# Patient Record
Sex: MALE | Race: WHITE | NOT HISPANIC OR LATINO | Employment: OTHER | URBAN - METROPOLITAN AREA
[De-identification: names, ages, dates, MRNs, and addresses within clinical notes are randomized per-mention and may not be internally consistent; named-entity substitution may affect disease eponyms.]

---

## 2017-01-11 DIAGNOSIS — R82.994 HYPERCALCIURIA: Primary | ICD-10-CM

## 2017-01-11 RX ORDER — HYDROCHLOROTHIAZIDE 50 MG/1
50 TABLET ORAL DAILY
Qty: 90 TABLET | Refills: 3 | Status: SHIPPED | OUTPATIENT
Start: 2017-01-11 | End: 2018-02-05

## 2017-01-11 NOTE — TELEPHONE ENCOUNTER
Last Office Visit with Nephrologist:  5/31/16.  Medication refilled per Nephrology Clinic protocol.     Linda Mai RN

## 2017-02-06 ENCOUNTER — TRANSFERRED RECORDS (OUTPATIENT)
Dept: HEALTH INFORMATION MANAGEMENT | Facility: CLINIC | Age: 57
End: 2017-02-06

## 2017-02-22 ENCOUNTER — DOCUMENTATION ONLY (OUTPATIENT)
Dept: UROLOGY | Facility: CLINIC | Age: 57
End: 2017-02-22

## 2017-03-28 ENCOUNTER — PRE VISIT (OUTPATIENT)
Dept: NEPHROLOGY | Facility: CLINIC | Age: 57
End: 2017-03-28

## 2017-03-28 NOTE — TELEPHONE ENCOUNTER
Patient is coming in to see  to go over [facility], no need for a call very thing is in epic ready for appointment.

## 2017-04-11 ENCOUNTER — MYC MEDICAL ADVICE (OUTPATIENT)
Dept: NEPHROLOGY | Facility: CLINIC | Age: 57
End: 2017-04-11

## 2017-04-25 ENCOUNTER — OFFICE VISIT (OUTPATIENT)
Dept: NEPHROLOGY | Facility: CLINIC | Age: 57
End: 2017-04-25

## 2017-04-25 VITALS
HEIGHT: 70 IN | WEIGHT: 175 LBS | BODY MASS INDEX: 25.05 KG/M2 | SYSTOLIC BLOOD PRESSURE: 118 MMHG | DIASTOLIC BLOOD PRESSURE: 77 MMHG | HEART RATE: 58 BPM

## 2017-04-25 DIAGNOSIS — R82.994 HYPERCALCIURIA: ICD-10-CM

## 2017-04-25 DIAGNOSIS — N20.0 RECURRENT KIDNEY STONES: Primary | ICD-10-CM

## 2017-04-25 DIAGNOSIS — Z51.81 ENCOUNTER FOR THERAPEUTIC DRUG MONITORING: ICD-10-CM

## 2017-04-25 ASSESSMENT — PAIN SCALES - GENERAL: PAINLEVEL: NO PAIN (0)

## 2017-04-25 NOTE — LETTER
4/25/2017       RE: Serjio Shields  [address] [facility] [address]     Dear Colleague,    Thank you for referring your patient, Serjio Shields, to the [facility] UROLOGY AND INST FOR PROSTATE AND UROLOGIC CANCERS at [facility]. Please see a copy of my visit note below.    [facility] Nephrology Comprehensive Stone Clinic  04/25/2017     Serjio Shields MRN:[id number] YOB: 1960  Primary care provider: Sony Kidd  Requesting physician:   Larry Elias MD   PHYSICIANS  [address] [facility] [address]  [place] [address]    ASSESSMENT AND RECOMMENDATIONS:   Serjio Shields is a 56 year old male presenting for nephrolithiasis.  1. Recurrent nephrolithiasis: calcium oxalate, risk is hypercalciuria which is in part dietary  - ultrasound shows no stone recurrence  2. Idiopathic hypercalciuria: worse with worsening urine sodium.  Discussed further decrease in dietary sodium.      RTC 52 weeks with litholink and low dose CT scan for kidney stones only    Anita Hoff MD   [facility]  Department of Medicine  Division of Renal Disease and Hypertension  [phone]       REASON FOR VISIT: nephrolithiasis with hypercalciuria    HISTORY OF PRESENT ILLNESS:  Serjio Shields is a 56 year old man with recurrent kidney stones who has followed in stone clinic for several years with litholinks dating back to 2008.  Two years ago he had stone recurrence diagnosed incidentally on CT that diagnosed PE (7/3/14).  He had pre-emptive stone removal (he is recreational ) on 6/24/15 with URS and stone basketing on 6/24/15 for stones seen on 5/26/15 CT.      Since last visit he has continued on high calcium diet and low sodium diet but with occasional pizza.  He remains on HCTZ without any symptoms.  He drinks calcium fortified OJ in am, almond milk at night with dinner.  He has occasional cheese and crackers.    He is  here with 24 hour urine done on 2/6/17 that shows volume of 1.56 L, calcium 539 with sodium of 249 and protein catabolic rate of 1.   The 24 hour urine oxalate is 36 and super-saturation of calcium oxalate 7.15.   24 hour urine citrate is 1090 with pH of 6.7.  24 hour urine uric acid is 586 mg/day with super-saturation uric acid of 0.09.    In terms of calcium phosphate, risk is elevated with ss CaP of 4 in the setting of high urine calcium.  24 hour urine potassium is 76 mmol/day  24 hour urine magnesium is 152 mg/day     Getting laid off from his job as a , he is considering finding a job as a .    PAST MEDICAL HISTORY:  Past Medical History:   Diagnosis Date     Pulmonary emboli (H)      Pure hypercholesterolemia      Renal disease     kidney stones       PSH:  Past Surgical History:   Procedure Laterality Date     CYSTOSCOPY, URETEROSCOPY, COMBINED Left 6/24/2015    Procedure: COMBINED CYSTOSCOPY, URETEROSCOPY;  Surgeon: Larry Elias MD;  Location: [facility] OR     SURGICAL HISTORY OF -       meatus dilation in childhood        MEDICATIONS:  Current Outpatient Prescriptions   Medication Sig Dispense Refill     hydrochlorothiazide (HYDRODIURIL) 50 MG tablet Take 1 tablet (50 mg) by mouth daily 90 tablet 3     simvastatin (ZOCOR) 40 MG tablet Take 1 tablet (40 mg) by mouth At Bedtime 90 tablet 3     Cyanocobalamin (VITAMIN B 12 PO) Take 1 tablet by mouth daily        Cholecalciferol (VITAMIN D3 PO) Take 1 tablet by mouth daily.          ALLERGIES:    Allergies   Allergen Reactions     Penicillins Unknown     Unsure of exact reaction, had it as an infant       REVIEW OF SYSTEMS:  A 10 point review of systems was negative except as noted above.    SOCIAL HISTORY:   Social History     Social History     Marital status: Single     Spouse name: N/A     Number of children: N/A     Years of education: N/A     Occupational History     Not on file.     Social History Main Topics     Smoking status: Never  "Smoker     Smokeless tobacco: Never Used     Alcohol use Yes      Comment: rare use     Drug use: No     Sexual activity: Yes     Partners: Female     Other Topics Concern     Parent/Sibling W/ Cabg, Mi Or Angioplasty Before 65f 55m? No     Social History Narrative   , recreational     FAMILY MEDICAL HISTORY:   Family History   Problem Relation Age of Onset     C.A.D. Mother      stents     Hypertension Mother      Cancer - colorectal Mother      CANCER Mother      skin cancer     DIABETES No family hx of      CEREBROVASCULAR DISEASE No family hx of      Breast Cancer No family hx of      Cancer - colorectal No family hx of      Hypertension Brother      CANCER Father      bladder, metastatic to bone, passed 2002     Skin Cancer Father      Prostate Cancer Paternal Grandfather        PHYSICAL EXAM:   /77  Pulse 58  Ht 1.778 m (5' 10\")  Wt 79.4 kg (175 lb)  BMI 25.11 kg/m2     GENERAL APPEARANCE: alert and no distress  Head: NC/AT  EYES: no scleral icterus, gaze conjugate  HENT: mouth without ulcers or lesions  NECK: supple, no goiter  RESP: normal work of breathing, no cyanosis or clubbing   CV: no edema, warm and well perfused  : no Lu  SKIN: no rash, warm, dry  NEURO: face symmetric, mentation intact and speech normal  Psych: well groomed, affect full, pleasant, normal mental status  Musculoskeletal: grossly normal ROM of arms and legs without obvious joint abnormalities    LABS:   Electrolytes/Renal -   Recent Labs   Lab Test  12/30/15   0935  10/30/15   1624  06/09/15   0806   NA  139  140  140   POTASSIUM  4.4  4.0  4.3   CHLORIDE  103  106  106   CO2  31  31  27   BUN  13  14  12   CR  0.92  0.96  0.90   GLC  86  92  77   NYASIA  9.0  8.6  8.9   PHOS   --   2.1*   --        CBC -   Recent Labs   Lab Test  06/09/15   0806  11/07/14   0934  07/03/14   1734  07/03/14   1205   WBC   --   5.6  8.2  7.1   HGB  16.0  15.8  14.3  15.1   PLT   --   178  155  167       LFTs -   Recent Labs   Lab " Test  10/30/15   1624  07/03/14   1205  01/03/12   0803  05/16/11   1646   ALKPHOS   --   88   --    --    BILITOTAL   --   0.6   --    --    ALT   --   22  37  22   AST   --   17   --    --    PROTTOTAL   --   7.3   --    --    ALBUMIN  3.4  3.9   --    --        Coags -   Recent Labs   Lab Test  10/07/14   0720 09/09/14 08/19/14   INR  2.10*  2.5*  2.8*       Iron Panel - No lab results found.    Endocrine - No lab results found.    IMAGING:  Ultrasound report from today reviewed by me - no kidney stone    Anita Hoff MD

## 2017-04-25 NOTE — LETTER
April 25, 2017    RE:  Serjio Shields                              [address] [place] [address]            To whom it may concern:    Serjio Shields is under my professional care for    Recurrent kidney stones  Hypercalciuria.     He was recently seen in clinic for routine follow up. He had ultrasound done today. He currently is clear of nephrolithiasis.   There is no kidney stone.     Sincerely,    Anita Hoff MD

## 2017-04-25 NOTE — PROGRESS NOTES
[facility] Nephrology Comprehensive Stone Clinic  04/25/2017     Serjio Shields MRN:[id number] YOB: 1960  Primary care provider: Sony Kidd  Requesting physician:   Larry Elias MD   PHYSICIANS  [address] [facility] [address]  [place] [address]    ASSESSMENT AND RECOMMENDATIONS:   Serjio Shields is a 56 year old male presenting for nephrolithiasis.  1. Recurrent nephrolithiasis: calcium oxalate, risk is hypercalciuria which is in part dietary  - ultrasound shows no stone recurrence  2. Idiopathic hypercalciuria: worse with worsening urine sodium.  Discussed further decrease in dietary sodium.      RTC 52 weeks with litholink and low dose CT scan for kidney stones only    Anita Hoff MD   [facility]  Department of Medicine  Division of Renal Disease and Hypertension  [phone]       REASON FOR VISIT: nephrolithiasis with hypercalciuria    HISTORY OF PRESENT ILLNESS:  Serjio Shields is a 56 year old man with recurrent kidney stones who has followed in stone clinic for several years with litholinks dating back to 2008.  Two years ago he had stone recurrence diagnosed incidentally on CT that diagnosed PE (7/3/14).  He had pre-emptive stone removal (he is recreational ) on 6/24/15 with URS and stone basketing on 6/24/15 for stones seen on 5/26/15 CT.      Since last visit he has continued on high calcium diet and low sodium diet but with occasional pizza.  He remains on HCTZ without any symptoms.  He drinks calcium fortified OJ in am, almond milk at night with dinner.  He has occasional cheese and crackers.    He is here with 24 hour urine done on 2/6/17 that shows volume of 1.56 L, calcium 539 with sodium of 249 and protein catabolic rate of 1.   The 24 hour urine oxalate is 36 and super-saturation of calcium oxalate 7.15.   24 hour urine citrate is 1090 with pH of 6.7.  24 hour urine uric acid is 586 mg/day with super-saturation uric  acid of 0.09.    In terms of calcium phosphate, risk is elevated with ss CaP of 4 in the setting of high urine calcium.  24 hour urine potassium is 76 mmol/day  24 hour urine magnesium is 152 mg/day     Getting laid off from his job as a , he is considering finding a job as a .    PAST MEDICAL HISTORY:  Past Medical History:   Diagnosis Date     Pulmonary emboli (H)      Pure hypercholesterolemia      Renal disease     kidney stones       PSH:  Past Surgical History:   Procedure Laterality Date     CYSTOSCOPY, URETEROSCOPY, COMBINED Left 6/24/2015    Procedure: COMBINED CYSTOSCOPY, URETEROSCOPY;  Surgeon: Larry Elias MD;  [facility]     SURGICAL HISTORY OF -       meatus dilation in childhood        MEDICATIONS:  Current Outpatient Prescriptions   Medication Sig Dispense Refill     hydrochlorothiazide (HYDRODIURIL) 50 MG tablet Take 1 tablet (50 mg) by mouth daily 90 tablet 3     simvastatin (ZOCOR) 40 MG tablet Take 1 tablet (40 mg) by mouth At Bedtime 90 tablet 3     Cyanocobalamin (VITAMIN B 12 PO) Take 1 tablet by mouth daily        Cholecalciferol (VITAMIN D3 PO) Take 1 tablet by mouth daily.          ALLERGIES:    Allergies   Allergen Reactions     Penicillins Unknown     Unsure of exact reaction, had it as an infant       REVIEW OF SYSTEMS:  A 10 point review of systems was negative except as noted above.    SOCIAL HISTORY:   Social History     Social History     Marital status: Single     Spouse name: N/A     Number of children: N/A     Years of education: N/A     Occupational History     Not on file.     Social History Main Topics     Smoking status: Never Smoker     Smokeless tobacco: Never Used     Alcohol use Yes      Comment: rare use     Drug use: No     Sexual activity: Yes     Partners: Female     Other Topics Concern     Parent/Sibling W/ Cabg, Mi Or Angioplasty Before 65f 55m? No     Social History Narrative   , recreational     FAMILY MEDICAL HISTORY:  "  Family History   Problem Relation Age of Onset     C.A.D. Mother      stents     Hypertension Mother      Cancer - colorectal Mother      CANCER Mother      skin cancer     DIABETES No family hx of      CEREBROVASCULAR DISEASE No family hx of      Breast Cancer No family hx of      Cancer - colorectal No family hx of      Hypertension Brother      CANCER Father      bladder, metastatic to bone, passed 2002     Skin Cancer Father      Prostate Cancer Paternal Grandfather        PHYSICAL EXAM:   /77  Pulse 58  Ht 1.778 m (5' 10\")  Wt 79.4 kg (175 lb)  BMI 25.11 kg/m2     GENERAL APPEARANCE: alert and no distress  Head: NC/AT  EYES: no scleral icterus, gaze conjugate  HENT: mouth without ulcers or lesions  NECK: supple, no goiter  RESP: normal work of breathing, no cyanosis or clubbing   CV: no edema, warm and well perfused  : no Lu  SKIN: no rash, warm, dry  NEURO: face symmetric, mentation intact and speech normal  Psych: well groomed, affect full, pleasant, normal mental status  Musculoskeletal: grossly normal ROM of arms and legs without obvious joint abnormalities    LABS:   Electrolytes/Renal -   Recent Labs   Lab Test  12/30/15   0935  10/30/15   1624  06/09/15   0806   NA  139  140  140   POTASSIUM  4.4  4.0  4.3   CHLORIDE  103  106  106   CO2  31  31  27   BUN  13  14  12   CR  0.92  0.96  0.90   GLC  86  92  77   NYASIA  9.0  8.6  8.9   PHOS   --   2.1*   --        CBC -   Recent Labs   Lab Test  06/09/15   0806  11/07/14   0934  07/03/14   1734  07/03/14   1205   WBC   --   5.6  8.2  7.1   HGB  16.0  15.8  14.3  15.1   PLT   --   178  155  167       LFTs -   Recent Labs   Lab Test  10/30/15   1624  07/03/14   1205  01/03/12   0803  05/16/11   1646   ALKPHOS   --   88   --    --    BILITOTAL   --   0.6   --    --    ALT   --   22  37  22   AST   --   17   --    --    PROTTOTAL   --   7.3   --    --    ALBUMIN  3.4  3.9   --    --        Coags -   Recent Labs   Lab Test  10/07/14   0720 09/09/14 " 08/19/14   INR  2.10*  2.5*  2.8*       Iron Panel - No lab results found.    Endocrine - No lab results found.    IMAGING:  Ultrasound report from today reviewed by me - no kidney stone    Anita Hoff MD

## 2017-04-25 NOTE — MR AVS SNAPSHOT
After Visit Summary   4/25/2017    Serjio Shields    MRN: [id number]           Patient Information     Date Of Birth          1960        Visit Information        Provider Department      4/25/2017 2:20 PM Anita Hoff MD [facility] Urology and [facility] for Prostate and Urologic Cancers        Today's Diagnoses     Recurrent kidney stones    -  1    Hypercalciuria          Care Instructions    Dear Serjio Shields      Your were seen in the [facility] Stone [facility].  Basic advice to avoid kidney stones  - Drink 100 ounces of fluid daily  - keep urine volume greater than 72 ounces per day  - low sodium diet (less than 2400 mg sodium per day)  - plenty of dietary calcium.  Please have one high calcium food three times a day with meals - can be either 8 oz milk, 6 oz yogurt or 2 oz low sodium cheese or 8 oz calcium fortified OJ/dairy alternative)   ---- Total should be at least 1000 mg calcium a day from food  - Protein (meat) in moderation  - add lemon or lime to foods and beverages.  Consider 2-4 oz lemon or lime juice diluted in water.  I advise against lemonade since it is high in sugar and low in actual lemon juice.     [email or web address]    Today we discussed continue low salt and high calcium diet.      Please get the following tests done:  24 hour urine in one year  CT scan in one year    Please set up appointment with:  Anita Hoff MD in one year    It was a pleasure meeting with you today. Thank you for allowing me and my team the privilege of caring for you today. YOU are the reason we are here, and I truly hope we provided you with the excellent service you deserve. Please let us know if there is anything else we can do for you so that we can be sure you are leaving completely satisfied with your care experience.    Take care!  Anita Hoff MD  Department of Medicine  Division of Renal Diseases and Hypertension  [facility]  [place]    Email: [email or web address]  You may reach a nurse by calling the urology clinic at [phone]              Follow-ups after your visit        Your next 10 appointments already scheduled     Apr 25, 2017  2:20 PM CDT   (Arrive by 2:05 PM)   Return Visit with Anita Hoff MD   [facility] Urology and [facility] for Prostate and Urologic Cancers ([facility])    [address] [facility] [address] [phone]            May 09, 2017  7:20 AM CDT   Office Visit [facility]idd MD   [facility]    [address] [address] [phone]           Bring a current list of meds and any records pertaining to this visit.  For Physicals, please bring immunization records and any forms needing to be filled out.  Please arrive 10 minutes early to complete paperwork.              Who to contact     Please call your clinic at [phone] to:    Ask questions about your health    Make or cancel appointments    Discuss your medicines    Learn about your test results    Speak to your doctor   If you have compliments or concerns about an experience at your clinic, or if you wish to file a complaint, please contact [facility] Physicians Patient Relations at [phone] or email us at [email or web address]         Additional Information About Your Visit        MyChart Information     [employer] gives you secure access to your electronic health record. If you see a primary care provider, you can also send messages to your care team and make appointments. If you have questions, please call your primary care clinic.  If you do not have a primary care provider, please call [phone] and they will assist you.      W[employer] is an electronic gateway that provides easy, online access to your medical records. With [employer], you can request a clinic appointment, read your test  "results, renew a prescription or communicate with your care team.     To access your existing account, please contact your [facility] or call [phone] for assistance.        Care EveryWhere ID     This is your Care EveryWhere ID. This could be used by other organizations to access your [place] medical records  [id number]        Your Vitals Were     Pulse Height BMI (Body Mass Index)             58 1.778 m (5' 10\") 25.11 kg/m2          Blood Pressure from Last 3 Encounters:   04/25/17 118/77   06/13/16 116/70   05/31/16 136/86    Weight from Last 3 Encounters:   04/25/17 79.4 kg (175 lb)   06/13/16 81.2 kg (179 lb)   05/31/16 82.1 kg (181 lb 1.6 oz)              Today, you had the following     No orders found for display       Primary Care Provider Office Phone # Fax #    Sony Kidd [phone]       [address] [address]        Thank you!     Thank you for choosing [facility] UROLOGY AND [facility] FOR PROSTATE AND UROLOGIC CANCERS  for your care. Our goal is always to provide you with excellent care. Hearing back from our patients is one way we can continue to improve our services. Please take a few minutes to complete the written survey that you may receive in the mail after your visit with us. Thank you!             Your Updated Medication List - Protect others around you: Learn how to safely use, store and throw away your medicines at www.disposemymeds.org.          This list is accurate as of: 4/25/17  1:57 PM.  Always use your most recent med list.                   Brand Name Dispense Instructions for use    hydrochlorothiazide 50 MG tablet    HYDRODIURIL    90 tablet    Take 1 tablet (50 mg) by mouth daily       simvastatin 40 MG tablet    ZOCOR    90 tablet    Take 1 tablet (40 mg) by mouth At Bedtime       VITAMIN B 12 PO      Take 1 tablet by mouth daily       VITAMIN D3 PO      Take 1 tablet by " mouth daily.

## 2017-05-09 ENCOUNTER — OFFICE VISIT (OUTPATIENT)
Dept: FAMILY MEDICINE | Facility: CLINIC | Age: 57
End: 2017-05-09
Payer: COMMERCIAL

## 2017-05-09 VITALS
DIASTOLIC BLOOD PRESSURE: 78 MMHG | SYSTOLIC BLOOD PRESSURE: 124 MMHG | HEART RATE: 56 BPM | WEIGHT: 175 LBS | HEIGHT: 70 IN | TEMPERATURE: 98.4 F | BODY MASS INDEX: 25.05 KG/M2

## 2017-05-09 DIAGNOSIS — E78.5 HYPERLIPIDEMIA LDL GOAL <130: Primary | ICD-10-CM

## 2017-05-09 DIAGNOSIS — Z80.42 FAMILY HISTORY OF PROSTATE CANCER: ICD-10-CM

## 2017-05-09 DIAGNOSIS — R82.994 HYPERCALCIURIA: ICD-10-CM

## 2017-05-09 DIAGNOSIS — Z12.5 SPECIAL SCREENING FOR MALIGNANT NEOPLASM OF PROSTATE: ICD-10-CM

## 2017-05-09 DIAGNOSIS — Z86.711 HISTORY OF PULMONARY EMBOLISM: ICD-10-CM

## 2017-05-09 LAB
ANION GAP SERPL CALCULATED.3IONS-SCNC: 9 MMOL/L (ref 3–14)
BUN SERPL-MCNC: 9 MG/DL (ref 7–30)
CALCIUM SERPL-MCNC: 8.8 MG/DL (ref 8.5–10.1)
CHLORIDE SERPL-SCNC: 107 MMOL/L (ref 94–109)
CHOLEST SERPL-MCNC: 170 MG/DL
CO2 SERPL-SCNC: 26 MMOL/L (ref 20–32)
CREAT SERPL-MCNC: 0.93 MG/DL (ref 0.66–1.25)
GFR SERPL CREATININE-BSD FRML MDRD: 83 ML/MIN/1.7M2
GLUCOSE SERPL-MCNC: 90 MG/DL (ref 70–99)
HDLC SERPL-MCNC: 47 MG/DL
LDLC SERPL CALC-MCNC: 92 MG/DL
NONHDLC SERPL-MCNC: 123 MG/DL
POTASSIUM SERPL-SCNC: 3.9 MMOL/L (ref 3.4–5.3)
PSA SERPL-ACNC: 1.05 UG/L (ref 0–4)
SODIUM SERPL-SCNC: 142 MMOL/L (ref 133–144)
TRIGL SERPL-MCNC: 154 MG/DL

## 2017-05-09 PROCEDURE — 80048 BASIC METABOLIC PNL TOTAL CA: CPT | Performed by: FAMILY MEDICINE

## 2017-05-09 PROCEDURE — 36415 COLL VENOUS BLD VENIPUNCTURE: CPT | Performed by: FAMILY MEDICINE

## 2017-05-09 PROCEDURE — G0103 PSA SCREENING: HCPCS | Performed by: FAMILY MEDICINE

## 2017-05-09 PROCEDURE — 99213 OFFICE O/P EST LOW 20 MIN: CPT | Performed by: FAMILY MEDICINE

## 2017-05-09 PROCEDURE — 80061 LIPID PANEL: CPT | Performed by: FAMILY MEDICINE

## 2017-05-09 RX ORDER — HYDROCHLOROTHIAZIDE 50 MG/1
50 TABLET ORAL DAILY
Qty: 90 TABLET | Refills: 3 | Status: CANCELLED | OUTPATIENT
Start: 2017-05-09

## 2017-05-09 RX ORDER — SIMVASTATIN 40 MG
40 TABLET ORAL AT BEDTIME
Qty: 90 TABLET | Refills: 3 | Status: CANCELLED | OUTPATIENT
Start: 2017-05-09

## 2017-05-09 NOTE — MR AVS SNAPSHOT
After Visit Summary   5/9/2017    Serjio Shields    MRN: [id number]           Patient Information     Date Of Birth          1960        Visit Information        Provider Department      5/9/2017 7:20 AM Sony Kidd MD [facility]        Today's Diagnoses     Hyperlipidemia LDL goal <130    -  1    Family history of prostate cancer        Special screening for malignant neoplasm of prostate        Hypercalciuria        History of pulmonary embolism           Follow-ups after your visit        Who to contact     If you have questions or need follow up information about today's clinic visit or your schedule please contact [facility] directly at [phone].  Normal or non-critical lab and imaging results will be communicated to you by MyChart, letter or phone within 4 business days after the clinic has received the results. If you do not hear from us within 7 days, please contact the clinic through PDV[employer] or phone. If you have a critical or abnormal lab result, we will notify you by phone as soon as possible.  Submit refill requests through [employer] or call your pharmacy and they will forward the refill request to us. Please allow 3 business days for your refill to be completed.          Additional Information About Your Visit        MyChart Information     [employer] gives you secure access to your electronic health record. If you see a primary care provider, you can also send messages to your care team and make appointments. If you have questions, please call your primary care clinic.  If you do not have a primary care provider, please call [phone] and they will assist you.        Care EveryWhere ID     This is your Care EveryWhere ID. This could be used by other organizations to access your [place] medical records  [id number]        Your Vitals Were     Pulse Temperature Height BMI (Body Mass Index)          56 98.4  F (36.9  C) (Oral) 5'  "10\" (1.778 m) 25.11 kg/m2         Blood Pressure from Last 3 Encounters:   05/09/17 124/78   04/25/17 118/77   06/13/16 116/70    Weight from Last 3 Encounters:   05/09/17 175 lb (79.4 kg)   04/25/17 175 lb (79.4 kg)   06/13/16 179 lb (81.2 kg)              We Performed the Following     Basic metabolic panel     Lipid Profile with reflex to direct LDL     PSA, screen        Primary Care Provider Office Phone # Fax #    Sony Kidd [phone]       [facility] [address] [address]        Thank you!     Thank you for choosing [facility]  for your care. Our goal is always to provide you with excellent care. Hearing back from our patients is one way we can continue to improve our services. Please take a few minutes to complete the written survey that you may receive in the mail after your visit with us. Thank you!             Your Updated Medication List - Protect others around you: Learn how to safely use, store and throw away your medicines at www.disposemymeds.org.          This list is accurate as of: 5/9/17  7:51 AM.  Always use your most recent med list.                   Brand Name Dispense Instructions for use    hydrochlorothiazide 50 MG tablet    HYDRODIURIL    90 tablet    Take 1 tablet (50 mg) by mouth daily       simvastatin 40 MG tablet    ZOCOR    90 tablet    Take 1 tablet (40 mg) by mouth At Bedtime       VITAMIN B 12 PO      Take 1 tablet by mouth daily       VITAMIN D3 PO      Take 1 tablet by mouth daily.         "

## 2017-05-09 NOTE — PROGRESS NOTES
SUBJECTIVE:                                                    Serjio Shields is a 56 year old male who presents to clinic today for the following health issues:    Prostate cancer. Screening recommendations discussed. Previous PSA's WNL, however he does have a family history of prostate cancer (distaff grandfather in his 60's). Denies nocturia, urinary hesitancy, or urinary retention.     Hx of PE in 3/14. He completed treatment at that time and has been doing well. Denies SOB or leg swelling. He's a  and need letter to confirm ability to fly.     Past/recent records reviewed and discussed for -- medications.      Hyperlipidemia Follow-Up      Rate your low fat/cholesterol diet?: good    Taking statin?  Yes, no muscle aches from statin    Other lipid medications/supplements?:  none     Hypertension Follow-up      Outpatient blood pressures are not being checked.    Low Salt Diet: no added salt       Amount of exercise or physical activity: 4-5 days/week for an average of 30-45 minutes    Problems taking medications regularly: No    Medication side effects: none    Diet: low salt and low fat/cholesterol          Problem list and histories reviewed & adjusted, as indicated.  Additional history: as documented    Labs reviewed in EPIC    Reviewed and updated as needed this visit by clinical staff  Tobacco  Allergies  Med Hx  Surg Hx  Fam Hx  Soc Hx      Reviewed and updated as needed this visit by Provider         ROS:  Constitutional, HEENT, cardiovascular, pulmonary, GI, , musculoskeletal, neuro, skin, endocrine and psych systems are negative, except as otherwise noted.    This document serves as a record of the services and decisions personally performed and made by Enoc Kidd MD. It was created on their behalf by Haris Encinas, a trained medical scribe. The creation of this document is based the provider's statements to the medical scribe.  Haris Encinas May 9, 2017 7:34 AM         OBJECTIVE:           "                                          /78 (Cuff Size: Adult Regular)  Pulse 56  Temp 98.4  F (36.9  C) (Oral)  Ht 1.778 m (5' 10\")  Wt 79.4 kg (175 lb)  BMI 25.11 kg/m2  Body mass index is 25.11 kg/(m^2).  GENERAL: healthy, alert and no distress  HENT: ear canals and TM's normal, nose and mouth without ulcers or lesions  NECK: no adenopathy, no asymmetry, masses, or scars and thyroid normal to palpation  RESP: lungs clear to auscultation - no rales, rhonchi or wheezes  CV: regular rate and rhythm, normal S1 S2, no S3 or S4, no murmur, click or rub, no peripheral edema and peripheral pulses strong  ABDOMEN: soft, nontender, no hepatosplenomegaly, no masses and bowel sounds normal  MS: no gross musculoskeletal defects noted, no edema, no calf tenderness   SKIN: no suspicious lesions or rashes  PSYCH: mentation appears normal, affect normal/bright    Diagnostic Test Results:  none      ASSESSMENT/PLAN:                                                    (E78.5) Hyperlipidemia LDL goal <130  (primary encounter diagnosis)  Comment: stable from previous workups  Plan: Lipid Profile with reflex to direct LDL        -follow up, pending results    (Z80.42) Family history of prostate cancer  Comment: patient is asymptomatic and previous PSA's WNL  Plan: PSA, screen        -Follow up, pending results    (Z12.5) Special screening for malignant neoplasm of prostate  Comment: see comments above  Plan: PSA, screen        -Follow up, pending results    (E83.50) Hypercalciuria  Comment: stable  Plan: Basic metabolic panel        -follow up, pending results    (Z86.711) History of pulmonary embolism  Comment: Patient doing well. Needs documentation confirming benign status.  Plan: Letter written        The information in this document, created by the medical scribe for me, accurately reflects the services I personally performed and the decisions made by me. I have reviewed and approved this document for accuracy prior " to leaving the patient care area.      Sony Kidd MD, MD  [facility]

## 2017-05-09 NOTE — NURSING NOTE
"Chief Complaint   Patient presents with     Hypertension     Lipids     pt is fasting       Initial /78 (Cuff Size: Adult Regular)  Pulse 56  Temp 98.4  F (36.9  C) (Oral)  Ht 5' 10\" (1.778 m)  Wt 175 lb (79.4 kg)  BMI 25.11 kg/m2 Estimated body mass index is 25.11 kg/(m^2) as calculated from the following:    Height as of this encounter: 5' 10\" (1.778 m).    Weight as of this encounter: 175 lb (79.4 kg).  Medication Reconciliation: complete   Anita Roberts, Certified Medical Assistant (AAMA)     "

## 2017-07-25 ENCOUNTER — OFFICE VISIT (OUTPATIENT)
Dept: DERMATOLOGY | Facility: CLINIC | Age: 57
End: 2017-07-25

## 2017-07-25 DIAGNOSIS — D48.5 NEOPLASM OF UNCERTAIN BEHAVIOR OF SKIN: Primary | ICD-10-CM

## 2017-07-25 DIAGNOSIS — D18.00 HEMANGIOMA: ICD-10-CM

## 2017-07-25 DIAGNOSIS — D18.01 CHERRY ANGIOMA: ICD-10-CM

## 2017-07-25 DIAGNOSIS — L82.1 SEBORRHEIC KERATOSIS: ICD-10-CM

## 2017-07-25 ASSESSMENT — PAIN SCALES - GENERAL
PAINLEVEL: NO PAIN (0)
PAINLEVEL: NO PAIN (0)

## 2017-07-25 NOTE — MR AVS SNAPSHOT
After Visit Summary   7/25/2017    Serjio Shields    MRN: [id number]           Patient Information     Date Of Birth          1960        Visit Information        Provider Department      7/25/2017 3:45 PM Kailey Martinez MD [facility] Dermatology        Today's Diagnoses     Neoplasm of uncertain behavior of skin    -  1      Care Instructions    Wound Care After a Biopsy    What is a skin biopsy?  A skin biopsy allows the doctor to examine a very small piece of tissue under the microscope to determine the diagnosis and the best treatment for the skin condition. A local anesthetic (numbing medicine)  is injected with a very small needle into the skin area to be tested. A small piece of skin is taken from the area. Sometimes a suture (stitch) is used.     What are the risks of a skin biopsy?  I will experience scar, bleeding, swelling, pain, crusting and redness. I may experience incomplete removal or recurrence. Risks of this procedure are excessive bleeding, bruising, infection, nerve damage, numbness, thick (hypertrophic or keloidal) scar and non-diagnostic biopsy.    How should I care for my wound for the first 24 hours?    Keep the wound dry and covered for 24 hours    If it bleeds, hold direct pressure on the area for 15 minutes. If bleeding does not stop then go to the emergency room    Avoid strenuous exercise the first 1-2 days or as your doctor instructs you    How should I care for the wound after 24 hours?    After 24 hours, remove the bandage    You may bathe or shower as normal    If you had a scalp biopsy, you can shampoo as usual and can use shower water to clean the biopsy site daily    Clean the wound twice a day with gentle soap and water    Do not scrub, be gentle    Apply white petroleum/Vaseline after cleaning the wound with a cotton swab or a clean finger, and keep the site covered with a Bandaid /bandage. Bandages are not necessary with a scalp biopsy    If  you are unable to cover the site with a Bandaid /bandage, re-apply ointment 2-3 times a day to keep the site moist. Moisture will help with healing    Avoid strenuous activity for first 1-2 days    Avoid lakes, rivers, pools, and oceans until the stitches are removed or the site is healed    How do I clean my wound?    Wash hands thoroughly with soap or use hand  before all wound care    Clean the wound with gentle soap and water    Apply white petroleum/Vaseline  to wound after it is clean    Replace the Bandaid /bandage to keep the wound covered for the first few days or as instructed by your doctor    If you had a scalp biopsy, warm shower water to the area on a daily basis should suffice    What should I use to clean my wound?     Cotton-tipped applicators (Qtips )    White petroleum jelly (Vaseline ). Use a clean new container and use Q-tips to apply.    Bandaids   as needed    Gentle soap     How should I care for my wound long term?    Do not get your wound dirty    Keep up with wound care for one week or until the area is healed.    A small scab will form and fall off by itself when the area is completely healed. The area will be red and will become pink in color as it heals. Sun protection is very important for how your scar will turn out. Sunscreen with an SPF 30 or greater is recommended once the area is healed.    You should have some soreness but it should be mild and slowly go away over several days. Talk to your doctor about using tylenol for pain,    When should I call my doctor?  If you have increased:     Pain or swelling    Pus or drainage (clear or slightly yellow drainage is ok)    Temperature over 100F    Spreading redness or warmth around wound    When will I hear about my results?  The biopsy results can take 2-3 weeks to come back. The clinic will call you with the results, send you a [employer] message, or have you schedule a follow-up clinic or phone time to discuss the results.  Contact our clinics if you do not hear from us in 3 weeks.     Who should I call with questions?    [facility]: [phone]     [facility]: [phone]    For urgent needs outside of business hours call the [facility] at [phone] and ask for the dermatology resident on call              Follow-ups after your visit        Follow-up notes from your care team     Return in about 1 year (around 7/25/2018).      Who to contact     Please call your clinic at [phone] to:    Ask questions about your health    Make or cancel appointments    Discuss your medicines    Learn about your test results    Speak to your doctor   If you have compliments or concerns about an experience at your clinic, or if you wish to file a complaint, please contact [facility] Patient Relations at [phone] or email us at [email or web address]         Additional Information About Your Visit        [employer] Information     [employer] gives you secure access to your electronic health record. If you see a primary care provider, you can also send messages to your care team and make appointments. If you have questions, please call your primary care clinic.  If you do not have a primary care provider, please call [phone] and they will assist you.      [employer] is an electronic gateway that provides easy, online access to your medical records. With [employer], you can request a clinic appointment, read your test results, renew a prescription or communicate with your care team.     To access your existing account, please contact your [facility] or call [phone] for assistance.        Care EveryWhere ID     This is your Care EveryWhere ID. This could be used by other organizations to access your [place] medical records  [id number]         Blood Pressure from Last 3 Encounters:   05/09/17 124/78    04/25/17 118/77   06/13/16 116/70    Weight from Last 3 Encounters:   05/09/17 79.4 kg (175 lb)   04/25/17 79.4 kg (175 lb)   06/13/16 81.2 kg (179 lb)              We Performed the Following     BIOPSY SKIN/SUBQ/MUC MEM, SINGLE LESION     Surgical pathology exam        Primary Care Provider Office Phone # Fax #    Sony Kidd [phone]       [facility] [address] [place] [id number]        Equal Access to Services     [facility]: Hadii aad ku hadasho Soomaali, waaxda luqadaha, qaybta kaalmada adeegyarebecca, groevr marx . So [facility] [phone].    ATENCIÓN: Si habla español, tiene a junior disposición servicios gratuitos de asistencia lingüística. [facility] [phone].    We comply with applicable federal civil rights laws and [place] laws. We do not discriminate on the basis of race, color, national origin, age, disability sex, sexual orientation or gender identity.            Thank you!     Thank you for choosing [facility]ERMAT[facility]  for your care. Our goal is always to provide you with excellent care. Hearing back from our patients is one way we can continue to improve our services. Please take a few minutes to complete the written survey that you may receive in the mail after your visit with us. Thank you!             Your Updated Medication List - Protect others around you: Learn how to safely use, store and throw away your medicines at www.disposemymeds.org.          This list is accurate as of: 7/25/17  4:10 PM.  Always use your most recent med list.                   Brand Name Dispense Instructions for use Diagnosis    hydrochlorothiazide 50 MG tablet    HYDRODIURIL    90 tablet    Take 1 tablet (50 mg) by mouth daily    Hypercalciuria       simvastatin 40 MG tablet    ZOCOR    90 tablet    Take 1 tablet (40 mg) by mouth At Bedtime    Hyperlipidemia LDL goal <160       VITAMIN B 12 PO      Take 1 tablet by mouth daily         VITAMIN D3 PO      Take 1 tablet by mouth daily.

## 2017-07-25 NOTE — NURSING NOTE
Dermatology Rooming Note    Serjio Shields's goals for this visit include:   Chief Complaint   Patient presents with     Derm Problem     Chema is here today for a skin check.  States he has an area of concern on his right calf.       Colleen Oscar LPN

## 2017-07-25 NOTE — PATIENT INSTRUCTIONS

## 2017-07-25 NOTE — PROGRESS NOTES
[facility] Dermatology Note      Dermatology Problem List:  1.Actinic keratosis  2.Poikiloderma  3. Neoplasm uncertain behavior R Calf, biopsied today    Encounter Date: Jul 25, 2017    CC:   Chief Complaint   Patient presents with     Derm Problem     Chema is here today for a skin check.  States he has an area of concern on his right calf.         History of Present Illness:  Mr. Serjio Shields is a 56 year old male who presents today for a skin check of sun exposed areas. Patient does not have a history of skin cancer, but reports a family history of skin cancer and unsure if this was melanoma. He is concerned about a sore on his right calf that has not healed over the past year. He says that it itches from time to time and he scratches it. He uses sunscreen daily as well as protective hats.     He otherwise denies any new or changing moles and denies any other non-healing sores. He denies fatigue, fever, chills, unexpected weight loss or recent illness.    Past Medical History:   Patient Active Problem List   Diagnosis     Kidney stone     Colon polyp     Family history of colon cancer     Family history of prostate cancer     Advanced directives, counseling/discussion     Hyperlipidemia LDL goal <130     History of pulmonary embolism     Seborrheic keratosis     Freckled skin     Past Medical History:   Diagnosis Date     Family history of colon cancer      Pulmonary emboli (H)      Pure hypercholesterolemia      Renal disease     kidney stones     Past Surgical History:   Procedure Laterality Date     COLONOSCOPY  1/16     CYSTOSCOPY, URETEROSCOPY, COMBINED Left 6/24/2015    Procedure: COMBINED CYSTOSCOPY, URETEROSCOPY;  Surgeon: Larry Elias MD;  Location: [facility] OR     SURGICAL HISTORY OF -       meatus dilation in childhood       Social History:  The patient is a  as well as works as a .    Family History:  There is a family history of non-melanoma skin cancer  in the patient's parents.    Medications:  Current Outpatient Prescriptions   Medication Sig Dispense Refill     hydrochlorothiazide (HYDRODIURIL) 50 MG tablet Take 1 tablet (50 mg) by mouth daily 90 tablet 3     simvastatin (ZOCOR) 40 MG tablet Take 1 tablet (40 mg) by mouth At Bedtime 90 tablet 3     Cyanocobalamin (VITAMIN B 12 PO) Take 1 tablet by mouth daily        Cholecalciferol (VITAMIN D3 PO) Take 1 tablet by mouth daily.          Allergies   Allergen Reactions     Penicillins Unknown     Unsure of exact reaction, had it as an infant         Review of Systems:  -As per HPI  -Constitutional: The patient denies fatigue, fevers, chills, unintended weight loss, and night sweats.  -HEENT: Patient denies nonhealing oral sores.  -Skin: As above in HPI. No additional skin concerns.    Physical exam:  Vitals: There were no vitals taken for this visit.  GEN: This is a well developed, well-nourished male in no acute distress, in a pleasant mood.    SKIN: Total skin excluding the undergarment areas was performed. The exam included the head/face, neck, both arms, chest, back, buttocks, abdomen, both legs, digits and/or nails.   -Scattered brown macules on sun exposed areas.  -Multiple skin to tan colored waxy stuck-on appearing papules on neck, trunk, upper and lower extremities.  -There are bright red some shaped papules scattered on the trunk and lower extremities.   -There is a dark purple papule on the abdomen  -Multiple regular brown pigmented macules and papules are identified on the trunk, upper and lower extremities w/o concerning findings on dermoscopy.  -There is an excoriated erythematous papule with peripheral scale on right calf.   -No other lesions of concern on areas examined.     Impression/Plan:  1. Solar lentigines    Sun precaution was advised including the use of sun screens of SPF 30 or higher, sun protective clothing, and avoidance of tanning beds.    ABCD's of melanoma were reviewed with patient  and handout provided.     2. Cherry angiomas:    The patient was informed that the lesion(s) examined is(are) without clinically concerning features at this time.  Discussed that definitive diagnosis is made by histologic exam.  Patient encouraged to return to clinic if lesion(s) change in appearance or become symptomatic.    3. Hemangioma: located on abdomen    Discussed benign nature as above    4. Multiple clinically benign nevi on the trunk, upper and lower extremities    Sun precautions and ABCD's of melanoma as above.    5. Seborrheic keratoses:     Discussed benign nature as above    6. Neoplasm of uncertain behavior on skin: located on right calf    Shave biopsy:  After discussion of benefits and risks including but not limited to bleeding/bruising, pain/swelling, infection, scar, incomplete removal, nerve damage/numbness, recurrence, and non-diagnostic biopsy, written consent, verbal consent and photographs were obtained. Time-out was performed. The area was cleaned with isopropyl alcohol. 1.5 mL of 1% lidocaine with epinephrine was injected to obtain adequate anesthesia of the lesion on the right calf. A shave biopsy was performed. Hemostasis was achieved with aluminium chloride. Vaseline and a sterile dressing were applied. The patient tolerated the procedure and no complications were noted. The patient was provided with verbal and written post care instructions.       CC Dr. Kidd on close of this encounter.  Follow-up in 1 year, earlier for new or changing lesions.       Dr. Martinez staffed the patient.    Staff Involved:  Resident(David Balderas)/Staff(as above)      The Patient was seen in Dermatology Clinic by KAILEY MARTINEZ.  I performed the history, examination, and procedures noted above with the resident.  I have reviewed the history & exam as outlined in this note and made edits where appropriate. The assessment and plan were jointly made.    Kailey Martinez MD,  PhD    Department of Dermatology

## 2017-07-25 NOTE — NURSING NOTE
Lidocaine1-1:129703 % injection   1.5mL once for one use, starting 7/25/2017 ending 7/25/2017,  2mL disp, R-0, injection  Injected by Dr. Martinez

## 2017-07-25 NOTE — LETTER
7/25/2017       RE: Serjio Shields  [address] [facility] [address]     Dear Colleague,    Thank you for referring your patient, Serjio Shields, to the [facility] DERMATOLOGY at [facility]. Please see a copy of my visit note below.    [facility] Dermatology Note      Dermatology Problem List:  1.Actinic keratosis  2.Poikiloderma  3. Neoplasm uncertain behavior R Calf, biopsied today    Encounter Date: Jul 25, 2017    CC:   Chief Complaint   Patient presents with     Derm Problem     Chema is here today for a skin check.  States he has an area of concern on his right calf.         History of Present Illness:  Mr. Serjio Shields is a 56 year old male who presents today for a skin check of sun exposed areas. Patient does not have a history of skin cancer, but reports a family history of skin cancer and unsure if this was melanoma. He is concerned about a sore on his right calf that has not healed over the past year. He says that it itches from time to time and he scratches it. He uses sunscreen daily as well as protective hats.     He otherwise denies any new or changing moles and denies any other non-healing sores. He denies fatigue, fever, chills, unexpected weight loss or recent illness.    Past Medical History:   Patient Active Problem List   Diagnosis     Kidney stone     Colon polyp     Family history of colon cancer     Family history of prostate cancer     Advanced directives, counseling/discussion     Hyperlipidemia LDL goal <130     History of pulmonary embolism     Seborrheic keratosis     Freckled skin     Past Medical History:   Diagnosis Date     Family history of colon cancer      Pulmonary emboli (H)      Pure hypercholesterolemia      Renal disease     kidney stones     Past Surgical History:   Procedure Laterality Date     COLONOSCOPY  1/16     CYSTOSCOPY, URETEROSCOPY, COMBINED Left 6/24/2015    Procedure: COMBINED CYSTOSCOPY,  URETEROSCOPY;  Surgeon: Larry Elias MD;  [facility]: [facility]     SURGICAL HISTORY OF -       meatus dilation in childhood       Social History:  The patient is a  as well as works as a .    Family History:  There is a family history of non-melanoma skin cancer in the patient's parents.    Medications:  Current Outpatient Prescriptions   Medication Sig Dispense Refill     hydrochlorothiazide (HYDRODIURIL) 50 MG tablet Take 1 tablet (50 mg) by mouth daily 90 tablet 3     simvastatin (ZOCOR) 40 MG tablet Take 1 tablet (40 mg) by mouth At Bedtime 90 tablet 3     Cyanocobalamin (VITAMIN B 12 PO) Take 1 tablet by mouth daily        Cholecalciferol (VITAMIN D3 PO) Take 1 tablet by mouth daily.          Allergies   Allergen Reactions     Penicillins Unknown     Unsure of exact reaction, had it as an infant         Review of Systems:  -As per HPI  -Constitutional: The patient denies fatigue, fevers, chills, unintended weight loss, and night sweats.  -HEENT: Patient denies nonhealing oral sores.  -Skin: As above in HPI. No additional skin concerns.    Physical exam:  Vitals: There were no vitals taken for this visit.  GEN: This is a well developed, well-nourished male in no acute distress, in a pleasant mood.    SKIN: Total skin excluding the undergarment areas was performed. The exam included the head/face, neck, both arms, chest, back, buttocks, abdomen, both legs, digits and/or nails.   -Scattered brown macules on sun exposed areas.  -Multiple skin to tan colored waxy stuck-on appearing papules on neck, trunk, upper and lower extremities.  -There are bright red some shaped papules scattered on the trunk and lower extremities.   -There is a dark purple papule on the abdomen  -Multiple regular brown pigmented macules and papules are identified on the trunk, upper and lower extremities w/o concerning findings on dermoscopy.  -There is an excoriated erythematous papule with peripheral scale on right  calf.   -No other lesions of concern on areas examined.     Impression/Plan:  1. Solar lentigines    Sun precaution was advised including the use of sun screens of SPF 30 or higher, sun protective clothing, and avoidance of tanning beds.    ABCD's of melanoma were reviewed with patient and handout provided.     2. Cherry angiomas:    The patient was informed that the lesion(s) examined is(are) without clinically concerning features at this time.  Discussed that definitive diagnosis is made by histologic exam.  Patient encouraged to return to clinic if lesion(s) change in appearance or become symptomatic.    3. Hemangioma: located on abdomen    Discussed benign nature as above    4. Multiple clinically benign nevi on the trunk, upper and lower extremities    Sun precautions and ABCD's of melanoma as above.    5. Seborrheic keratoses:     Discussed benign nature as above    6. Neoplasm of uncertain behavior on skin: located on right calf    Shave biopsy:  After discussion of benefits and risks including but not limited to bleeding/bruising, pain/swelling, infection, scar, incomplete removal, nerve damage/numbness, recurrence, and non-diagnostic biopsy, written consent, verbal consent and photographs were obtained. Time-out was performed. The area was cleaned with isopropyl alcohol. 1.5 mL of 1% lidocaine with epinephrine was injected to obtain adequate anesthesia of the lesion on the right calf. A shave biopsy was performed. Hemostasis was achieved with aluminium chloride. Vaseline and a sterile dressing were applied. The patient tolerated the procedure and no complications were noted. The patient was provided with verbal and written post care instructions.       CC Dr. Kidd on close of this encounter.  Follow-up in 1 year, earlier for new or changing lesions.       Dr. Martinez staffed the patient.    Staff Involved:  Resident(David Balderas)/Staff(as above)      The Patient was seen in Dermatology Clinic by  KAILEY MARTINEZ.  I performed the history, examination, and procedures noted above with the resident.  I have reviewed the history & exam as outlined in this note and made edits where appropriate. The assessment and plan were jointly made.    Kailey Martinez MD, PhD    Department of Dermatology          Pictures were placed in Pt's chart today for future reference.

## 2017-07-31 LAB — COPATH REPORT: NORMAL

## 2017-09-22 DIAGNOSIS — E78.5 HYPERLIPIDEMIA LDL GOAL <160: ICD-10-CM

## 2017-09-22 NOTE — TELEPHONE ENCOUNTER
simvastatin (ZOCOR) 40 MG tablet   40 mg, AT BEDTIME 3 ordered  Edit     Summary: Take 1 tablet (40 mg) by mouth At Bedtime, Disp-90 tablet, R-3, E-Prescribe   Dose, Route, Frequency: 40 mg, Oral, AT BEDTIME  Start: 9/14/2016  Ord/Sold: 9/14/2016 (O)  Report  Taking:   Long-term:   Pharmacy: [facility]/pharmacy #[id number] - [place], [place] - [address]  [facility] Dose History       Patient Sig: Take 1 tablet (40 mg) by mouth At Bedtime       Ordered on: 9/14/2016       Authorized by: JEREMIE CARDENAS       Dispense: 90 tablet          [place] Office [place] [facility] prescribing provider: 5-9-2017       Lab Results   Component Value Date    CHOL 170 05/09/2017     Lab Results   Component Value Date    HDL 47 05/09/2017     Lab Results   Component Value Date    LDL 92 05/09/2017     Lab Results   Component Value Date    TRIG 154 05/09/2017     Lab Results   Component Value Date    CHOLHDLRATIO 3.0 09/04/2015

## 2017-09-25 RX ORDER — SIMVASTATIN 40 MG
TABLET ORAL
Qty: 90 TABLET | Refills: 3 | Status: SHIPPED | OUTPATIENT
Start: 2017-09-25 | End: 2018-10-17

## 2017-09-25 NOTE — TELEPHONE ENCOUNTER
Prescription approved per [facility] [place].  Connie Rosas,Clinic Rn  [place]

## 2017-10-17 ENCOUNTER — OFFICE VISIT (OUTPATIENT)
Dept: FAMILY MEDICINE | Facility: CLINIC | Age: 57
End: 2017-10-17
Payer: COMMERCIAL

## 2017-10-17 VITALS
TEMPERATURE: 98.4 F | HEIGHT: 70 IN | DIASTOLIC BLOOD PRESSURE: 82 MMHG | BODY MASS INDEX: 25.05 KG/M2 | WEIGHT: 175 LBS | SYSTOLIC BLOOD PRESSURE: 114 MMHG | HEART RATE: 88 BPM

## 2017-10-17 DIAGNOSIS — Z00.8 ENCOUNTER FOR BIOMETRIC SCREENING: Primary | ICD-10-CM

## 2017-10-17 PROCEDURE — 99401 PREV MED CNSL INDIV APPRX 15: CPT | Performed by: PHYSICIAN ASSISTANT

## 2017-10-17 NOTE — NURSING NOTE
"Chief Complaint   Patient presents with     Forms     biometric form       Initial /82 (Cuff Size: Adult Regular)  Pulse 88  Temp 98.4  F (36.9  C) (Oral)  Ht 5' 10\" (1.778 m)  Wt 175 lb (79.4 kg)  BMI 25.11 kg/m2 Estimated body mass index is 25.11 kg/(m^2) as calculated from the following:    Height as of this encounter: 5' 10\" (1.778 m).    Weight as of this encounter: 175 lb (79.4 kg).  Medication Reconciliation: complete   Anita Roberts, Certified Medical Assistant (AAMA)     "

## 2017-10-17 NOTE — MR AVS SNAPSHOT
After Visit Summary   10/17/2017    Serjio Shields    MRN: [id number]           Patient Information     Date Of Birth          1960        Visit Information        Provider Department      10/17/2017 12:20 PM Ozzy Serna PA-C [facility]         Follow-ups after your visit        Your next 10 appointments already scheduled     Oct 18, 2017  9:30 AM CDT   LAB with [facility] LAB   [facility] ([facility]    [address] [place] [id number] [phone]           Patient must bring picture ID. Patient should be prepared to give a urine specimen  Please do not eat 10-12 hours before your appointment if you are coming in fasting for labs on lipids, cholesterol, or glucose (sugar). Pregnant women should follow their Care Team instructions. Water with medications is okay. Do not drink coffee or other fluids. If you have concerns about taking  your medications, please ask at office or if scheduling via Keepy, send a message by clicking on Secure Messaging, Message Your Care Team.              Who to contact     If you have questions or need follow up information about today's clinic visit or your schedule please contact [facility] directly at [phone].  Normal or non-critical lab and imaging results will be communicated to you by MyChart, letter or phone within 4 business days after the clinic has received the results. If you do not hear from us within 7 days, please contact the clinic through [employer] or phone. If you have a critical or abnormal lab result, we will notify you by phone as soon as possible.  Submit refill requests through Keepy or call your pharmacy and they will forward the refill request to us. Please allow 3 business days for your refill to be completed.          Additional Information About Your Visit        [employer] Information     Keepy gives you secure access to your electronic  "health record. If you see a primary care provider, you can also send messages to your care team and make appointments. If you have questions, please call your primary care clinic.  If you do not have a primary care provider, please call [phone] and they will assist you.        Care EveryWhere ID     This is your Care EveryWhere ID. This could be used by other organizations to access your [place] medical records  [id number]        Your Vitals Were     Pulse Temperature Height BMI (Body Mass Index)          88 98.4  F (36.9  C) (Oral) 5' 10\" (1.778 m) 25.11 kg/m2         Blood Pressure from Last 3 Encounters:   10/17/17 114/82   05/09/17 124/78   04/25/17 118/77    Weight from Last 3 Encounters:   10/17/17 175 lb (79.4 kg)   05/09/17 175 lb (79.4 kg)   04/25/17 175 lb (79.4 kg)              Today, you had the following     No orders found for display       Primary Care Provider Office Phone # Fax #    Sony Kidd -[phone]       [address] [facility] [address]        Equal Access to Services     HARDIK ROSENTHAL AH: Hadii alice pucketto Sojony, waaxda luqadaha, qaybta kaalmada adeegyada, grover cloud. So [facility] [place] [facility] [phone].    ATENCIÓN: Si habla español, tiene a junior disposición servicios gratuitos de asistencia lingüística. [facility] [phone].    We comply with applicable federal civil rights laws and [place] laws. We do not discriminate on the basis of race, color, national origin, age, disability, sex, sexual orientation, or gender identity.            Thank you!     Thank you for choosing [facility]  for your care. Our goal is always to provide you with excellent care. Hearing back from our patients is one way we can continue to improve our services. Please take a few minutes to complete the written survey that you may receive in the mail after your visit with us. Thank you!             Your Updated Medication List - " Protect others around you: Learn how to safely use, store and throw away your medicines at www.disposemymeds.org.          This list is accurate as of: 10/17/17 12:48 PM.  Always use your most recent med list.                   Brand Name Dispense Instructions for use Diagnosis    hydrochlorothiazide 50 MG tablet    HYDRODIURIL    90 tablet    Take 1 tablet (50 mg) by mouth daily    Hypercalciuria       simvastatin 40 MG tablet    ZOCOR    90 tablet    TAKE 1 TABLET (40 MG) BY MOUTH AT BEDTIME    Hyperlipidemia LDL goal <160       VITAMIN B 12 PO      Take 1 tablet by mouth daily        VITAMIN D3 PO      Take 1 tablet by mouth daily.

## 2017-10-27 ENCOUNTER — ALLIED HEALTH/NURSE VISIT (OUTPATIENT)
Dept: NURSING | Facility: CLINIC | Age: 57
End: 2017-10-27
Payer: COMMERCIAL

## 2017-10-27 DIAGNOSIS — Z23 NEED FOR PROPHYLACTIC VACCINATION AND INOCULATION AGAINST INFLUENZA: Primary | ICD-10-CM

## 2017-10-27 PROCEDURE — 90471 IMMUNIZATION ADMIN: CPT

## 2017-10-27 PROCEDURE — 90686 IIV4 VACC NO PRSV 0.5 ML IM: CPT

## 2017-10-27 NOTE — MR AVS SNAPSHOT
After Visit Summary   10/27/2017    Serjio Shields    MRN: [id number]           Patient Information     Date Of Birth          1960        Visit Information        Provider Department      10/27/2017 2:10 PM NE FLU CLINIC [facility]        Today's Diagnoses     Need for prophylactic vaccination and inoculation against influenza    -  1       Follow-ups after your visit        Who to contact     If you have questions or need follow up information about today's clinic visit or your schedule please contact [facility] directly at [phone].  Normal or non-critical lab and imaging results will be communicated to you by Tararihart, letter or phone within 4 business days after the clinic has received the results. If you do not hear from us within 7 days, please contact the clinic through [employer] or phone. If you have a critical or abnormal lab result, we will notify you by phone as soon as possible.  Submit refill requests through Playbasis or call your pharmacy and they will forward the refill request to us. Please allow 3 business days for your refill to be completed.          Additional Information About Your Visit        MyChart Information     Playbasis gives you secure access to your electronic health record. If you see a primary care provider, you can also send messages to your care team and make appointments. If you have questions, please call your primary care clinic.  If you do not have a primary care provider, please call [phone] and they will assist you.        Care EveryWhere ID     This is your Care EveryWhere ID. This could be used by other organizations to access your [place] medical records  [id number]         Blood Pressure from Last 3 Encounters:   10/17/17 114/82   05/09/17 124/78   04/25/17 118/77    Weight from Last 3 Encounters:   10/17/17 175 lb (79.4 kg)   05/09/17 175 lb (79.4 kg)   04/25/17 175 lb (79.4 kg)              We Performed  the Following     FLU VAC, SPLIT VIRUS IM > 3 YO (QUADRIVALENT) [12590]     Vaccine Administration, Initial [69878]        Primary Care Provider Office Phone # Fax #    Sony Kidd [phone] [address] [facility] [address]        Equal Access to Services     HARDIK ROSENTHAL : Hadii aad ku hadasho Soomaali, waaxda luqadaha, qaybta kaalmada adeegyada, waxay manfredin hayaan ademarco osmandamontim marx . So [facility] [phone].    ATENCIÓN: Si habla español, tiene a junior disposición servicios gratuitos de asistencia lingüística. Shanekaame al [phone].    We comply with applicable federal civil rights laws and [place] laws. We do not discriminate on the basis of race, color, national origin, age, disability, sex, sexual orientation, or gender identity.            Thank you!     Thank you for choosing [facility]  for your care. Our goal is always to provide you with excellent care. Hearing back from our patients is one way we can continue to improve our services. Please take a few minutes to complete the written survey that you may receive in the mail after your visit with us. Thank you!             Your Updated Medication List - Protect others around you: Learn how to safely use, store and throw away your medicines at www.disposemymeds.org.          This list is accurate as of: 10/27/17  2:12 PM.  Always use your most recent med list.                   Brand Name Dispense Instructions for use Diagnosis    hydrochlorothiazide 50 MG tablet    HYDRODIURIL    90 tablet    Take 1 tablet (50 mg) by mouth daily    Hypercalciuria       simvastatin 40 MG tablet    ZOCOR    90 tablet    TAKE 1 TABLET (40 MG) BY MOUTH AT BEDTIME    Hyperlipidemia LDL goal <160       VITAMIN B 12 PO      Take 1 tablet by mouth daily        VITAMIN D3 PO      Take 1 tablet by mouth daily.

## 2018-01-16 ENCOUNTER — TELEPHONE (OUTPATIENT)
Dept: FAMILY MEDICINE | Facility: CLINIC | Age: 58
End: 2018-01-16

## 2018-01-16 NOTE — TELEPHONE ENCOUNTER
Reason for Call:  Other call back    Detailed comments: Patient is wondering if we have the newest version of the shingles vaccine. Please call back to discuss.     Phone Number Patient can be reached at: Home number on file [phone] (home)    Best Time: anytime     Can we leave a detailed message on this number? YES    Call taken on 1/16/2018 at 3:30 PM by Eddie Gonzáles

## 2018-01-19 DIAGNOSIS — R82.994 HYPERCALCIURIA: ICD-10-CM

## 2018-01-19 RX ORDER — HYDROCHLOROTHIAZIDE 50 MG/1
TABLET ORAL
Qty: 90 TABLET | Refills: 3 | Status: CANCELLED | OUTPATIENT
Start: 2018-01-19

## 2018-01-19 NOTE — TELEPHONE ENCOUNTER
Spoke with pharmacy, we currently so not have the new version of the shingles vaccine, we should have more information by February or March.    Left detailed VM on patients phone.    Neha Veliz CMA ([facility])

## 2018-02-01 NOTE — TELEPHONE ENCOUNTER
[facility] LVM requesting update on Rx or that it be signed. Please advise at [phone].

## 2018-02-05 DIAGNOSIS — R82.994 HYPERCALCIURIA: ICD-10-CM

## 2018-02-05 RX ORDER — HYDROCHLOROTHIAZIDE 50 MG/1
50 TABLET ORAL DAILY
Qty: 90 TABLET | Refills: 1 | Status: SHIPPED | OUTPATIENT
Start: 2018-02-05 | End: 2018-07-27

## 2018-03-10 ENCOUNTER — TRANSFERRED RECORDS (OUTPATIENT)
Dept: HEALTH INFORMATION MANAGEMENT | Facility: CLINIC | Age: 58
End: 2018-03-10

## 2018-04-03 ENCOUNTER — OFFICE VISIT (OUTPATIENT)
Dept: DERMATOLOGY | Facility: CLINIC | Age: 58
End: 2018-04-03
Payer: COMMERCIAL

## 2018-04-03 VITALS — HEART RATE: 76 BPM | DIASTOLIC BLOOD PRESSURE: 92 MMHG | SYSTOLIC BLOOD PRESSURE: 138 MMHG

## 2018-04-03 DIAGNOSIS — L82.1 SEBORRHEIC KERATOSIS: ICD-10-CM

## 2018-04-03 DIAGNOSIS — R03.0 ELEVATED BLOOD PRESSURE READING: ICD-10-CM

## 2018-04-03 DIAGNOSIS — L57.0 AK (ACTINIC KERATOSIS): Primary | ICD-10-CM

## 2018-04-03 RX ORDER — FLUOROURACIL 50 MG/G
CREAM TOPICAL
Qty: 40 G | Refills: 1 | Status: SHIPPED | OUTPATIENT
Start: 2018-04-03 | End: 2018-11-13

## 2018-04-03 RX ORDER — CALCIPOTRIENE 50 UG/G
CREAM TOPICAL 2 TIMES DAILY
Qty: 60 G | Refills: 1 | Status: SHIPPED | OUTPATIENT
Start: 2018-04-03 | End: 2018-11-13

## 2018-04-03 ASSESSMENT — PAIN SCALES - GENERAL: PAINLEVEL: NO PAIN (0)

## 2018-04-03 NOTE — PROGRESS NOTES
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 There are a few areas he would like to discuss with the provider.         History of Present Illness:  Mr. Serjio Shields is a 57 year old male who presents as a follow-up for a skin check. The patient was last seen 7/25/17 when a NUB was biopsied on the R calf, read as a clear cell acanthoma. Since then he has been healthy, he has one spot that he thinks is new in the last few months but is not itchy, tender, or changing. No other new, pruritic, painful, or bleeding spots. He has a red spot on the L upper chest that he would like to have evaluated, as well as a rough patch on the scalp.     He wears sunscreen when outdoors as well as a hat. He avoids going out in the sun as he is so fair skinned, but wears sun protection when he is outdoors. No other concerns today.     Past Medical History:   Patient Active Problem List   Diagnosis     Kidney stone     Colon polyp     Family history of colon cancer     Family history of prostate cancer     Advanced directives, counseling/discussion     Hyperlipidemia LDL goal <130     History of pulmonary embolism     Seborrheic keratosis     Freckled skin     Past Medical History:   Diagnosis Date     Family history of colon cancer      Pulmonary emboli (H)      Pure hypercholesterolemia      Renal disease     kidney stones     Past Surgical History:   Procedure Laterality Date     COLONOSCOPY  1/16     CYSTOSCOPY, URETEROSCOPY, COMBINED Left 6/24/2015    Procedure: COMBINED CYSTOSCOPY, URETEROSCOPY;  Surgeon: Larry Elias MD;  Location: [facility] OR     SURGICAL HISTORY  OF -       meatus dilation in childhood     Family History:  Numerous skin cancers in father, pt is not sure what types.     Medications:  Current Outpatient Prescriptions   Medication Sig Dispense Refill     hydrochlorothiazide (HYDRODIURIL) 50 MG tablet Take 1 tablet (50 mg) by mouth daily 90 tablet 1     simvastatin (ZOCOR) 40 MG tablet TAKE 1 TABLET (40 MG) BY MOUTH AT BEDTIME 90 tablet 3     Cyanocobalamin (VITAMIN B 12 PO) Take 1 tablet by mouth daily        Cholecalciferol (VITAMIN D3 PO) Take 1 tablet by mouth daily.       Allergies   Allergen Reactions     Penicillins Unknown     Unsure of exact reaction, had it as an infant         Review of Systems:  -As per HPI  -Constitutional: The patient denies fatigue, fevers, chills, unintended weight loss, and night sweats.  -Skin: As above in HPI. No additional skin concerns.    Physical exam:  Vitals: BP (!) 138/92 (BP Location: Right arm, Patient Position: Chair, Cuff Size: Adult Regular)  Pulse 76  GEN: This is a well developed, well-nourished male in no acute distress, in a pleasant mood.    SKIN: Waist-up skin, which includes the head/face, neck, both arms, chest, back, abdomen, digits and/or nails was examined.  -Erythematous smooth papule at L neck base, dermatoscope exam reveals telangiectasias and several small areas of hypopigmentation  -5mm linear tan stuck on appearing papule at R neck base, dermatoscope exam reveals keratotic build up   -Gritty pink papule on scalp vertex  -Diffuse freckling on trunk and extremities   -No other lesions of concern on areas examined.     Impression/Plan:  1. Actinic keratoses - Discussed field therapy with efudex and dovonex to areas with most sun damage including scalp, face, upper chest. He is very interested in this, will have him complete a course before summer months. Will also treat papule on scalp with cryotherapy today.     Cryotherapy procedure note : After verbal consent and discussion of risks and benefits  including but no limited to dyspigmentation/scar, blister, infection, recurrence, lesion on the scalp vertex was treated with 1-2mm freeze border for 2 cycles with liquid nitrogen. Post cryotherapy instructions were provided.     Field therapy to be done in 1 week - dovonex 0.005% cream and efudex 5% cream, mix 50/50 and apply to scalp, face, nose, and upper chest for 4-5 days until irritation occurs    Follow up in one month     2. Seborrheic keratosis, non irritated - lesion at R neck base is c/w SK.     Benign nature was discussed.No further intervention required at this time.     3. Elevated blood pressure reading - follow-up with primary care    Follow-up in 4 weeks, earlier for new or changing lesions.     Staff Involved:  Scribed by Kiah Masterson, MS4 for Dr. Ceballos.        I agree with the PFSH and ROS as completed by the Medical Student. The remainder of the encounter was performed by me and scribed by the Medical Student. The scribed note accurately reflects my personal services and the medical decisions made by me. The cryotherapy procedure was done together.     Usha Ceballos MD  Professor and Chair  Department of Dermatology  [facility]

## 2018-04-03 NOTE — LETTER
4/3/2018       RE: Serjio Shields  [address] [facility] [address]     Dear Colleague,    Thank you for referring your patient, Serjio Shields, to the [facility] DERMATOLOGY at [facility]. Please see a copy of my visit note below.    [facility] Dermatology Note      Dermatology Problem List:  1.Actinic keratosis  -Field therapy with dovonex/efudex April 2018  -S/p cryotherapy to lesion on scalp April 2018  2.Poikiloderma  3. 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 There are a few areas he would like to discuss with the provider.         History of Present Illness:  Mr. Serjio Shields is a 57 year old male who presents as a follow-up for a skin check. The patient was last seen 7/25/17 when a NUB was biopsied on the R calf, read as a clear cell acanthoma. Since then he has been healthy, he has one spot that he thinks is new in the last few months but is not itchy, tender, or changing. No other new, pruritic, painful, or bleeding spots. He has a red spot on the L upper chest that he would like to have evaluated, as well as a rough patch on the scalp.     He wears sunscreen when outdoors as well as a hat. He avoids going out in the sun as he is so fair skinned, but wears sun protection when he is outdoors. No other concerns today.     Past Medical History:   Patient Active Problem List   Diagnosis     Kidney stone     Colon polyp     Family history of colon cancer     Family history of prostate cancer     Advanced directives, counseling/discussion     Hyperlipidemia LDL goal <130     History of pulmonary embolism     Seborrheic keratosis     Freckled skin     Past Medical History:   Diagnosis Date     Family history of colon cancer      Pulmonary emboli (H)      Pure hypercholesterolemia       Renal disease     kidney stones     Past Surgical History:   Procedure Laterality Date     COLONOSCOPY  1/16     CYSTOSCOPY, URETEROSCOPY, COMBINED Left 6/24/2015    Procedure: COMBINED CYSTOSCOPY, URETEROSCOPY;  Surgeon: Larry Elias MD;  Location: [facility] OR     SURGICAL HISTORY OF -       meatus dilation in childhood     Family History:  Numerous skin cancers in father, pt is not sure what types.     Medications:  Current Outpatient Prescriptions   Medication Sig Dispense Refill     hydrochlorothiazide (HYDRODIURIL) 50 MG tablet Take 1 tablet (50 mg) by mouth daily 90 tablet 1     simvastatin (ZOCOR) 40 MG tablet TAKE 1 TABLET (40 MG) BY MOUTH AT BEDTIME 90 tablet 3     Cyanocobalamin (VITAMIN B 12 PO) Take 1 tablet by mouth daily        Cholecalciferol (VITAMIN D3 PO) Take 1 tablet by mouth daily.       Allergies   Allergen Reactions     Penicillins Unknown     Unsure of exact reaction, had it as an infant         Review of Systems:  -As per HPI  -Constitutional: The patient denies fatigue, fevers, chills, unintended weight loss, and night sweats.  -Skin: As above in HPI. No additional skin concerns.    Physical exam:  Vitals: BP (!) 138/92 (BP Location: Right arm, Patient Position: Chair, Cuff Size: Adult Regular)  Pulse 76  GEN: This is a well developed, well-nourished male in no acute distress, in a pleasant mood.    SKIN: Waist-up skin, which includes the head/face, neck, both arms, chest, back, abdomen, digits and/or nails was examined.  -Erythematous smooth papule at L neck base, dermatoscope exam reveals telangiectasias and several small areas of hypopigmentation  -5mm linear tan stuck on appearing papule at R neck base, dermatoscope exam reveals keratotic build up   -Gritty pink papule on scalp vertex  -Diffuse freckling on trunk and extremities   -No other lesions of concern on areas examined.     Impression/Plan:  1. Actinic keratoses - Discussed field therapy with efudex and dovonex to areas  with most sun damage including scalp, face, upper chest. He is very interested in this, will have him complete a course before summer months. Will also treat papule on scalp with cryotherapy today.     Cryotherapy procedure note : After verbal consent and discussion of risks and benefits including but no limited to dyspigmentation/scar, blister, infection, recurrence, lesion on the scalp vertex was treated with 1-2mm freeze border for 2 cycles with liquid nitrogen. Post cryotherapy instructions were provided.     Field therapy to be done in 1 week - dovonex 0.005% cream and efudex 5% cream, mix 50/50 and apply to scalp, face, nose, and upper chest for 4-5 days until irritation occurs    Follow up in one month     2. Seborrheic keratosis, non irritated - lesion at R neck base is c/w SK.     Benign nature was discussed.No further intervention required at this time.     3. Elevated blood pressure reading - follow-up with primary care    Follow-up in 4 weeks, earlier for new or changing lesions.     Staff Involved:  Scribed by Kiah Masterson, MS4 for Dr. Ceballos.        I agree with the PFSH and ROS as completed by the Medical Student. The remainder of the encounter was performed by me and scribed by the Medical Student. The scribed note accurately reflects my personal services and the medical decisions made by me. The cryotherapy procedure was done together.     Usha Ceballos MD  Professor and Chair  Department of Dermatology  [facility]

## 2018-04-03 NOTE — NURSING NOTE
Dermatology Rooming Note    Serjio Shields's goals for this visit include:   Chief Complaint   Patient presents with     Derm Problem     Serjio is returning for an annual skin check. There are a few areas he would like to discuss with the provider.     Vida Grant LPN

## 2018-04-03 NOTE — MR AVS SNAPSHOT
After Visit Summary   4/3/2018    Serjio Shields    MRN: [id number]           Patient Information     Date Of Birth          1960        Visit Information        Provider Department      4/3/2018 3:50 PM Usha Ceballos MD [facility] Dermatology        Today's Diagnoses     AK (actinic keratosis)    -  1       Follow-ups after your visit        Your next 10 appointments already scheduled     May 07, 2018 12:50 PM CDT   (Arrive by 12:35 PM)   Return Visit with Usha Ceballos MD   [facility] Dermatology ([facility])    [address] [place] [id number] [phone]              Who to contact     Please call your clinic at [phone] to:    Ask questions about your health    Make or cancel appointments    Discuss your medicines    Learn about your test results    Speak to your doctor            Additional Information About Your Visit        MyChart Information     [employer] gives you secure access to your electronic health record. If you see a primary care provider, you can also send messages to your care team and make appointments. If you have questions, please call your primary care clinic.  If you do not have a primary care provider, please call [phone] and they will assist you.      [employer] is an electronic gateway that provides easy, online access to your medical records. With [employer], you can request a clinic appointment, read your test results, renew a prescription or communicate with your care team.     To access your existing account, please contact your [facility] or call [phone] for assistance.        Care EveryWhere ID     This is your Care EveryWhere ID. This could be used by other organizations to access your [facility] medical records  [id number]        Your Vitals Were     Pulse                   76            Blood Pressure from Last 3 Encounters:   04/03/18 (!)  138/92   10/17/17 114/82   05/09/17 124/78    Weight from Last 3 Encounters:   10/17/17 79.4 kg (175 lb)   05/09/17 79.4 kg (175 lb)   04/25/17 79.4 kg (175 lb)              Today, you had the following     No orders found for display         Today's Medication Changes          These changes are accurate as of 4/3/18  5:11 PM.  If you have any questions, ask your nurse or doctor.               Start taking these medicines.        Dose/Directions    calcipotriene 0.005 % cream   Commonly known as:  DOVONEX   Used for:  AK (actinic keratosis)   Started by:  Usha Ceballos MD        Apply topically 2 times daily Use twice daily for 4-5 days, mix 50/50 with efudex   Quantity:  60 g   Refills:  1       fluorouracil 5 % cream   Commonly known as:  EFUDEX   Used for:  AK (actinic keratosis)   Started by:  Usha Ceballos MD        Use twice daily for 4-5 days. Mix 50/50 with dovonex cream.   Quantity:  40 g   Refills:  1            Where to get your medicines      Some of these will need a paper prescription and others can be bought over the counter.  Ask your nurse if you have questions.     Bring a paper prescription for each of these medications     calcipotriene 0.005 % cream    fluorouracil 5 % cream                Primary Care Provider Office Phone # Fax #    Sony Fredy Kidd [phone]       [address] [place] [id number]        Equal Access to Services     ALEJANDRA RSOENTHAL AH: Hadii alice pucketto Sojony, waaxda luqadaha, qaybta kaalmada jose angel, waxay gricelda cloud. So [facility] [phone].    ATENCIÓN: Si habla denisañol, tiene a junior disposición servicios gratuitos de asistencia lingüística. Llame al [phone].    We comply with applicable federal civil rights laws and [place] laws. We do not discriminate on the basis of race, color, national origin, age, disability, sex, sexual orientation, or gender identity.            Thank you!      Thank you for choosing [facility] DERMATOLOGY  for your care. Our goal is always to provide you with excellent care. Hearing back from our patients is one way we can continue to improve our services. Please take a few minutes to complete the written survey that you may receive in the mail after your visit with us. Thank you!             Your Updated Medication List - Protect others around you: Learn how to safely use, store and throw away your medicines at www.disposemymeds.org.          This list is accurate as of 4/3/18  5:11 PM.  Always use your most recent med list.                   Brand Name Dispense Instructions for use Diagnosis    calcipotriene 0.005 % cream    DOVONEX    60 g    Apply topically 2 times daily Use twice daily for 4-5 days, mix 50/50 with efudex    AK (actinic keratosis)       fluorouracil 5 % cream    EFUDEX    40 g    Use twice daily for 4-5 days. Mix 50/50 with dovonex cream.    AK (actinic keratosis)       hydrochlorothiazide 50 MG tablet    HYDRODIURIL    90 tablet    Take 1 tablet (50 mg) by mouth daily    Hypercalciuria       simvastatin 40 MG tablet    ZOCOR    90 tablet    TAKE 1 TABLET (40 MG) BY MOUTH AT BEDTIME    Hyperlipidemia LDL goal <160       VITAMIN B 12 PO      Take 1 tablet by mouth daily        VITAMIN D3 PO      Take 1 tablet by mouth daily.

## 2018-04-11 ENCOUNTER — MYC MEDICAL ADVICE (OUTPATIENT)
Dept: DERMATOLOGY | Facility: CLINIC | Age: 58
End: 2018-04-11

## 2018-04-12 NOTE — TELEPHONE ENCOUNTER
Called patient to discuss his question. He applied 5FU/calcipotriene BID x 6 days. Was starting to get redness/tenderness after 4, but thought the cream was supposed to make the redness go away. As the normal course is 4 days or until redness appears, instructed him to stop using the creams now. Can expect to get a little more red over the next few days, then start to settle down. He expressed his understanding and agreement with the plan.     Thomas Saucedo MD  Dermatology Resident, PGY3

## 2018-04-14 PROBLEM — R03.0 ELEVATED BLOOD PRESSURE READING: Status: ACTIVE | Noted: 2018-04-14

## 2018-06-12 ENCOUNTER — RADIANT APPOINTMENT (OUTPATIENT)
Dept: CT IMAGING | Facility: CLINIC | Age: 58
End: 2018-06-12
Attending: INTERNAL MEDICINE
Payer: COMMERCIAL

## 2018-06-12 ENCOUNTER — OFFICE VISIT (OUTPATIENT)
Dept: NEPHROLOGY | Facility: CLINIC | Age: 58
End: 2018-06-12
Payer: COMMERCIAL

## 2018-06-12 VITALS
HEART RATE: 55 BPM | DIASTOLIC BLOOD PRESSURE: 75 MMHG | HEIGHT: 70 IN | WEIGHT: 170 LBS | SYSTOLIC BLOOD PRESSURE: 135 MMHG | BODY MASS INDEX: 24.34 KG/M2

## 2018-06-12 DIAGNOSIS — R82.994 HYPERCALCIURIA: ICD-10-CM

## 2018-06-12 DIAGNOSIS — N20.0 NEPHROLITHIASIS: ICD-10-CM

## 2018-06-12 DIAGNOSIS — N20.0 NEPHROLITHIASIS: Primary | ICD-10-CM

## 2018-06-12 ASSESSMENT — PAIN SCALES - GENERAL: PAINLEVEL: MODERATE PAIN (5)

## 2018-06-12 NOTE — LETTER
6/12/2018       RE: Serjio Shields  [address] [facility] [address]     Dear Colleague,    Thank you for referring your patient, Serjio Shields, to the [facility] UROLOGY AND INST FOR PROSTATE AND UROLOGIC CANCERS at [facility]. Please see a copy of my visit note below.    [facility] Nephrology Comprehensive Stone Clinic  06/12/2018     Serjio Shields MRN:[id number] YOB: 1960  Primary care provider: Sony Kidd  Requesting physician:   Larry Elias MD   PHYSICIANS  [address] [facility] [address]  [place] [address]    ASSESSMENT AND RECOMMENDATIONS:   Serjio Shields is a 56 year old male presenting for nephrolithiasis.    Recurrent nephrolithiasis:   Renal functions have been well preserved  calcium oxalate stones,   Risk is hypercalciuria which is in part dietary, low urine volume  He has had no stone recurrence since the last stone retrieved in 2015  Secondary to hx of occupation as a  he gets surveillance for stones   Stone protocol CT is oudered    Idiopathic hypercalciuria: worse with worsening urine sodium.  Discussed further decrease in dietary sodium.  - continue on HCTZ 50mg  - Repeat Ct scan if there is evidence of stones he will increase HCTZ to twice daily     He will repeat his renal panel today for follow up on kidney functions and electrolytes  RTC 52 weeks with litholink and low dose CT scan for kidney stones only      Kingsley Boyle MD    Attestation:  This patient has been seen and evaluated by me, Anita Hoff MD on 6/12/18 .  Discussed with the fellow or resident and agree with the findings and plan in this note.  I have reviewed  Medications, Vital Signs and Labs.    Anita Hoff MD  [facility]  Department of Medicine  Division of Renal Disease and Hypertension  [phone]           REASON FOR VISIT: nephrolithiasis with hypercalciuria    HISTORY OF PRESENT  ILLNESS:  Serjio Shields is a 57 year old man with recurrent kidney stones who has followed in stone clinic for several years with litholinks dating back to .  Two years ago he had stone recurrence diagnosed incidentally on CT that diagnosed PE (7/3/14).  He had pre-emptive stone removal (he is recreational ) on 6/24/15 with [facility] and stone basketing on 6/24/15 for stones seen on 5/26/15 CT.      Last imagin/26/15  Last Surgery: 2015 (I reviewed op report)  Stone Free yes     Since last visit he has not been working as either a  or a , he has taken a job at [employer] which he is able to do after saving money during previous occupations, he does plan on going back to be a  but for now is enjoying this low stress low thinking job at [employer].    Fluid intake includes primarily water and coffee    Sodium intake is variable denis he has to eat outside sec to his flight schedule , he has difficulty managing that    Calcium intake is variable based on his occupational needs and his ability to have access to high calcium foods.    I reviewed 24 hour urine chemstries:   Date 3/10/18 Date 17 notes   Volume 2.27 2.57 decreased   Calcium mg/d 358 539 improved   Sodium mmol/d 152 249 improved   Protein catabolic rate 0.8 1.0    Oxalate mg/d 42 36    Citrate mg/d 952 1090    pH 7.2 6.7    Uric acid mg/d 0.48 0.58    Potassium mmol/d 102 76    Magnesium mg/d 129 152      Super-saturation of calcium oxalate 8.48    Super-saturation calcium phosphate 2.57  Super-saturation uric acid 0.03    Family history is -ve for kidney stones    Getting laid off from his job as a , he is considering finding a job as a . 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    PAST MEDICAL HISTORY:  Past Medical History:   Diagnosis Date     Family history of colon cancer      Pulmonary emboli (H)      Pure hypercholesterolemia      Renal disease     kidney stones       PSH:  Past Surgical History:    Procedure Laterality Date     COLONOSCOPY  1/16     CYSTOSCOPY, URETEROSCOPY, COMBINED Left 6/24/2015    Procedure: COMBINED CYSTOSCOPY, URETEROSCOPY;  Surgeon: Larry Elias MD;  Location: [facility] OR     SURGICAL HISTORY OF -       meatus dilation in childhood        MEDICATIONS:  Current Outpatient Prescriptions   Medication Sig Dispense Refill     calcipotriene (DOVONEX) 0.005 % cream Apply topically 2 times daily Use twice daily for 4-5 days, mix 50/50 with efudex 60 g 1     Cholecalciferol (VITAMIN D3 PO) Take 1 tablet by mouth daily.       Cyanocobalamin (VITAMIN B 12 PO) Take 1 tablet by mouth daily        fluorouracil (EFUDEX) 5 % cream Use twice daily for 4-5 days. Mix 50/50 with dovonex cream. 40 g 1     hydrochlorothiazide (HYDRODIURIL) 50 MG tablet Take 1 tablet (50 mg) by mouth daily 90 tablet 1     simvastatin (ZOCOR) 40 MG tablet TAKE 1 TABLET (40 MG) BY MOUTH AT BEDTIME 90 tablet 3        ALLERGIES:    Allergies   Allergen Reactions     Penicillins Unknown     Unsure of exact reaction, had it as an infant       REVIEW OF SYSTEMS:  A 10 point review of systems was negative except as noted above.    SOCIAL HISTORY:   Social History     Social History     Marital status: Single     Spouse name: N/A     Number of children: N/A     Years of education: N/A     Occupational History     Not on file.     Social History Main Topics     Smoking status: Never Smoker     Smokeless tobacco: Never Used     Alcohol use Yes      Comment: rare use     Drug use: No     Sexual activity: Yes     Partners: Female     Other Topics Concern     Parent/Sibling W/ Cabg, Mi Or Angioplasty Before 65f 55m? No     Social History Narrative   , recreational     FAMILY MEDICAL HISTORY:   Family History   Problem Relation Age of Onset     C.A.D. Mother      stents     Hypertension Mother      Cancer - colorectal Mother      CANCER Mother      skin cancer     Colon Cancer Mother      CANCER Father      bladder,  "metastatic to bone, passed 2002     Skin Cancer Father      Other Cancer Father      Bladder     Prostate Cancer Paternal Grandfather      Hypertension Brother      DIABETES No family hx of      CEREBROVASCULAR DISEASE No family hx of      Breast Cancer No family hx of        PHYSICAL EXAM:   /75  Pulse 55  Ht 1.778 m (5' 10\")  Wt 77.1 kg (170 lb)  BMI 24.39 kg/m2     GENERAL APPEARANCE: alert and no distress  Head: NC/AT  EYES: no scleral icterus, gaze conjugate  HENT: mouth without ulcers or lesions  NECK: supple, no goiter  RESP: normal work of breathing, no cyanosis or clubbing   CV: no edema, warm and well perfused  : no Lu  SKIN: no rash, warm, dry  NEURO: face symmetric, mentation intact and speech normal  Psych: well groomed, affect full, pleasant, normal mental status  Musculoskeletal: grossly normal ROM of arms and legs without obvious joint abnormalities    LABS:   Electrolytes/Renal -   Recent Labs   Lab Test  05/09/17   0753  12/30/15   0935  10/30/15   1624   NA  142  139  140   POTASSIUM  3.9  4.4  4.0   CHLORIDE  107  103  106   CO2  26  31  31   BUN  9  13  14   CR  0.93  0.92  0.96   GLC  90  86  92   NYASIA  8.8  9.0  8.6   PHOS   --    --   2.1*       CBC -   Recent Labs   Lab Test  06/09/15   0806  11/07/14   0934  07/03/14   1734  07/03/14   1205   WBC   --   5.6  8.2  7.1   HGB  16.0  15.8  14.3  15.1   PLT   --   178  155  167       LFTs -   Recent Labs   Lab Test  10/30/15   1624  07/03/14   1205  01/03/12   0803  05/16/11   1646   ALKPHOS   --   88   --    --    BILITOTAL   --   0.6   --    --    ALT   --   22  37  22   AST   --   17   --    --    PROTTOTAL   --   7.3   --    --    ALBUMIN  3.4  3.9   --    --        Coags -   Recent Labs   Lab Test  10/07/14   0720 09/09/14 08/19/14   INR  2.10*  2.5*  2.8*       Iron Panel - No lab results found.    Endocrine - No lab results found.    IMAGING:  Ultrasound report from today reviewed by me - no kidney stone    Kingsley Boyle MD "           Again, thank you for allowing me to participate in the care of your patient.      Sincerely,    Anita Hoff MD

## 2018-06-12 NOTE — NURSING NOTE
"Chief Complaint   Patient presents with     Follow Up For     kidney stone prevention       Blood pressure 135/75, pulse 55, height 1.778 m (5' 10\"), weight 77.1 kg (170 lb). Body mass index is 24.39 kg/(m^2).    Patient Active Problem List   Diagnosis     Kidney stone     Colon polyp     Family history of colon cancer     Family history of prostate cancer     Advanced directives, counseling/discussion     Hyperlipidemia LDL goal <130     History of pulmonary embolism     Seborrheic keratosis     Freckled skin     Elevated blood pressure reading       Allergies   Allergen Reactions     Penicillins Unknown     Unsure of exact reaction, had it as an infant       Current Outpatient Prescriptions   Medication Sig Dispense Refill     calcipotriene (DOVONEX) 0.005 % cream Apply topically 2 times daily Use twice daily for 4-5 days, mix 50/50 with efudex 60 g 1     Cholecalciferol (VITAMIN D3 PO) Take 1 tablet by mouth daily.       Cyanocobalamin (VITAMIN B 12 PO) Take 1 tablet by mouth daily        fluorouracil (EFUDEX) 5 % cream Use twice daily for 4-5 days. Mix 50/50 with dovonex cream. 40 g 1     hydrochlorothiazide (HYDRODIURIL) 50 MG tablet Take 1 tablet (50 mg) by mouth daily 90 tablet 1     simvastatin (ZOCOR) 40 MG tablet TAKE 1 TABLET (40 MG) BY MOUTH AT BEDTIME 90 tablet 3       Social History   Substance Use Topics     Smoking status: Never Smoker     Smokeless tobacco: Never Used     Alcohol use Yes      Comment: rare use       Matilda Browning LPN  6/12/2018  9:18 AM    "

## 2018-06-12 NOTE — PATIENT INSTRUCTIONS
Dear Serjio Shields      Your were seen in the [facility].  Basic advice to avoid kidney stones  - Drink 120-140 ounces of fluid daily (urine volume should be 80 ounces per day)  - low sodium diet (less than 2400 mg sodium per day)  - plenty of dietary calcium.  Please have one high calcium food three times a day with meals - can be either 8 oz milk, 6 oz yogurt or 2 oz low sodium cheese or 8 oz calcium fortified OJ/dairy alternative)   ---- Total should be at least 1000 mg calcium a day from food  - Protein (meat) in moderation  - add lemon or lime to foods and beverages.  Consider 2-4 oz lemon or lime juice diluted in water.  I advise against lemonade since it is high in sugar and low in actual lemon juice.     [email or web address]    Today we discussed your risk profile for kidney stone, high risk secondary to low urine volume and high calcium and sodium in urine    At your next visit I anticipate we will discuss CT scan and residual stone burden if there is a stone.      We are suggesting the following medications:  Increase HCTZ to 50mg twice a day if the CT shows new stones  If there are new stones on the CT will repeat the litholink in 3 months    Please get the following tests done:  Renal panel in 2 wks once the HCTZ in increased    Please set up appointment with:  Dr Hoff in 1 yr    It was a pleasure meeting with you today. Thank you for allowing me and my team the privilege of caring for you today. YOU are the reason we are here, and I truly hope we provided you with the excellent service you deserve. Please let us know if there is anything else we can do for you so that we can be sure you are leaving completely satisfied with your care experience.    Take care!  Anita Hoff MD  Department of Medicine  Division of Renal Diseases and Hypertension  [facility]    Email: [email or web address]  You may reach a nurse by calling the  urology clinic at [phone]

## 2018-06-12 NOTE — MR AVS SNAPSHOT
After Visit Summary   6/12/2018    Serjio Shields    MRN: [id number]           Patient Information     Date Of Birth          1960        Visit Information        Provider Department      6/12/2018 9:20 AM Anita Hoff MD [facility] Urology and [facility] Urologic C[facility]        Today's Diagnoses     Nephrolithiasis    -  1    Hypercalciuria          Care Instructions    Dear Serjio Shields      Your were seen in the [facility].  Basic advice to avoid kidney stones  - Drink 120-140 ounces of fluid daily (urine volume should be 80 ounces per day)  - low sodium diet (less than 2400 mg sodium per day)  - plenty of dietary calcium.  Please have one high calcium food three times a day with meals - can be either 8 oz milk, 6 oz yogurt or 2 oz low sodium cheese or 8 oz calcium fortified OJ/dairy alternative)   ---- Total should be at least 1000 mg calcium a day from food  - Protein (meat) in moderation  - add lemon or lime to foods and beverages.  Consider 2-4 oz lemon or lime juice diluted in water.  I advise against lemonade since it is high in sugar and low in actual lemon juice.     [email or web address]    Today we discussed your risk profile for kidney stone, high risk secondary to low urine volume and high calcium and sodium in urine    At your next visit I anticipate we will discuss CT scan and residual stone burden if there is a stone.      We are suggesting the following medications:  Increase HCTZ to 50mg twice a day if the CT shows new stones  If there are new stones on the CT will repeat the litholink in 3 months    Please get the following tests done:  Renal panel in 2 wks once the HCTZ in increased    Please set up appointment with:  Dr Hoff in 1 yr    It was a pleasure meeting with you today. Thank you for allowing me and my team the privilege of caring for you today. YOU are the reason we are here, and I  truly hope we provided you with the excellent service you deserve. Please let us know if there is anything else we can do for you so that we can be sure you are leaving completely satisfied with your care experience.    Take care!  Anita Hoff MD  Department of Medicine  Division of Renal Diseases and Hypertension  [facility]    Email: [email or web address]  You may reach a nurse by calling the urology clinic at [phone]               Follow-ups after your visit        Follow-up notes from your care team     Return in about 1 year (around 6/12/2019).      Your next 10 appointments already scheduled     Jun 12, 2018  8:00 PM CDT   CT ABDOMEN PELVIS W/O CONTRAST with UCCT1   [facility] CT ([facility])    [address] [address] [phone]           Please bring any scans or X-rays taken at other hospitals, if similar tests were done. Also bring a list of your medicines, including vitamins, minerals and over-the-counter drugs. It is safest to leave personal items at home.  Be sure to tell your doctor:   If you have any allergies.   If there s any chance you are pregnant.   If you are breastfeeding.  How to prepare:   Do not eat or drink for 2 hours before your exam. If you need to take medicine, you may take it with small sips of water. (We may ask you to take liquid medicine as well.)   Please wear loose clothing, such as a sweat suit or jogging clothes. Avoid snaps, zippers and other metal. We may ask you to undress and put on a hospital gown.  Please arrive 30 minutes early for your CT. Once in the department you might be asked to drink water 15-20 minutes prior to your exam.  If indicated you may be asked to drink an oral contrast in advance of your CT.  If this is the case, the imaging team will let you know or be in contact with you prior to your appointment  Patients over 70 or patients with diabetes or kidney problems:    "If you haven t had a blood test (creatinine test) within the last 30 days, the Cardiologist/Radiologist may require you to get this test prior to your exam.  If you have diabetes:   Continue to take your metformin medication on the day of your exam  If you have any questions, please call the Imaging Department where you will have your exam.              Future tests that were ordered for you today     Open Future Orders        Priority Expected Expires Ordered    Renal panel Routine  6/12/2019 6/12/2018    Magnesium Routine  6/13/2019 6/12/2018    CT Abdomen pelvis w/o contrast Routine  6/12/2019 6/12/2018            Who to contact     Please call your clinic at [phone] to:    Ask questions about your health    Make or cancel appointments    Discuss your medicines    Learn about your test results    Speak to your doctor            Additional Information About Your Visit        Given [employer] Information     [employer] gives you secure access to your electronic health record. If you see a primary care provider, you can also send messages to your care team and make appointments. If you have questions, please call your primary care clinic.  If you do not have a primary care provider, please call [phone] and they will assist you.      [employer] is an electronic gateway that provides easy, online access to your medical records. With [employer], you can request a clinic appointment, read your test results, renew a prescription or communicate with your care team.     To access your existing account, please contact your [facility] or call [phone] for assistance.        Care EveryWhere ID     This is your Care EveryWhere ID. This could be used by other organizations to access your [place] medical records  [id number]        Your Vitals Were     Pulse Height BMI (Body Mass Index)             55 1.778 m (5' 10\") 24.39 kg/m2          Blood Pressure from Last 3 Encounters:   06/12/18 135/75 "   04/03/18 (!) 138/92   10/17/17 114/82    Weight from Last 3 Encounters:   06/12/18 77.1 kg (170 lb)   10/17/17 79.4 kg (175 lb)   05/09/17 79.4 kg (175 lb)               Primary Care Provider Office Phone # Fax #    Sony Fredy Kidd [phone]       [address] [facility] [address]        Equal Access to Services     HARDIK ROSENTHAL : Hadii aad ku hadasho Soomaali, waaxda luqadaha, qaybta kaalmada adeegyada, waxay idiin hayaan adeeg kharash lamary . So [facility] [phone].    ATENCIÓN: Si habla español, tiene a junior disposición servicios gratuitos de asistencia lingüística. [facility] [phone].    We comply with applicable federal civil rights laws and [place] laws. We do not discriminate on the basis of race, color, national origin, age, disability, sex, sexual orientation, or gender identity.            Thank you!     Thank you for choosing [facility] UROLOGY AND [facility] FOR PROSTATE AND UROLOGIC CANCERS  for your care. Our goal is always to provide you with excellent care. Hearing back from our patients is one way we can continue to improve our services. Please take a few minutes to complete the written survey that you may receive in the mail after your visit with us. Thank you!             Your Updated Medication List - Protect others around you: Learn how to safely use, store and throw away your medicines at www.disposemymeds.org.          This list is accurate as of 6/12/18 10:01 AM.  Always use your most recent med list.                   Brand Name Dispense Instructions for use Diagnosis    aspirin 81 MG tablet     30 tablet    Take 1 tablet (81 mg) by mouth daily        calcipotriene 0.005 % cream    DOVONEX    60 g    Apply topically 2 times daily Use twice daily for 4-5 days, mix 50/50 with efudex    AK (actinic keratosis)       fluorouracil 5 % cream    EFUDEX    40 g    Use twice daily for 4-5 days. Mix 50/50 with dovonex cream.    AK (actinic keratosis)        hydrochlorothiazide 50 MG tablet    HYDRODIURIL    90 tablet    Take 1 tablet (50 mg) by mouth daily    Hypercalciuria       simvastatin 40 MG tablet    ZOCOR    90 tablet    TAKE 1 TABLET (40 MG) BY MOUTH AT BEDTIME    Hyperlipidemia LDL goal <160       VITAMIN B 12 PO      Take 1 tablet by mouth daily        VITAMIN D3 PO      Take 1 tablet by mouth daily.

## 2018-06-12 NOTE — PROGRESS NOTES
[facility] Nephrology Comprehensive Stone Clinic  06/12/2018     Serjio Shields MRN:[id number] YOB: 1960  Primary care provider: Sony Kidd  Requesting physician:   Larry Elias MD   [facility]IC[facility]ANS  [address]  [place] [address]    ASSESSMENT AND RECOMMENDATIONS:   Serjio Shields is a 56 year old male presenting for nephrolithiasis.    Recurrent nephrolithiasis:   Renal functions have been well preserved  calcium oxalate stones,   Risk is hypercalciuria which is in part dietary, low urine volume  He has had no stone recurrence since the last stone retrieved in 2015  Secondary to hx of occupation as a  he gets surveillance for stones   Stone protocol CT is oudered    Idiopathic hypercalciuria: worse with worsening urine sodium.  Discussed further decrease in dietary sodium.  - continue on HCTZ 50mg  - Repeat Ct scan if there is evidence of stones he will increase HCTZ to twice daily     He will repeat his renal panel today for follow up on kidney functions and electrolytes  RTC 52 weeks with litholink and low dose CT scan for kidney stones only      Kingsley Boyle MD    Attestation:  This patient has been seen and evaluated by me, Anita Hoff MD on 6/12/18 .  Discussed with the fellow or resident and agree with the findings and plan in this note.  I have reviewed  Medications, Vital Signs and Labs.    Anita Hoff MD  [facility]  Department of Medicine  Division of Renal Disease and Hypertension  [phone]           REASON FOR VISIT: nephrolithiasis with hypercalciuria    HISTORY OF PRESENT ILLNESS:  Serjio Shields is a 57 year old man with recurrent kidney stones who has followed in stone clinic for several years with litholinks dating back to 2008.  Two years ago he had stone recurrence diagnosed incidentally on CT that diagnosed PE (7/3/14).  He had pre-emptive stone removal (he is recreational ) on 6/24/15  with URS and stone basketing on 6/24/15 for stones seen on 5/26/15 CT.      Last imagin/26/15  Last Surgery: 2015 (I reviewed op report)  Stone Free yes     Since last visit he has not been working as either a  or a , he has taken a job at [employer] which he is able to do after saving money during previous occupations, he does plan on going back to be a  but for now is enjoying this low stress low thinking job at [employer].    Fluid intake includes primarily water and coffee    Sodium intake is variable denis he has to eat outside sec to his flight schedule , he has difficulty managing that    Calcium intake is variable based on his occupational needs and his ability to have access to high calcium foods.    I reviewed 24 hour urine chemstries:   Date 3/10/18 Date 17 notes   Volume 2.27 2.57 decreased   Calcium mg/d 358 539 improved   Sodium mmol/d 152 249 improved   Protein catabolic rate 0.8 1.0    Oxalate mg/d 42 36    Citrate mg/d 952 1090    pH 7.2 6.7    Uric acid mg/d 0.48 0.58    Potassium mmol/d 102 76    Magnesium mg/d 129 152      Super-saturation of calcium oxalate 8.48    Super-saturation calcium phosphate 2.57  Super-saturation uric acid 0.03    Family history is -ve for kidney stones    Getting laid off from his job as a , he is considering finding a job as a . 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    PAST MEDICAL HISTORY:  Past Medical History:   Diagnosis Date     Family history of colon cancer      Pulmonary emboli (H)      Pure hypercholesterolemia      Renal disease     kidney stones       PSH:  Past Surgical History:   Procedure Laterality Date     COLONOSCOPY       CYSTOSCOPY, URETEROSCOPY, COMBINED Left 2015    Procedure: COMBINED CYSTOSCOPY, URETEROSCOPY;  Surgeon: Larry Elias MD;  Location: [facility] OR     SURGICAL HISTORY OF -       meatus dilation in childhood        MEDICATIONS:  Current Outpatient Prescriptions   Medication  "Sig Dispense Refill     calcipotriene (DOVONEX) 0.005 % cream Apply topically 2 times daily Use twice daily for 4-5 days, mix 50/50 with efudex 60 g 1     Cholecalciferol (VITAMIN D3 PO) Take 1 tablet by mouth daily.       Cyanocobalamin (VITAMIN B 12 PO) Take 1 tablet by mouth daily        fluorouracil (EFUDEX) 5 % cream Use twice daily for 4-5 days. Mix 50/50 with dovonex cream. 40 g 1     hydrochlorothiazide (HYDRODIURIL) 50 MG tablet Take 1 tablet (50 mg) by mouth daily 90 tablet 1     simvastatin (ZOCOR) 40 MG tablet TAKE 1 TABLET (40 MG) BY MOUTH AT BEDTIME 90 tablet 3        ALLERGIES:    Allergies   Allergen Reactions     Penicillins Unknown     Unsure of exact reaction, had it as an infant       REVIEW OF SYSTEMS:  A 10 point review of systems was negative except as noted above.    SOCIAL HISTORY:   Social History     Social History     Marital status: Single     Spouse name: N/A     Number of children: N/A     Years of education: N/A     Occupational History     Not on file.     Social History Main Topics     Smoking status: Never Smoker     Smokeless tobacco: Never Used     Alcohol use Yes      Comment: rare use     Drug use: No     Sexual activity: Yes     Partners: Female     Other Topics Concern     Parent/Sibling W/ Cabg, Mi Or Angioplasty Before 65f 55m? No     Social History Narrative   , recreational     FAMILY MEDICAL HISTORY:   Family History   Problem Relation Age of Onset     C.A.D. Mother      stents     Hypertension Mother      Cancer - colorectal Mother      CANCER Mother      skin cancer     Colon Cancer Mother      CANCER Father      bladder, metastatic to bone, passed 2002     Skin Cancer Father      Other Cancer Father      Bladder     Prostate Cancer Paternal Grandfather      Hypertension Brother      DIABETES No family hx of      CEREBROVASCULAR DISEASE No family hx of      Breast Cancer No family hx of        PHYSICAL EXAM:   /75  Pulse 55  Ht 1.778 m (5' 10\")  " Wt 77.1 kg (170 lb)  BMI 24.39 kg/m2     GENERAL APPEARANCE: alert and no distress  Head: NC/AT  EYES: no scleral icterus, gaze conjugate  HENT: mouth without ulcers or lesions  NECK: supple, no goiter  RESP: normal work of breathing, no cyanosis or clubbing   CV: no edema, warm and well perfused  : no Lu  SKIN: no rash, warm, dry  NEURO: face symmetric, mentation intact and speech normal  Psych: well groomed, affect full, pleasant, normal mental status  Musculoskeletal: grossly normal ROM of arms and legs without obvious joint abnormalities    LABS:   Electrolytes/Renal -   Recent Labs   Lab Test  05/09/17   0753  12/30/15   0935  10/30/15   1624   NA  142  139  140   POTASSIUM  3.9  4.4  4.0   CHLORIDE  107  103  106   CO2  26  31  31   BUN  9  13  14   CR  0.93  0.92  0.96   GLC  90  86  92   NYASIA  8.8  9.0  8.6   PHOS   --    --   2.1*       CBC -   Recent Labs   Lab Test  06/09/15   0806  11/07/14   0934  07/03/14   1734  07/03/14   1205   WBC   --   5.6  8.2  7.1   HGB  16.0  15.8  14.3  15.1   PLT   --   178  155  167       LFTs -   Recent Labs   Lab Test  10/30/15   1624  07/03/14   1205  01/03/12   0803  05/16/11   1646   ALKPHOS   --   88   --    --    BILITOTAL   --   0.6   --    --    ALT   --   22  37  22   AST   --   17   --    --    PROTTOTAL   --   7.3   --    --    ALBUMIN  3.4  3.9   --    --        Coags -   Recent Labs   Lab Test  10/07/14   0720 09/09/14 08/19/14   INR  2.10*  2.5*  2.8*       Iron Panel - No lab results found.    Endocrine - No lab results found.    IMAGING:  Ultrasound report from today reviewed by me - no kidney stone    Kingsley Boyle MD

## 2018-06-25 DIAGNOSIS — R82.994 HYPERCALCIURIA: ICD-10-CM

## 2018-06-25 DIAGNOSIS — N20.0 RECURRENT KIDNEY STONES: ICD-10-CM

## 2018-06-25 DIAGNOSIS — Z12.5 SPECIAL SCREENING FOR MALIGNANT NEOPLASM OF PROSTATE: ICD-10-CM

## 2018-06-25 DIAGNOSIS — N20.0 NEPHROLITHIASIS: ICD-10-CM

## 2018-06-25 DIAGNOSIS — E78.5 HYPERLIPIDEMIA LDL GOAL <130: ICD-10-CM

## 2018-06-25 DIAGNOSIS — Z80.42 FAMILY HISTORY OF PROSTATE CANCER: ICD-10-CM

## 2018-06-25 DIAGNOSIS — Z51.81 ENCOUNTER FOR THERAPEUTIC DRUG MONITORING: ICD-10-CM

## 2018-06-25 LAB
ALBUMIN SERPL-MCNC: 3.4 G/DL (ref 3.4–5)
ANION GAP SERPL CALCULATED.3IONS-SCNC: 6 MMOL/L (ref 3–14)
BUN SERPL-MCNC: 20 MG/DL (ref 7–30)
CALCIUM SERPL-MCNC: 8.5 MG/DL (ref 8.5–10.1)
CHLORIDE SERPL-SCNC: 107 MMOL/L (ref 94–109)
CO2 SERPL-SCNC: 31 MMOL/L (ref 20–32)
CREAT SERPL-MCNC: 0.89 MG/DL (ref 0.66–1.25)
GFR SERPL CREATININE-BSD FRML MDRD: 88 ML/MIN/1.7M2
GLUCOSE SERPL-MCNC: 97 MG/DL (ref 70–99)
MAGNESIUM SERPL-MCNC: 2 MG/DL (ref 1.6–2.3)
PHOSPHATE SERPL-MCNC: 2.8 MG/DL (ref 2.5–4.5)
POTASSIUM SERPL-SCNC: 3.7 MMOL/L (ref 3.4–5.3)
SODIUM SERPL-SCNC: 144 MMOL/L (ref 133–144)

## 2018-06-25 PROCEDURE — 80061 LIPID PANEL: CPT | Performed by: FAMILY MEDICINE

## 2018-06-25 PROCEDURE — 80069 RENAL FUNCTION PANEL: CPT | Performed by: INTERNAL MEDICINE

## 2018-06-25 PROCEDURE — 83735 ASSAY OF MAGNESIUM: CPT | Performed by: INTERNAL MEDICINE

## 2018-06-25 PROCEDURE — 36415 COLL VENOUS BLD VENIPUNCTURE: CPT | Performed by: INTERNAL MEDICINE

## 2018-06-25 PROCEDURE — G0103 PSA SCREENING: HCPCS | Performed by: FAMILY MEDICINE

## 2018-06-26 LAB
CHOLEST SERPL-MCNC: 164 MG/DL
HDLC SERPL-MCNC: 48 MG/DL
LDLC SERPL CALC-MCNC: 95 MG/DL
NONHDLC SERPL-MCNC: 116 MG/DL
PSA SERPL-ACNC: 1.07 UG/L (ref 0–4)
TRIGL SERPL-MCNC: 107 MG/DL

## 2018-07-26 ENCOUNTER — TELEPHONE (OUTPATIENT)
Dept: NEPHROLOGY | Facility: CLINIC | Age: 58
End: 2018-07-26

## 2018-07-26 NOTE — TELEPHONE ENCOUNTER
[facility] Call Center    Phone Message    May a detailed message be left on voicemail: yes    Reason for Call: Medication Refill Request    Has the patient contacted the pharmacy for the refill? Yes   Name of medication being requested: hydrochlorothiazide (HYDRODIURIL) 50 MG tablet  Provider who prescribed the medication: Dr. Hoff  Pharmacy: [facility]  Date medication is needed: 7/26/2018     Action Taken: Message routed to:  [facility] Nephrology

## 2018-07-27 DIAGNOSIS — R82.994 HYPERCALCIURIA: ICD-10-CM

## 2018-07-27 RX ORDER — HYDROCHLOROTHIAZIDE 50 MG/1
50 TABLET ORAL DAILY
Qty: 90 TABLET | Refills: 1 | Status: SHIPPED | OUTPATIENT
Start: 2018-07-27 | End: 2018-11-13

## 2018-07-27 RX ORDER — HYDROCHLOROTHIAZIDE 50 MG/1
50 TABLET ORAL DAILY
Qty: 90 TABLET | Refills: 1 | Status: CANCELLED | OUTPATIENT
Start: 2018-07-27

## 2018-10-17 DIAGNOSIS — E78.5 HYPERLIPIDEMIA LDL GOAL <160: ICD-10-CM

## 2018-10-17 RX ORDER — SIMVASTATIN 40 MG
TABLET ORAL
Qty: 30 TABLET | Refills: 0 | Status: SHIPPED | OUTPATIENT
Start: 2018-10-17 | End: 2018-11-13

## 2018-10-17 NOTE — TELEPHONE ENCOUNTER
Patient is due for a visit.  Yuli given with note and patient notified via phone to schedule.    Kimber Cline RN

## 2018-10-17 NOTE — TELEPHONE ENCOUNTER
"Requested Prescriptions   Pending Prescriptions Disp Refills     simvastatin (ZOCOR) 40 MG tablet [Pharmacy Med Name: SIMVASTATIN 40 MG TABLET]  Last Written Prescription Date:  9/25/2017  Last Fill Quantity: 90 tabs,  # refills: 3   Last office visit: 10/17/2017 with prescribing provider:  Kareem   Future Office Visit:     90 tablet 3     Sig: TAKE 1 TABLET (40 MG) BY MOUTH AT BEDTIME    Statins Protocol Failed    10/17/2018  1:33 AM       Failed - Recent (12 mo) or future (30 days) visit within the authorizing provider's specialty    Patient had office visit in the last 12 months or has a visit in the next 30 days with authorizing provider or within the authorizing provider's specialty.  See \"Patient Info\" tab in inbasket, or \"Choose Columns\" in Meds & Orders section of the refill encounter.             Passed - LDL on file in past 12 months    Recent Labs   Lab Test  06/25/18   0750   LDL  95            Passed - No abnormal creatine kinase in past 12 months    No lab results found.            Passed - Patient is age 18 or older          "

## 2018-11-13 ENCOUNTER — OFFICE VISIT (OUTPATIENT)
Dept: FAMILY MEDICINE | Facility: CLINIC | Age: 58
End: 2018-11-13
Payer: COMMERCIAL

## 2018-11-13 VITALS
WEIGHT: 174 LBS | HEART RATE: 72 BPM | TEMPERATURE: 98.1 F | HEIGHT: 70 IN | DIASTOLIC BLOOD PRESSURE: 74 MMHG | SYSTOLIC BLOOD PRESSURE: 128 MMHG | BODY MASS INDEX: 24.91 KG/M2

## 2018-11-13 DIAGNOSIS — E78.5 HYPERLIPIDEMIA LDL GOAL <160: ICD-10-CM

## 2018-11-13 DIAGNOSIS — R25.1 TREMOR: ICD-10-CM

## 2018-11-13 DIAGNOSIS — R82.994 HYPERCALCIURIA: Primary | ICD-10-CM

## 2018-11-13 DIAGNOSIS — B35.4 TINEA CORPORIS: ICD-10-CM

## 2018-11-13 DIAGNOSIS — Z23 NEED FOR PROPHYLACTIC VACCINATION AND INOCULATION AGAINST INFLUENZA: ICD-10-CM

## 2018-11-13 PROCEDURE — 90471 IMMUNIZATION ADMIN: CPT | Performed by: FAMILY MEDICINE

## 2018-11-13 PROCEDURE — 90686 IIV4 VACC NO PRSV 0.5 ML IM: CPT | Performed by: FAMILY MEDICINE

## 2018-11-13 PROCEDURE — 99214 OFFICE O/P EST MOD 30 MIN: CPT | Mod: 25 | Performed by: FAMILY MEDICINE

## 2018-11-13 RX ORDER — HYDROCHLOROTHIAZIDE 50 MG/1
50 TABLET ORAL DAILY
Qty: 90 TABLET | Refills: 3 | Status: SHIPPED | OUTPATIENT
Start: 2018-11-13 | End: 2019-10-23

## 2018-11-13 RX ORDER — CLOTRIMAZOLE 1 %
CREAM (GRAM) TOPICAL 2 TIMES DAILY
Qty: 30 G | Refills: 3 | Status: SHIPPED | OUTPATIENT
Start: 2018-11-13 | End: 2020-01-16

## 2018-11-13 RX ORDER — SIMVASTATIN 40 MG
TABLET ORAL
Qty: 90 TABLET | Refills: 3 | Status: SHIPPED | OUTPATIENT
Start: 2018-11-13 | End: 2019-10-23

## 2018-11-13 NOTE — MR AVS SNAPSHOT
After Visit Summary   11/13/2018    Serjio Shields    MRN: [id number]           Patient Information     Date Of Birth          1960        Visit Information        Provider Department      11/13/2018 6:40 AM Sony Kidd MD [facility]        Today's Diagnoses     Hypercalciuria    -  1    Tinea corporis        Hyperlipidemia LDL goal <160        Tremor        Need for prophylactic vaccination and inoculation against influenza          Care Instructions    Orders Placed This Encounter     FLU VACCINE, SPLIT VIRUS, IM (QUADRIVALENT) [25908]- >3 YRS     Vaccine Administration, Initial [95636]     hydrochlorothiazide (HYDRODIURIL) 50 MG tablet     Sig: Take 1 tablet (50 mg) by mouth daily     Dispense:  90 tablet     Refill:  3     simvastatin (ZOCOR) 40 MG tablet     Sig: TAKE 1 TABLET (40 MG) BY MOUTH AT BEDTIME     Dispense:  90 tablet     Refill:  3     clotrimazole (LOTRIMIN) 1 % cream     Sig: Apply topically 2 times daily     Dispense:  30 g     Refill:  3     Consider shingles vaccine. It may be best to get at pharmacy to know the cost. Your need 2 shots with the second sometime 2 months after the first.             Follow-ups after your visit        Follow-up notes from your care team     Return in about 6 months (around 5/13/2019) for Routine Visit.      Who to contact     If you have questions or need follow up information about today's clinic visit or your schedule please contact [facility] directly at [phone].  Normal or non-critical lab and imaging results will be communicated to you by MyChart, letter or phone within 4 business days after the clinic has received the results. If you do not hear from us within 7 days, please contact the clinic through MyChart or phone. If you have a critical or abnormal lab result, we will notify you by phone as soon as possible.  Submit refill requests through [employer] or call your pharmacy and they  "will forward the refill request to us. Please allow 3 business days for your refill to be completed.          Additional Information About Your Visit        Prescription [employer] Social Information     [employer] gives you secure access to your electronic health record. If you see a primary care provider, you can also send messages to your care team and make appointments. If you have questions, please call your primary care clinic.  If you do not have a primary care provider, please call [phone] and they will assist you.        Care EveryWhere ID     This is your Care EveryWhere ID. This could be used by other organizations to access your [place] medical records  [id number]        Your Vitals Were     Pulse Temperature Height BMI (Body Mass Index)          72 98.1  F (36.7  C) (Oral) 5' 10\" (1.778 m) 24.97 kg/m2         Blood Pressure from Last 3 Encounters:   11/13/18 128/74   06/12/18 135/75   04/03/18 (!) 138/92    Weight from Last 3 Encounters:   11/13/18 174 lb (78.9 kg)   06/12/18 170 lb (77.1 kg)   10/17/17 175 lb (79.4 kg)              We Performed the Following     FLU VACCINE, SPLIT VIRUS, IM (QUADRIVALENT) [48731]- >3 YRS     Vaccine Administration, Initial [82374]          Today's Medication Changes          These changes are accurate as of 11/13/18  7:37 AM.  If you have any questions, ask your nurse or doctor.               Start taking these medicines.        Dose/Directions    clotrimazole 1 % cream   Commonly known as:  LOTRIMIN   Used for:  Tinea corporis   Started by:  Sony Kidd MD        Apply topically 2 times daily   Quantity:  30 g   Refills:  3         Stop taking these medicines if you haven't already. Please contact your care team if you have questions.     calcipotriene 0.005 % cream   Commonly known as:  DOVONEX   Stopped by:  Sony Kidd MD           fluorouracil 5 % cream   Commonly known as:  EFUDEX   Stopped by:  Sony Kidd MD                Where to get your " medicines      These medications were sent to [id number] - [place], [place] - [address], [facility] [address]     Phone:  [phone]     clotrimazole 1 % cream    hydrochlorothiazide 50 MG tablet    simvastatin 40 MG tablet                Primary Care Provider Office Phone # Fax #    Sony Kidd [phone]       [address] [facility] [address]        Equal Access to Services     HARDIK ROSENTHAL : Hadii aad ku hadasho Soomaali, waaxda luqadaha, qaybta kaalmada adeegyada, waxay idiin hayaan adeeg kharash lamary cloud. So [facility] [phone].    ATENCIÓN: Si habla español, tiene a junior disposición servicios gratuitos de asistencia lingüística. [facility] [phone].    We comply with applicable federal civil rights laws and [place] laws. We do not discriminate on the basis of race, color, national origin, age, disability, sex, sexual orientation, or gender identity.            Thank you!     Thank you for choosing [facility]  for your care. Our goal is always to provide you with excellent care. Hearing back from our patients is one way we can continue to improve our services. Please take a few minutes to complete the written survey that you may receive in the mail after your visit with us. Thank you!             Your Updated Medication List - Protect others around you: Learn how to safely use, store and throw away your medicines at www.disposemymeds.org.          This list is accurate as of 11/13/18  7:37 AM.  Always use your most recent med list.                   Brand Name Dispense Instructions for use Diagnosis    aspirin 81 MG tablet     30 tablet    Take 1 tablet (81 mg) by mouth daily        clotrimazole 1 % cream    LOTRIMIN    30 g    Apply topically 2 times daily    Tinea corporis       hydrochlorothiazide 50 MG tablet    HYDRODIURIL    90 tablet    Take 1 tablet (50 mg) by mouth daily    Hypercalciuria        simvastatin 40 MG tablet    ZOCOR    90 tablet    TAKE 1 TABLET (40 MG) BY MOUTH AT BEDTIME    Hyperlipidemia LDL goal <160       VITAMIN B 12 PO      Take 1 tablet by mouth daily        VITAMIN D3 PO      Take 1 tablet by mouth daily.

## 2018-11-13 NOTE — PROGRESS NOTES
"  SUBJECTIVE:   Serjio Shields is a 58 year old male who presents to clinic today for the following health issues:    Patient would like Shingles vaccine and Flu vaccine today.  Informed patient that he should call insurance or stop at pharmacy to check on coverage of shingles.      Hyperlipidemia Follow-Up      Rate your low fat/cholesterol diet?: \"kind of\"    Taking statin?  Yes, no muscle aches from statin    Other lipid medications/supplements?:  none        Followed by urology for nephrolithiasis and hypercalcuria. Needs refill of medicaitons            Patient has rash on leg for 1 rob. Itchy.     Also notes fine tremor for 2-3 months. Father had tremor starting in his 70's. No hx of parkinson's      Problem list and histories reviewed & adjusted, as indicated.  Additional history: as documented    BP Readings from Last 3 Encounters:   11/13/18 128/74   06/12/18 135/75   04/03/18 (!) 138/92    Wt Readings from Last 3 Encounters:   11/13/18 174 lb (78.9 kg)   06/12/18 170 lb (77.1 kg)   10/17/17 175 lb (79.4 kg)                    Reviewed and updated as needed this visit by clinical staff       Reviewed and updated as needed this visit by Provider         ROS:  Constitutional, HEENT, cardiovascular, pulmonary, gi and gu systems are negative, except as otherwise noted.    OBJECTIVE:     /74  Pulse 72  Temp 98.1  F (36.7  C) (Oral)  Ht 5' 10\" (1.778 m)  Wt 174 lb (78.9 kg)  BMI 24.97 kg/m2  Body mass index is 24.97 kg/(m^2).   GENERAL: healthy, alert and no distress  NECK: no adenopathy, no asymmetry, masses, or scars and thyroid normal to palpation  RESP: lungs clear to auscultation - no rales, rhonchi or wheezes  CV: regular rate and rhythm, normal S1 S2, no S3 or S4, no murmur, click or rub, no peripheral edema and peripheral pulses strong  ABDOMEN: soft, nontender, no hepatosplenomegaly, no masses and bowel sounds normal  MS: no gross musculoskeletal defects noted, no edema  SKIN: 2 cm circular " patch on left lower leg. scaly with raised margins  NEURO: Normal strength and tone, sensory exam grossly normal, mentation intact, cranial nerves 2-12 intact, DTR's normal and symmetric , gait normal including heel/toe/tandem walking, Romberg normal, rapid alternating movements normal and very fine tremor but almost not detectable. No cogwheel rigidity    Diagnostic Test Results:  none     ASSESSMENT:       PLAN:   (    (R82.994) Hypercalciuria  Comment: needs refill  Plan: hydrochlorothiazide (HYDRODIURIL) 50 MG tablet            (E78.5) Hyperlipidemia LDL goal <160  Comment: controlled  Plan: simvastatin (ZOCOR) 40 MG tablet         Continue present medications      (B35.4) Tinea corporis  Comment:   Plan: clotrimazole (LOTRIMIN) 1 % cream            (R25.1) Tremor  Comment: probable essential tremor  Plan: monitor and if worsens consider neuro referral    Z23) Need for prophylactic vaccination and inoculation against influenza    Comment:   Plan: FLU VACCINE, SPLIT VIRUS, IM (QUADRIVALENT)         [63786]- >3 YRS, Vaccine Administration,         Initial [04011]                  See Patient Instructions    Sony Kidd MD, MD  [facility]      Injectable Influenza Immunization Documentation    1.  Is the person to be vaccinated sick today?   No    2. Does the person to be vaccinated have an allergy to a component   of the vaccine?   No  Egg Allergy Algorithm Link    3. Has the person to be vaccinated ever had a serious reaction   to influenza vaccine in the past?   No    4. Has the person to be vaccinated ever had Guillain-Barré syndrome?   No    Form completed by patient

## 2018-11-13 NOTE — PATIENT INSTRUCTIONS
Orders Placed This Encounter     FLU VACCINE, SPLIT VIRUS, IM (QUADRIVALENT) [77664]- >3 YRS     Vaccine Administration, Initial [07595]     hydrochlorothiazide (HYDRODIURIL) 50 MG tablet     Sig: Take 1 tablet (50 mg) by mouth daily     Dispense:  90 tablet     Refill:  3     simvastatin (ZOCOR) 40 MG tablet     Sig: TAKE 1 TABLET (40 MG) BY MOUTH AT BEDTIME     Dispense:  90 tablet     Refill:  3     clotrimazole (LOTRIMIN) 1 % cream     Sig: Apply topically 2 times daily     Dispense:  30 g     Refill:  3     Consider shingles vaccine. It may be best to get at pharmacy to know the cost. Your need 2 shots with the second sometime 2 months after the first.

## 2018-12-31 NOTE — TELEPHONE ENCOUNTER
FUTURE VISIT INFORMATION      FUTURE VISIT INFORMATION:    Date: 1/3/19    Time:     Location: [facility]  REFERRAL INFORMATION:    Referring provider:  self    Referring providers clinic:      Reason for visit/diagnosis  Finger joint injury to little finger of right hand, self referred, no previous records, scheduled per pt    RECORDS REQUESTED FROM:       Clinic name Comments Records Status Imaging Status

## 2019-01-03 ENCOUNTER — OFFICE VISIT (OUTPATIENT)
Dept: ORTHOPEDICS | Facility: CLINIC | Age: 59
End: 2019-01-03
Payer: COMMERCIAL

## 2019-01-03 ENCOUNTER — ANCILLARY PROCEDURE (OUTPATIENT)
Dept: GENERAL RADIOLOGY | Facility: CLINIC | Age: 59
End: 2019-01-03
Payer: COMMERCIAL

## 2019-01-03 ENCOUNTER — PRE VISIT (OUTPATIENT)
Dept: ORTHOPEDICS | Facility: CLINIC | Age: 59
End: 2019-01-03

## 2019-01-03 VITALS — HEIGHT: 70 IN | RESPIRATION RATE: 16 BRPM | WEIGHT: 180 LBS | BODY MASS INDEX: 25.77 KG/M2

## 2019-01-03 DIAGNOSIS — S63.636A SPRAIN OF INTERPHALANGEAL JOINT OF RIGHT LITTLE FINGER, INITIAL ENCOUNTER: Primary | ICD-10-CM

## 2019-01-03 DIAGNOSIS — M79.644 PAIN OF FINGER OF RIGHT HAND: Primary | ICD-10-CM

## 2019-01-03 DIAGNOSIS — M79.644 PAIN OF FINGER OF RIGHT HAND: ICD-10-CM

## 2019-01-03 ASSESSMENT — MIFFLIN-ST. JEOR: SCORE: 1632.72

## 2019-01-03 NOTE — PROGRESS NOTES
"Sports Medicine Clinic Visit    PCP: Sony Kidd CHRISTIANNE Shields is a 58 year old male who is seen  as self referral presenting with right little finger pain    Injury: trauma to bannister     Location of Pain: right little finger  Duration of Pain: 1 month(s)  Rating of Pain: 0/10  Pain is better with: Ice  Pain is worse with: AROM, TTP  Additional Features: Unemployed, he is a  returning in 3 months  Treatment so far consists of: Ice  Prior History of related problems: None     Resp 16   Ht 1.778 m (5' 10\")   Wt 81.6 kg (180 lb)   BMI 25.83 kg/m           PMH:  Past Medical History:   Diagnosis Date     Family history of colon cancer      Pulmonary emboli (H)      Pure hypercholesterolemia      Renal disease     kidney stones       Active problem list:  Patient Active Problem List   Diagnosis     Kidney stone     Colon polyp     Family history of colon cancer     Family history of prostate cancer     Advanced directives, counseling/discussion     Hyperlipidemia LDL goal <130     History of pulmonary embolism     Seborrheic keratosis     Freckled skin     Elevated blood pressure reading       FH:  Family History   Problem Relation Age of Onset     C.A.D. Mother         stents     Hypertension Mother      Cancer - colorectal Mother      Cancer Mother         skin cancer     Colon Cancer Mother      Cancer Father         bladder, metastatic to bone, passed 2002     Skin Cancer Father      Other Cancer Father         Bladder     Prostate Cancer Paternal Grandfather      Hypertension Brother      Diabetes No family hx of      Cerebrovascular Disease No family hx of      Breast Cancer No family hx of        SH:  Social History     Socioeconomic History     Marital status: Single     Spouse name: Not on file     Number of children: Not on file     Years of education: Not on file     Highest education level: Not on file   Social Needs     Financial resource strain: Not on file     Food insecurity - " worry: Not on file     Food insecurity - inability: Not on file     Transportation needs - medical: Not on file     Transportation needs - non-medical: Not on file   Occupational History     Not on file   Tobacco Use     Smoking status: Never Smoker     Smokeless tobacco: Never Used   Substance and Sexual Activity     Alcohol use: Yes     Comment: rare use     Drug use: No     Sexual activity: Yes     Partners: Female   Other Topics Concern     Parent/sibling w/ CABG, MI or angioplasty before 65F 55M? No   Social History Narrative     Not on file       MEDS:  See EMR, reviewed  ALL:  See EMR, reviewed    REVIEW OF SYSTEMS:  CONSTITUTIONAL:NEGATIVE for fever, chills, change in weight  INTEGUMENTARY/SKIN: NEGATIVE for worrisome rashes, moles or lesions  EYES: NEGATIVE for vision changes or irritation  ENT/MOUTH: NEGATIVE for ear, mouth and throat problems  RESP:NEGATIVE for significant cough or SOB  BREAST: NEGATIVE for masses, tenderness or discharge  CV: NEGATIVE for chest pain, palpitations or peripheral edema  GI: NEGATIVE for nausea, abdominal pain, heartburn, or change in bowel habits  :NEGATIVE for frequency, dysuria, or hematuria  :NEGATIVE for frequency, dysuria, or hematuria  NEURO: NEGATIVE for weakness, dizziness or paresthesias  ENDOCRINE: NEGATIVE for temperature intolerance, skin/hair changes  HEME/ALLERGY/IMMUNE: NEGATIVE for bleeding problems  PSYCHIATRIC: NEGATIVE for changes in mood or affect    Subjective: This 58-year-old  had a forced valgus stress placed on the PIP joint of the fifth finger of his right hand 3 weeks ago.  Is still sore although he has full range of motion of the digit.    Objective: He is tender directly at the radial aspect of the PIP joint.  Nontender at the ulnar aspect and nontender over the volar plate.  There are no signs of swan-neck deformity or boutonniere deformity.  No mallet finger deformity.  He has intact strength to independent flexion at the DIP PIP and  MCP and independent extension at the DIP PIP and MCP.  Gentle stress testing of the radial collateral ligament at the PIP joint causes mild discomfort and there is a slight laxity but an endpoint is noted.  He is nontender at the volar plate.  He can make a full fist with normal tracking of the finger.  Overlying skin is intact.  Sensation is normal capillary refill is normal.  Appropriate in conversation and affect.    An x-ray shows no fracture involving the fifth digit.    Plan: Right fifth finger sprain    Plan: He can lloyd tape as necessary for comfort and he was shown how to do this today.  He will look for continued improvements over the next 3 weeks.    This note was created with the use of Dragon software and unintentional spelling or errors may have occurred.

## 2019-01-03 NOTE — LETTER
"  1/3/2019      RE: Serjio Shields  [address] [place] [address]       Sports Medicine Clinic Visit    PCP: Sony Kidd    Serjio Shields is a 58 year old male who is seen  as self referral presenting with right little finger pain    Injury: trauma to bannister     Location of Pain: right little finger  Duration of Pain: 1 month(s)  Rating of Pain: 0/10  Pain is better with: Ice  Pain is worse with: AROM, TTP  Additional Features: Unemployed, he is a  returning in 3 months  Treatment so far consists of: Ice  Prior History of related problems: None     Resp 16   Ht 1.778 m (5' 10\")   Wt 81.6 kg (180 lb)   BMI 25.83 kg/m            PMH:  Past Medical History:   Diagnosis Date     Family history of colon cancer      Pulmonary emboli (H)      Pure hypercholesterolemia      Renal disease     kidney stones       Active problem list:  Patient Active Problem List   Diagnosis     Kidney stone     Colon polyp     Family history of colon cancer     Family history of prostate cancer     Advanced directives, counseling/discussion     Hyperlipidemia LDL goal <130     History of pulmonary embolism     Seborrheic keratosis     Freckled skin     Elevated blood pressure reading       FH:  Family History   Problem Relation Age of Onset     C.A.D. Mother         stents     Hypertension Mother      Cancer - colorectal Mother      Cancer Mother         skin cancer     Colon Cancer Mother      Cancer Father         bladder, metastatic to bone, passed 2002     Skin Cancer Father      Other Cancer Father         Bladder     Prostate Cancer Paternal Grandfather      Hypertension Brother      Diabetes No family hx of      Cerebrovascular Disease No family hx of      Breast Cancer No family hx of        SH:  Social History     Socioeconomic History     Marital status: Single     Spouse name: Not on file     Number of children: Not on file     Years of education: Not on file     Highest education level: " Not on file   Social Needs     Financial resource strain: Not on file     Food insecurity - worry: Not on file     Food insecurity - inability: Not on file     Transportation needs - medical: Not on file     Transportation needs - non-medical: Not on file   Occupational History     Not on file   Tobacco Use     Smoking status: Never Smoker     Smokeless tobacco: Never Used   Substance and Sexual Activity     Alcohol use: Yes     Comment: rare use     Drug use: No     Sexual activity: Yes     Partners: Female   Other Topics Concern     Parent/sibling w/ CABG, MI or angioplasty before 65F 55M? No   Social History Narrative     Not on file       MEDS:  See EMR, reviewed  ALL:  See EMR, reviewed    REVIEW OF SYSTEMS:  CONSTITUTIONAL:NEGATIVE for fever, chills, change in weight  INTEGUMENTARY/SKIN: NEGATIVE for worrisome rashes, moles or lesions  EYES: NEGATIVE for vision changes or irritation  ENT/MOUTH: NEGATIVE for ear, mouth and throat problems  RESP:NEGATIVE for significant cough or SOB  BREAST: NEGATIVE for masses, tenderness or discharge  CV: NEGATIVE for chest pain, palpitations or peripheral edema  GI: NEGATIVE for nausea, abdominal pain, heartburn, or change in bowel habits  :NEGATIVE for frequency, dysuria, or hematuria  :NEGATIVE for frequency, dysuria, or hematuria  NEURO: NEGATIVE for weakness, dizziness or paresthesias  ENDOCRINE: NEGATIVE for temperature intolerance, skin/hair changes  HEME/ALLERGY/IMMUNE: NEGATIVE for bleeding problems  PSYCHIATRIC: NEGATIVE for changes in mood or affect    Subjective: This 58-year-old  had a forced valgus stress placed on the PIP joint of the fifth finger of his right hand 3 weeks ago.  Is still sore although he has full range of motion of the digit.    Objective: He is tender directly at the radial aspect of the PIP joint.  Nontender at the ulnar aspect and nontender over the volar plate.  There are no signs of swan-neck deformity or boutonniere deformity.  No  mallet finger deformity.  He has intact strength to independent flexion at the DIP PIP and MCP and independent extension at the DIP PIP and MCP.  Gentle stress testing of the radial collateral ligament at the PIP joint causes mild discomfort and there is a slight laxity but an endpoint is noted.  He is nontender at the volar plate.  He can make a full fist with normal tracking of the finger.  Overlying skin is intact.  Sensation is normal capillary refill is normal.  Appropriate in conversation and affect.    An x-ray shows no fracture involving the fifth digit.    Plan: Right fifth finger sprain    Plan: He can lloyd tape as necessary for comfort and he was shown how to do this today.  He will look for continued improvements over the next 3 weeks.    This note was created with the use of Dragon software and unintentional spelling or errors may have occurred.      Pawel Grove MD

## 2019-04-19 ENCOUNTER — OFFICE VISIT (OUTPATIENT)
Dept: DERMATOLOGY | Facility: CLINIC | Age: 59
End: 2019-04-19
Payer: COMMERCIAL

## 2019-04-19 DIAGNOSIS — I78.1 NON-NEOPLASTIC NEVUS OF SKIN: ICD-10-CM

## 2019-04-19 DIAGNOSIS — L82.1 SEBORRHEIC KERATOSIS: ICD-10-CM

## 2019-04-19 DIAGNOSIS — L30.0 NUMMULAR ECZEMA: Primary | ICD-10-CM

## 2019-04-19 RX ORDER — CEPHALEXIN 500 MG/1
500 CAPSULE ORAL 2 TIMES DAILY
Qty: 14 CAPSULE | Refills: 0 | Status: SHIPPED | OUTPATIENT
Start: 2019-04-19 | End: 2019-04-19

## 2019-04-19 RX ORDER — TRIAMCINOLONE ACETONIDE 1 MG/G
OINTMENT TOPICAL 2 TIMES DAILY
Qty: 80 G | Refills: 0 | Status: SHIPPED | OUTPATIENT
Start: 2019-04-19 | End: 2020-01-16

## 2019-04-19 ASSESSMENT — PAIN SCALES - GENERAL: PAINLEVEL: NO PAIN (0)

## 2019-04-19 NOTE — PROGRESS NOTES
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         History of Present Illness:  Mr. Serjio Shields is a 58 year old male who presents as a follow-up for a skin check. No other new, pruritic, painful, or bleeding spots.     He does however complain of very itchy round scaly patches on bilateral calves for the last several months.  These are improved with hydrocortisone.  He also tried clotrimazole which was not helpful.    He wears sunscreen when outdoors as well as a hat. He avoids going out in the sun as he is so fair skinned, but wears sun protection when he is outdoors. No other concerns today.     Past Medical History:   Patient Active Problem List   Diagnosis     Kidney stone     Colon polyp     Family history of colon cancer     Family history of prostate cancer     Advanced directives, counseling/discussion     Hyperlipidemia LDL goal <130     History of pulmonary embolism     Seborrheic keratosis     Freckled skin     Elevated blood pressure reading     Past Medical History:   Diagnosis Date     Family history of colon cancer      Pulmonary emboli (H)      Pure hypercholesterolemia      Renal disease     kidney stones     Past Surgical History:   Procedure Laterality Date     COLONOSCOPY  1/16     CYSTOSCOPY, URETEROSCOPY, COMBINED Left 6/24/2015    Procedure: COMBINED CYSTOSCOPY, URETEROSCOPY;  Surgeon: Larry Elias MD;  Location: [facility] OR     SURGICAL HISTORY OF -       meatus dilation in childhood     Family History:  Numerous skin cancers in father, pt is not sure what types.     Medications:  Current Outpatient Medications    Medication Sig Dispense Refill     aspirin 81 MG tablet Take 1 tablet (81 mg) by mouth daily 30 tablet      Cholecalciferol (VITAMIN D3 PO) Take 1 tablet by mouth daily.       clotrimazole (LOTRIMIN) 1 % cream Apply topically 2 times daily 30 g 3     Cyanocobalamin (VITAMIN B 12 PO) Take 1 tablet by mouth daily        hydrochlorothiazide (HYDRODIURIL) 50 MG tablet Take 1 tablet (50 mg) by mouth daily 90 tablet 3     simvastatin (ZOCOR) 40 MG tablet TAKE 1 TABLET (40 MG) BY MOUTH AT BEDTIME 90 tablet 3     Allergies   Allergen Reactions     Penicillins Unknown     Unsure of exact reaction, had it as an infant         Review of Systems:  -As per HPI  -Constitutional: The patient denies fatigue, fevers, chills, unintended weight loss, and night sweats.  -Skin: As above in HPI. No additional skin concerns.    Physical exam:  Vitals: There were no vitals taken for this visit.  GEN: This is a well developed, well-nourished male in no acute distress, in a pleasant mood.    SKIN: Waist-up skin, which includes the head/face, neck, both arms, chest, back, abdomen, digits and/or nails was examined.   -Diffuse freckling on trunk and extremities   -waxy stuck-on brown papules on trunk  -few 2-4mm brown macules on trunk    -No other lesions of concern on areas examined.     Impression/Plan:  1. H/o AKs--well-controlled with field therapy.  2. SKs, nevi--benign  3. Nummular eczema--new diagnosis, inadequately treated.    - triamcinolone 0.1% ointment  - moisturization for prevention      Follow-up in 1 year, earlier for new or changing lesions.     Ronald Miguel MD  PGY-3 Dermatology  (p) [phone]    The Patient was seen in Dermatology Clinic by KAILEY TREVINO.  I performed the history, examination, and procedures noted above with the resident.  I have reviewed the history & exam as outlined in this note and made edits where appropriate. The assessment and plan were jointly made.    Kailey Centeno  MD Michelle, PhD    Dermatology

## 2019-04-19 NOTE — LETTER
4/19/2019       RE[facility] Serjio Shields  [address] [facility] [facility] [address]     Dear Colleague,    Thank you for referring your patient, Serjio Shields, to the [facility] DERMATOLOGY at [facility]. Please see a copy of my visit note below.    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         History of Present Illness:  Mr. Serjio Shields is a 58 year old male who presents as a follow-up for a skin check. No other new, pruritic, painful, or bleeding spots.     He does however complain of very itchy round scaly patches on bilateral calves for the last several months.  These are improved with hydrocortisone.  He also tried clotrimazole which was not helpful.    He wears sunscreen when outdoors as well as a hat. He avoids going out in the sun as he is so fair skinned, but wears sun protection when he is outdoors. No other concerns today.     Past Medical History:   Patient Active Problem List   Diagnosis     Kidney stone     Colon polyp     Family history of colon cancer     Family history of prostate cancer     Advanced directives, counseling/discussion     Hyperlipidemia LDL goal <130     History of pulmonary embolism     Seborrheic keratosis     Freckled skin     Elevated blood pressure reading     Past Medical History:   Diagnosis Date     Family history of colon cancer      Pulmonary emboli (H)      Pure hypercholesterolemia      Renal disease     kidney stones     Past Surgical History:   Procedure Laterality Date     COLONOSCOPY  1/16     CYSTOSCOPY, URETEROSCOPY, COMBINED Left 6/24/2015     Procedure: COMBINED CYSTOSCOPY, URETEROSCOPY;  Surgeon: Larry Elias MD;  Location: [facility]     SURGICAL HISTORY OF -       meatus dilation in childhood     Family History:  Numerous skin cancers in father, pt is not sure what types.     Medications:  Current Outpatient Medications   Medication Sig Dispense Refill     aspirin 81 MG tablet Take 1 tablet (81 mg) by mouth daily 30 tablet      Cholecalciferol (VITAMIN D3 PO) Take 1 tablet by mouth daily.       clotrimazole (LOTRIMIN) 1 % cream Apply topically 2 times daily 30 g 3     Cyanocobalamin (VITAMIN B 12 PO) Take 1 tablet by mouth daily        hydrochlorothiazide (HYDRODIURIL) 50 MG tablet Take 1 tablet (50 mg) by mouth daily 90 tablet 3     simvastatin (ZOCOR) 40 MG tablet TAKE 1 TABLET (40 MG) BY MOUTH AT BEDTIME 90 tablet 3     Allergies   Allergen Reactions     Penicillins Unknown     Unsure of exact reaction, had it as an infant         Review of Systems:  -As per HPI  -Constitutional: The patient denies fatigue, fevers, chills, unintended weight loss, and night sweats.  -Skin: As above in HPI. No additional skin concerns.    Physical exam:  Vitals: There were no vitals taken for this visit.  GEN: This is a well developed, well-nourished male in no acute distress, in a pleasant mood.    SKIN: Waist-up skin, which includes the head/face, neck, both arms, chest, back, abdomen, digits and/or nails was examined.   -Diffuse freckling on trunk and extremities   -waxy stuck-on brown papules on trunk  -few 2-4mm brown macules on trunk    -No other lesions of concern on areas examined.     Impression/Plan:  1. H/o AKs--well-controlled with field therapy.  2. SKs, nevi--benign  3. Nummular eczema--new diagnosis, inadequately treated.    - triamcinolone 0.1% ointment  - moisturization for prevention      Follow-up in 1 year, earlier for new or changing lesions.     Ronald Miguel MD  PGY-3 Dermatology  (p) [phone]    The Patient was seen in Dermatology  Clinic by KAILEY MARTINEZ.  I performed the history, examination, and procedures noted above with the resident.  I have reviewed the history & exam as outlined in this note and made edits where appropriate. The assessment and plan were jointly made.    Kailey Martinez MD, PhD    Dermatology

## 2019-04-19 NOTE — PATIENT INSTRUCTIONS
Backs of legs--eczema    Use triamcinolone ointment until even several days after the spots are resolved.    Keep moisturized and this will prevent this from returning.

## 2019-04-19 NOTE — NURSING NOTE
Dermatology Rooming Note    Serjio Shields's goals for this visit include:   Chief Complaint   Patient presents with     Skin Check     Skin check, Chema states he has several spots that itch.     Rosa Chanel LPN

## 2019-10-18 DIAGNOSIS — R82.994 HYPERCALCIURIA: ICD-10-CM

## 2019-10-18 DIAGNOSIS — E78.5 HYPERLIPIDEMIA LDL GOAL <160: ICD-10-CM

## 2019-10-18 NOTE — TELEPHONE ENCOUNTER
"[facility] PHARMACY COMMENTS:  Patient requests new RX     Requested Prescriptions   Pending Prescriptions Disp Refills     simvastatin (ZOCOR) 40 MG tablet  Last Written Prescription Date:  11/13/2018  Last Fill Quantity: 90 tablet,  # refills: 3   Last office visit: 11/13/2018 with prescribing provider:  ALIN Kidd   Future Office Visit:   90 tablet 3     Sig: TAKE 1 TABLET (40 MG) BY MOUTH AT BEDTIME       Statins Protocol Failed - 10/18/2019  3:32 PM        Failed - LDL on file in past 12 months     Recent Labs   Lab Test 06/25/18  0750   LDL 95             Passed - No abnormal creatine kinase in past 12 months     No lab results found.             Passed - Recent (12 mo) or future (30 days) visit within the authorizing provider's specialty     Patient has had an office visit with the authorizing provider or a provider within the authorizing providers department within the previous 12 mos or has a future within next 30 days. See \"Patient Info\" tab in inbasket, or \"Choose Columns\" in Meds & Orders section of the refill encounter.              Passed - Medication is active on med list        Passed - Patient is age 18 or older        "

## 2019-10-21 NOTE — TELEPHONE ENCOUNTER
Routing to TC.      Please assist patient in scheduling a follow up appointment. Then route to provider to consider refill request.  Failed RN refill protocol/please see below.    Leticia Marshall RN

## 2019-10-23 RX ORDER — SIMVASTATIN 40 MG
TABLET ORAL
Qty: 90 TABLET | Refills: 0 | Status: SHIPPED | OUTPATIENT
Start: 2019-10-23 | End: 2019-12-09

## 2019-10-23 RX ORDER — HYDROCHLOROTHIAZIDE 50 MG/1
50 TABLET ORAL DAILY
Qty: 90 TABLET | Refills: 0 | Status: SHIPPED | OUTPATIENT
Start: 2019-10-23 | End: 2019-12-09

## 2019-10-23 NOTE — TELEPHONE ENCOUNTER
[facility] is calling to check the status of the refill request.  Please call them at [phone] with refill authorization.    Angela Anderson  Patient Representative

## 2019-10-23 NOTE — TELEPHONE ENCOUNTER
Spoke to patient and scheduled an appointment with PCP mid Dec. Patient unable to come in prior to that because he is going out of the [place].     Routing to covering providers to consider refills.    Thank you,  Tisha GREEN  [facility]  Team Jenny Coordinator

## 2019-10-30 DIAGNOSIS — E78.5 HYPERLIPIDEMIA LDL GOAL <160: ICD-10-CM

## 2019-10-30 RX ORDER — SIMVASTATIN 40 MG
TABLET ORAL
Qty: 90 TABLET | Refills: 3 | OUTPATIENT
Start: 2019-10-30

## 2019-10-30 NOTE — TELEPHONE ENCOUNTER
"Requested Prescriptions   Pending Prescriptions Disp Refills     simvastatin (ZOCOR) 40 MG tablet [Pharmacy Med Name: SIMVASTATIN 40 MG TABLET]  Last Written Prescription Date:  10/23/2019  Last Fill Quantity: 90 tabs,  # refills: 0   Last office visit: 11/13/2018 with prescribing provider:  Edgar   Future Office Visit:   Next 5 appointments (look out 90 days)    Dec 09, 2019 11:20 AM CST  SHORT with Sony Kidd MD  [facility] [address] [place] [id number] [phone]        90 tablet 3     Sig: TAKE 1 TABLET (40 MG) BY MOUTH AT BEDTIME       Statins Protocol Failed - 10/30/2019  1:45 AM        Failed - LDL on file in past 12 months     Recent Labs   Lab Test 06/25/18  0750   LDL 95             Passed - No abnormal creatine kinase in past 12 months     No lab results found.             Passed - Recent (12 mo) or future (30 days) visit within the authorizing provider's specialty     Patient has had an office visit with the authorizing provider or a provider within the authorizing providers department within the previous 12 mos or has a future within next 30 days. See \"Patient Info\" tab in inbasket, or \"Choose Columns\" in Meds & Orders section of the refill encounter.              Passed - Medication is active on med list        Passed - Patient is age 18 or older         "

## 2019-10-30 NOTE — TELEPHONE ENCOUNTER
Medication refilled 10/23/19 at alternative pharmacy.     Refused.     Socorro Garcia, RN, BSN, PHN  [facility]: [place]

## 2019-11-05 ENCOUNTER — HEALTH MAINTENANCE LETTER (OUTPATIENT)
Age: 59
End: 2019-11-05

## 2019-12-09 ENCOUNTER — OFFICE VISIT (OUTPATIENT)
Dept: FAMILY MEDICINE | Facility: CLINIC | Age: 59
End: 2019-12-09
Payer: COMMERCIAL

## 2019-12-09 VITALS
DIASTOLIC BLOOD PRESSURE: 78 MMHG | WEIGHT: 173 LBS | SYSTOLIC BLOOD PRESSURE: 132 MMHG | HEART RATE: 72 BPM | TEMPERATURE: 97.8 F | HEIGHT: 70 IN | BODY MASS INDEX: 24.77 KG/M2

## 2019-12-09 DIAGNOSIS — E78.5 HYPERLIPIDEMIA LDL GOAL <130: ICD-10-CM

## 2019-12-09 DIAGNOSIS — R82.994 HYPERCALCIURIA: ICD-10-CM

## 2019-12-09 DIAGNOSIS — Z12.5 SPECIAL SCREENING FOR MALIGNANT NEOPLASM OF PROSTATE: ICD-10-CM

## 2019-12-09 DIAGNOSIS — Z11.4 ENCOUNTER FOR SCREENING FOR HIV: ICD-10-CM

## 2019-12-09 DIAGNOSIS — R05.9 COUGH: ICD-10-CM

## 2019-12-09 DIAGNOSIS — R03.0 ELEVATED BLOOD PRESSURE READING: Primary | ICD-10-CM

## 2019-12-09 PROCEDURE — 99214 OFFICE O/P EST MOD 30 MIN: CPT | Performed by: FAMILY MEDICINE

## 2019-12-09 PROCEDURE — 80061 LIPID PANEL: CPT | Performed by: FAMILY MEDICINE

## 2019-12-09 PROCEDURE — 87389 HIV-1 AG W/HIV-1&-2 AB AG IA: CPT | Performed by: FAMILY MEDICINE

## 2019-12-09 PROCEDURE — G0103 PSA SCREENING: HCPCS | Performed by: FAMILY MEDICINE

## 2019-12-09 PROCEDURE — 80048 BASIC METABOLIC PNL TOTAL CA: CPT | Performed by: FAMILY MEDICINE

## 2019-12-09 PROCEDURE — 36415 COLL VENOUS BLD VENIPUNCTURE: CPT | Performed by: FAMILY MEDICINE

## 2019-12-09 RX ORDER — SIMVASTATIN 40 MG
TABLET ORAL
Qty: 90 TABLET | Refills: 3 | Status: SHIPPED | OUTPATIENT
Start: 2019-12-09 | End: 2020-11-23

## 2019-12-09 RX ORDER — HYDROCHLOROTHIAZIDE 50 MG/1
50 TABLET ORAL DAILY
Qty: 90 TABLET | Refills: 3 | Status: SHIPPED | OUTPATIENT
Start: 2019-12-09 | End: 2020-11-23

## 2019-12-09 ASSESSMENT — MIFFLIN-ST. JEOR: SCORE: 1595.97

## 2019-12-09 NOTE — PROGRESS NOTES
Subjective     Serjio Shields is a 59 year old male who presents to clinic today for the following health issues:    HPI   Hyperlipidemia Follow-Up      Are you regularly taking any medication or supplement to lower your cholesterol?   Yes- Simvastatin     Are you having muscle aches or other side effects that you think could be caused by your cholesterol lowering medication?  No    Elevated Blood Pressure      Do you check your blood pressure regularly outside of the clinic? No     Are you following a low salt diet? Yes    Are your blood pressures ever more than 140 on the top number (systolic) OR more   than 90 on the bottom number (diastolic), for example 140/90?       How many servings of fruits and vegetables do you eat daily?  4 or more    On average, how many sweetened beverages do you drink each day (Examples: soda, juice, sweet tea, etc.  Do NOT count diet or artificially sweetened beverages)?   1    How many days per week do you miss taking your medication? 0    Taking hydrochlorothiazide and this was prescribed for his history of nephrolithiasis.     Weight Loss  Reports that with his new change in job as a  he has been eating a little less and attributes his recent decrease in weight to this.   Wt Readings from Last 5 Encounters:   12/09/19 78.5 kg (173 lb)   01/03/19 81.6 kg (180 lb)   11/13/18 78.9 kg (174 lb)   06/12/18 77.1 kg (170 lb)   10/17/17 79.4 kg (175 lb)     Cough   Has cough which he notes if often associated with a postnasal drip. Uses a neti pot and gets some relief. Has been using allergy med which gives questionable relief.     BP Readings from Last 3 Encounters:   12/09/19 132/78   11/13/18 128/74   06/12/18 135/75    Wt Readings from Last 3 Encounters:   12/09/19 78.5 kg (173 lb)   01/03/19 81.6 kg (180 lb)   11/13/18 78.9 kg (174 lb)           Reviewed and updated as needed this visit by Provider       Review of Systems   ROS COMP: Constitutional, HEENT, cardiovascular,  "pulmonary, gi and gu systems are negative, except as otherwise noted.    This document serves as a record of the services and decisions personally performed and made by Virgilio Kidd MD. It was created on his behalf by Red Martinez, a trained medical scribe. The creation of this document is based on the provider's statements to the medical scribe.  Red Martinez 11:50 AM December 9, 2019      Objective    /78   Pulse 72   Temp 97.8  F (36.6  C) (Oral)   Ht 1.778 m (5' 10\")   Wt 78.5 kg (173 lb)   BMI 24.82 kg/m    Body mass index is 24.82 kg/m .  Physical Exam   GENERAL: healthy, alert and no distress  HENT: ear canals and TM's normal, nose and mouth without ulcers or lesions, no tenderness at the sinuses   RESP: lungs clear to auscultation - no rales, rhonchi or wheezes  CV: regular rate and rhythm, normal S1 S2, no S3 or S4, no murmur, click or rub, no peripheral edema and peripheral pulses strong  SKIN: no suspicious lesions or rashes  NEURO: Normal strength and tone, mentation intact and speech normal  PSYCH: mentation appears normal, affect normal/bright        Assessment & Plan       ICD-10-CM    1. Elevated blood pressure reading R03.0 BASIC METABOLIC PANEL   2. Hyperlipidemia LDL goal <130 E78.5 simvastatin (ZOCOR) 40 MG tablet     Lipid panel reflex to direct LDL Fasting   3. Hypercalciuria R82.994 hydrochlorothiazide (HYDRODIURIL) 50 MG tablet   4. Special screening for malignant neoplasm of prostate Z12.5 PROSTATE SPEC ANTIGEN SCREEN   5. Encounter for screening for HIV Z11.4 HIV Screening   6. Cough R05    Blood pressure was within normal limits after recheck. Seems that his hydrochlorothiazide is keeping blood pressure in check although not prescribed for Hypertension but rather to prevent recurrent nephrolithiasis.   Cough probable secondary to congestion.. ok to use Mucinex or loratidine     Patient Instructions   Come back to see me in the spring or summer. Certainly wait until after the " flu season if not having acute issues.    Guaifenesin aka mucinex should be worth a try and is safe option for flying. Another thing you might try is Flonase (fluticasone) nasal spray. These can both be purchased over the counter.       Return in about 6 months (around 6/9/2020).    The information in this document, created by the medical scribe for me, accurately reflects the services I personally performed and the decisions made by me. I have reviewed and approved this document for accuracy prior to leaving the patient care area.  December 9, 2019 12:04 PM    Sony Kidd MD, MD  [facility]

## 2019-12-09 NOTE — PATIENT INSTRUCTIONS
Come back to see me in the spring or summer. Certainly wait until after the flu season if not having acute issues.    Guaifenesin aka mucinex should be worth a try and is safe option for flying. Another thing you might try is Flonase (fluticasone) nasal spray. These can both be purchased over the counter.

## 2019-12-10 ENCOUNTER — MYC MEDICAL ADVICE (OUTPATIENT)
Dept: FAMILY MEDICINE | Facility: CLINIC | Age: 59
End: 2019-12-10

## 2019-12-10 LAB
ANION GAP SERPL CALCULATED.3IONS-SCNC: 8 MMOL/L (ref 3–14)
BUN SERPL-MCNC: 13 MG/DL (ref 7–30)
CALCIUM SERPL-MCNC: 9.3 MG/DL (ref 8.5–10.1)
CHLORIDE SERPL-SCNC: 105 MMOL/L (ref 94–109)
CHOLEST SERPL-MCNC: 182 MG/DL
CO2 SERPL-SCNC: 27 MMOL/L (ref 20–32)
CREAT SERPL-MCNC: 0.84 MG/DL (ref 0.66–1.25)
GFR SERPL CREATININE-BSD FRML MDRD: >90 ML/MIN/{1.73_M2}
GLUCOSE SERPL-MCNC: 88 MG/DL (ref 70–99)
HDLC SERPL-MCNC: 53 MG/DL
HIV 1+2 AB+HIV1 P24 AG SERPL QL IA: NONREACTIVE
LDLC SERPL CALC-MCNC: 97 MG/DL
NONHDLC SERPL-MCNC: 129 MG/DL
POTASSIUM SERPL-SCNC: 4.1 MMOL/L (ref 3.4–5.3)
PSA SERPL-ACNC: 1.47 UG/L (ref 0–4)
SODIUM SERPL-SCNC: 140 MMOL/L (ref 133–144)
TRIGL SERPL-MCNC: 158 MG/DL

## 2019-12-10 NOTE — TELEPHONE ENCOUNTER
Route to PCP to please advise.  See MyChart.   I was unable to find any documentation via [facility] regarding duration of use for Mucinex.    Neil Dias RN

## 2020-01-16 ENCOUNTER — OFFICE VISIT (OUTPATIENT)
Dept: FAMILY MEDICINE | Facility: CLINIC | Age: 60
End: 2020-01-16
Payer: COMMERCIAL

## 2020-01-16 VITALS — WEIGHT: 172.2 LBS | TEMPERATURE: 99.5 F | BODY MASS INDEX: 24.71 KG/M2

## 2020-01-16 DIAGNOSIS — N52.9 ERECTILE DYSFUNCTION, UNSPECIFIED ERECTILE DYSFUNCTION TYPE: Primary | ICD-10-CM

## 2020-01-16 PROCEDURE — 99213 OFFICE O/P EST LOW 20 MIN: CPT | Performed by: PHYSICIAN ASSISTANT

## 2020-01-16 RX ORDER — TADALAFIL 20 MG/1
20 TABLET ORAL DAILY PRN
Qty: 12 TABLET | Refills: 5 | Status: SHIPPED | OUTPATIENT
Start: 2020-01-16 | End: 2020-11-09

## 2020-01-16 NOTE — PROGRESS NOTES
Subjective     Serjio Shields is a 59 year old male who presents to clinic today for the following health issues:    HPI     Patient would like to discuss erectile dysfunction. Sex drive is good. Getting morning erections. Energy is good. Gets erections but they aren't as good and don't last long enough.    Denies other symptoms. No known cardiovascular disease.    Patient Active Problem List   Diagnosis     Kidney stone     Colon polyp     Family history of colon cancer     Family history of prostate cancer     Advanced directives, counseling/discussion     Hyperlipidemia LDL goal <130     History of pulmonary embolism     Seborrheic keratosis     Freckled skin     Elevated blood pressure reading      Current Outpatient Medications   Medication     Cyanocobalamin (VITAMIN B 12 PO)     hydrochlorothiazide (HYDRODIURIL) 50 MG tablet     simvastatin (ZOCOR) 40 MG tablet     tadalafil (CIALIS) 20 MG tablet     No current facility-administered medications for this visit.         Patient Active Problem List   Diagnosis     Kidney stone     Colon polyp     Family history of colon cancer     Family history of prostate cancer     Advanced directives, counseling/discussion     Hyperlipidemia LDL goal <130     History of pulmonary embolism     Seborrheic keratosis     Freckled skin     Elevated blood pressure reading     Past Surgical History:   Procedure Laterality Date     COLONOSCOPY  1/16     CYSTOSCOPY, URETEROSCOPY, COMBINED Left 6/24/2015    Procedure: COMBINED CYSTOSCOPY, URETEROSCOPY;  Surgeon: Larry Elias MD;  Location: [facility] OR     EYE SURGERY  6/90    RK on both eyes     SURGICAL HISTORY OF -       meatus dilation in childhood       Social History     Tobacco Use     Smoking status: Never Smoker     Smokeless tobacco: Never Used   Substance Use Topics     Alcohol use: Yes     Comment: rare use     Family History   Problem Relation Age of Onset     C.A.D. Mother         stents     Hypertension Mother       Cancer - colorectal Mother      Cancer Mother         skin cancer     Colon Cancer Mother      Cancer Father         bladder, metastatic to bone, passed 2002     Skin Cancer Father      Other Cancer Father         Bladder     Prostate Cancer Paternal Grandfather      Hypertension Brother      Diabetes No family hx of      Cerebrovascular Disease No family hx of      Breast Cancer No family hx of            Reviewed and updated as needed this visit by Provider         Review of Systems   ROS COMP: Constitutional, HEENT, cardiovascular, pulmonary, gi and gu systems are negative, except as otherwise noted.      Objective    Temp 99.5  F (37.5  C) (Oral)   Wt 78.1 kg (172 lb 3.2 oz)   BMI 24.71 kg/m    Body mass index is 24.71 kg/m .  Physical Exam   GENERAL: healthy, alert and no distress    Diagnostic Test Results:  Labs reviewed in Epic        Assessment & Plan     (N52.9) Erectile dysfunction, unspecified erectile dysfunction type  (primary encounter diagnosis)  Comment:   Plan: tadalafil (CIALIS) 20 MG tablet        He exercises regularly and already has a good diet. He doesn't drink a lot of alcohol. Can try medication. Consider L - Arginine as well.     No follow-ups on file.    FIDELINA SALMON PA-C  [facility]

## 2020-01-16 NOTE — PATIENT INSTRUCTIONS
1. GoodRX - Cialis and Viagra   2. L-Arginine - 2000 to 5000 mg daily - might help a bit - works when used regularly

## 2020-03-26 ENCOUNTER — VIRTUAL VISIT (OUTPATIENT)
Dept: FAMILY MEDICINE | Facility: OTHER | Age: 60
End: 2020-03-26

## 2020-03-26 ENCOUNTER — TELEPHONE (OUTPATIENT)
Dept: FAMILY MEDICINE | Facility: CLINIC | Age: 60
End: 2020-03-26

## 2020-03-26 NOTE — TELEPHONE ENCOUNTER
Reason for Call:  Other - COVID-19 Questions    Detailed comments: Patient called and stated he did an [facility] visit for his symptoms. At the end of the visit he was told that only patients in critical condition can be tested for COVID-19. Patient stated he is a  and the people he flies with need to know if he is positive for COVID-19. He is wondering what he is supposed to do next to get tested. Please call back.    Phone Number Patient can be reached at: Home number on file [phone] (home)    Best Time: Anytime    Can we leave a detailed message on this number? YES    Call taken on 3/26/2020 at 2:28 PM by Benita Valladares

## 2020-03-26 NOTE — TELEPHONE ENCOUNTER
Patient was wanting to know if there was another way he could be tested.  [facility]omed him test are limited to those that are critically ill.    Patient plans to continue his quarantine and will follow directive that [facility] gave him.    Janette Mai RN

## 2020-03-26 NOTE — PROGRESS NOTES
"Date: 2020 07:56:58  Clinician: Jennie Lloyd  Clinician NPI: [id number]  Patient: Serjio Shields  Patient : 1960  Patient Address: [address] [place] [address] [place] [address]  Patient Phone: [phone]  Visit Protocol: URI  Patient Summary:  Serjio is a 59 year old ( : 1960 ) male who initiated a Visit for COVID-19 (Coronavirus) evaluation and screening. When asked the question \"Please sign me up to receive news, health information and promotions. \", Serjio responded \"No\".    Serjio states his symptoms started 1-2 days ago.   His symptoms consist of rhinitis, a cough, nasal congestion, malaise, myalgia, and chills. Serjio also feels feverish.   Symptom details     Nasal secretions: The color of his mucus is clear.    Cough: Serjio coughs every 5-10 minutes and his cough is not more bothersome at night. Phlegm does not come into his throat when he coughs. He does not believe his cough is caused by post-nasal drip.     Temperature: His current temperature is 99 degrees Fahrenheit.      Serjio denies having ear pain, wheezing, sore throat, teeth pain, headache, and facial pain or pressure. He also denies taking antibiotic medication for the symptoms and having recent facial or sinus surgery in the past 60 days. He is not experiencing dyspnea.   Precipitating events  He has not recently been exposed to someone with influenza. Serjio has not been in close contact with any high risk individuals.   Pertinent COVID-19 (Coronavirus) information  Serjio has traveled internationally or to the areas where COVID-19 (Coronavirus) is widespread, including cruise ship travel in the last 14 days before the start of his symptoms. Countries or locations traveled as reported by the patient (free text): [place]   Serjio has not had a close contact with a laboratory-confirmed COVID-19 patient within 14 days of symptom onset. He also has not had a close contact with a suspected COVID-19 patient within 14 days of " symptom onset.   Serjio is not a healthcare worker or a  and does not work in a healthcare facility. He does not live with a healthcare worker.   Pertinent medical history  Serjio needs a return to work/school note.   Weight: 170 lbs   Serjio does not smoke or use smokeless tobacco.   Weight: 170 lbs    MEDICATIONS: amiloride-hydrochlorothiazide oral, simvastatin oral, ALLERGIES: Penicillins  Clinician Response:  Dear Serjio,      Based on the information you have provided, you do have symptoms that are consistent with Coronavirus (COVID-19).   The coronavirus causes mild to severe respiratory illness with the most common symptoms including fever, cough and difficulty breathing. Unfortunately, many viruses cause similar symptoms and it can be difficult to distinguish between viruses, especially in mild cases, so we are presuming that anyone with cough or fever has coronavirus at this time.  Coronavirus/COVID-19 has reached the point of community spread in [place], meaning that we are finding the virus in people with no known exposure risk for mira the virus. Given the increasing commonness of coronavirus in the community we are no longer testing patients who are not critically ill.  If you are a health care worker, you should refer to your employee health office for instructions about testing and returning to work.  For everyone else who has cough or fever, you should assume you are infected with coronavirus. Since you will not be tested but have symptoms that may be consistent with coronavirus, the CDC recommends you stay in self-isolation until these three things have happened:    You have had no fever for at least 72 hours (that is three full days of no fever without the use of medicine that reduces fevers)    AND   Other symptoms have improved (for example, when your cough or shortness of breath have improved)   AND   At least 7 days have passed since your symptoms first appeared.    How to Isolate:    Isolate yourself at home.   Do Not allow any visitors  Do Not go to work or school  Do Not go to [employer],  centers, shopping, or other public places.  Do Not shake hands.  Avoid close contact with others (hugging, kissing).   Protect Others:    Cover Your Mouth and Nose with a mask, disposable tissue or wash cloth to avoid spreading germs to others.  Wash your hands and face frequently with soap and water.   Managing Symptoms:    At this time, we primarily recommend Tylenol (Acetaminophen) for fever or pain. If you have liver or kidney problems, contact your primary care provider for instructions on use of tylenol. Adults can take 650 mg (two 325 mg pills) by mouth every 4-6 hours as needed OR 1,000 mg (two 500 mg pills) every 8 hours as needed. MAXIMUM DAILY DOSE: 3,000mg. For children, refer to dosing on bottle based on age or weight.   If you develop significant shortness of breath that prevents you from doing normal activities, please call 911 or proceed to the nearest emergency room and alert them immediately that you have been in self-isolation for possible coronavirus.   For more information about COVID19 and options for caring for yourself at home, please visit the CDC website at https://www.cdc.gov/coronavirus/2019-ncov/about/steps-when-sick.htmlFor more options for care at [facility], please visit our website at [email or web address]     Diagnosis: Cough  Diagnosis ICD: R05

## 2020-03-27 ENCOUNTER — MYC MEDICAL ADVICE (OUTPATIENT)
Dept: FAMILY MEDICINE | Facility: CLINIC | Age: 60
End: 2020-03-27

## 2020-03-27 NOTE — TELEPHONE ENCOUNTER
Patient mychart with concerns on new urinary symptoms. Advised to create telephone or [facility] visit.    Daisy Bourgeois RN on 3/27/2020 at 9:32 AM

## 2020-04-17 ENCOUNTER — MYC MEDICAL ADVICE (OUTPATIENT)
Dept: FAMILY MEDICINE | Facility: CLINIC | Age: 60
End: 2020-04-17

## 2020-05-10 ENCOUNTER — MYC MEDICAL ADVICE (OUTPATIENT)
Dept: FAMILY MEDICINE | Facility: CLINIC | Age: 60
End: 2020-05-10

## 2020-05-10 DIAGNOSIS — Z11.59 SCREENING FOR VIRAL DISEASE: Primary | ICD-10-CM

## 2020-05-11 NOTE — TELEPHONE ENCOUNTER
Routing request for COVID-19 antibody test to providers in PCP absence.  Would someone place an order, or is a virtual visit needed?   Per Arnold, patient is asymptomatic but thinks he may have had COVID about a month ago.    Kimber Cline RN

## 2020-05-20 DIAGNOSIS — Z11.59 SCREENING FOR VIRAL DISEASE: ICD-10-CM

## 2020-05-20 PROCEDURE — 99000 SPECIMEN HANDLING OFFICE-LAB: CPT | Performed by: FAMILY MEDICINE

## 2020-05-20 PROCEDURE — 86769 SARS-COV-2 COVID-19 ANTIBODY: CPT | Performed by: FAMILY MEDICINE

## 2020-05-20 PROCEDURE — 36415 COLL VENOUS BLD VENIPUNCTURE: CPT | Mod: 90 | Performed by: FAMILY MEDICINE

## 2020-05-22 LAB
COVID-19 SPIKE RBD ABY TITER: NORMAL
COVID-19 SPIKE RBD ABY: POSITIVE

## 2020-06-30 ENCOUNTER — TELEPHONE (OUTPATIENT)
Dept: OPHTHALMOLOGY | Facility: CLINIC | Age: 60
End: 2020-06-30

## 2020-06-30 NOTE — TELEPHONE ENCOUNTER
Writer was unable to speak with patient regarding symptoms. Since Dr. Elam's schedule is full and patient wants to be seen soon, the phone number for [facility] [phone] was left on voicemail as patient should be able to seen sooner in either [place] or [place].    Melanie Jeans, Ophthalmic Assistant  12:33 PM 06/30/2020

## 2020-06-30 NOTE — TELEPHONE ENCOUNTER
[facility] Call Center    Phone Message    May a detailed message be left on voicemail: no     Reason for Call: Symptoms or Concerns     If patient has red-flag symptoms, warm transfer to triage line    Current symptom or concern: Floaters. Pt is a  and needs to get this checked out sooner than August. Please call pt back.     Symptoms have been present for:  2 day(s)        Action Taken: Message routed to:  Adult Clinics: Eye p [phone]    Travel Screening:

## 2020-06-30 NOTE — TELEPHONE ENCOUNTER
Floaters started left eye (dark dot with mesh like floating around side of dot) two days ago. Floaters are always there. Not having any light flashes. Not having any loss of peripheral vision. Vision is doing well left eye. Patient is a  and it is very bothersome for him. Will put him on schedule at 9 am. OK'd over book per dr. Ponce

## 2020-07-03 ENCOUNTER — OFFICE VISIT (OUTPATIENT)
Dept: OPHTHALMOLOGY | Facility: CLINIC | Age: 60
End: 2020-07-03
Payer: COMMERCIAL

## 2020-07-03 DIAGNOSIS — H43.812 POSTERIOR VITREOUS DETACHMENT OF LEFT EYE: Primary | ICD-10-CM

## 2020-07-03 DIAGNOSIS — Z98.890 HISTORY OF RADIAL KERATOTOMY: ICD-10-CM

## 2020-07-03 PROCEDURE — 92004 COMPRE OPH EXAM NEW PT 1/>: CPT | Performed by: OPHTHALMOLOGY

## 2020-07-03 ASSESSMENT — CONF VISUAL FIELD
OS_NORMAL: 1
METHOD: COUNTING FINGERS

## 2020-07-03 ASSESSMENT — VISUAL ACUITY
OD_CC+: -1
OD_PH_CC: 20/20
OS_CC: 20/25
CORRECTION_TYPE: GLASSES
METHOD: SNELLEN - LINEAR
OD_CC: 20/40

## 2020-07-03 ASSESSMENT — SLIT LAMP EXAM - LIDS
COMMENTS: NORMAL
COMMENTS: NORMAL

## 2020-07-03 ASSESSMENT — TONOMETRY
OS_IOP_MMHG: 17
IOP_METHOD: APPLANATION
OD_IOP_MMHG: 17

## 2020-07-03 ASSESSMENT — EXTERNAL EXAM - LEFT EYE: OS_EXAM: NORMAL

## 2020-07-03 ASSESSMENT — EXTERNAL EXAM - RIGHT EYE: OD_EXAM: NORMAL

## 2020-07-03 ASSESSMENT — CUP TO DISC RATIO: OS_RATIO: 0.3

## 2020-07-03 NOTE — LETTER
7/3/2020         RE: Serjio Shields  [address] [address]  [place] [address]        Dear Colleague,    Thank you for referring your patient, Serjio Shields, to the [facility]. Please see a copy of my visit note below.     Current Eye Medications:  nond     Subjective:  Black and transparent mesh like floater left eye for last 4-5 days. Not having any light flashes or curtain effect either eye. Vision is fine right eye. Vision is little cloudy for just 1 second left eye once in a while (like someone smeared Vaseline on glasses). Patient is a . Patient states tested positive for Antibody for covid.    Had RK in the 1990s.  ; [place].     Objective:  See Ophthalmology Exam.       Assessment:  Acute posterior vitreous detachment left eye.      Plan:  You have a posterior vitreous detachment in the left eye.  Signs and symptoms of retinal detachment (shower of black floaters, frequent flashing even during day, curtain over part of visual field) discussed.  Possible posterior vitreous detachment (sudden onset large floater and/or flashing lights) right eye discussed.  Return visit as needed.  Edwin Ponce M.D.  [phone]           Again, thank you for allowing me to participate in the care of your patient.        Sincerely,        Edwin Ponce MD

## 2020-07-03 NOTE — PATIENT INSTRUCTIONS
You have a posterior vitreous detachment in the left eye.  Signs and symptoms of retinal detachment (shower of black floaters, frequent flashing even during day, curtain over part of visual field) discussed.  Possible posterior vitreous detachment (sudden onset large floater and/or flashing lights) right eye discussed.  Return visit as needed.  Edwin Ponce M.D.  [phone]

## 2020-07-03 NOTE — PROGRESS NOTES
Current Eye Medications:  nond     Subjective:  Black and transparent mesh like floater left eye for last 4-5 days. Not having any light flashes or curtain effect either eye. Vision is fine right eye. Vision is little cloudy for just 1 second left eye once in a while (like someone smeared Vaseline on glasses). Patient is a . Patient states tested positive for Antibody for covid.    Had RK in the 1990s.  ; [place].     Objective:  See Ophthalmology Exam.       Assessment:  Acute posterior vitreous detachment left eye.      Plan:  You have a posterior vitreous detachment in the left eye.  Signs and symptoms of retinal detachment (shower of black floaters, frequent flashing even during day, curtain over part of visual field) discussed.  Possible posterior vitreous detachment (sudden onset large floater and/or flashing lights) right eye discussed.  Return visit as needed.  Edwin Ponce M.D.  [phone]

## 2020-11-09 ENCOUNTER — OFFICE VISIT (OUTPATIENT)
Dept: DERMATOLOGY | Facility: CLINIC | Age: 60
End: 2020-11-09
Payer: COMMERCIAL

## 2020-11-09 DIAGNOSIS — L57.0 AK (ACTINIC KERATOSIS): ICD-10-CM

## 2020-11-09 DIAGNOSIS — L81.4 LENTIGO: Primary | ICD-10-CM

## 2020-11-09 DIAGNOSIS — Z12.83 SKIN CANCER SCREENING: ICD-10-CM

## 2020-11-09 DIAGNOSIS — L71.9 ROSACEA: ICD-10-CM

## 2020-11-09 PROCEDURE — 17003 DESTRUCT PREMALG LES 2-14: CPT | Performed by: PHYSICIAN ASSISTANT

## 2020-11-09 PROCEDURE — 99213 OFFICE O/P EST LOW 20 MIN: CPT | Mod: 25 | Performed by: PHYSICIAN ASSISTANT

## 2020-11-09 PROCEDURE — 17000 DESTRUCT PREMALG LESION: CPT | Performed by: PHYSICIAN ASSISTANT

## 2020-11-09 ASSESSMENT — PAIN SCALES - GENERAL: PAINLEVEL: NO PAIN (0)

## 2020-11-09 NOTE — NURSING NOTE
Dermatology Rooming Note    Serjio Shields's goals for this visit include:   Chief Complaint   Patient presents with     Skin Check     Father had skin cancer. No areas of concern today.     Wilda Stockton, CMA

## 2020-11-09 NOTE — LETTER
11/9/2020       RE: Serjio Shields  [id number]  [place] [address]     Dear Colleague,    Thank you for referring your patient, Serjio Shields, to the [facility] DERMATOLOGY CLINIC [place] at [facility] Please see a copy of my visit note below.    [facility] Dermatology Note    Dermatology Problem List:  1. Actinic keratosis  -Field therapy with dovonex/efudex April 2018  -S/p cryotherapy to lesion on scalp April 2018  2. 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 Nummular Eczema - occasional use of OTC hydrocortisone, emollients    CC:   Chief Complaint   Patient presents with     Skin Check     Father had skin cancer. No areas of concern today.     Encounter Date: Nov 9, 2020    History of Present Illness:  Mr. Serjio Shields is a 60 year old male who returns today for a waist up skin exam. No hx of skin cancer. Hx of AKs. Patient is a . He has no specific spots of concern today. He notes some dark spots on the face and some underlying redness which has seemed to get progressively worse over the years, wondering about treatments for this. He is otherwise well and has no other concerns.     Past Medical History:   Patient Active Problem List   Diagnosis     Kidney stone     Colon polyp     Family history of colon cancer     Family history of prostate cancer     Advanced directives, counseling/discussion     Hyperlipidemia LDL goal <130     History of pulmonary embolism     Seborrheic keratosis     Freckled skin     Elevated blood pressure reading     Posterior vitreous detachment of left eye     History of radial keratotomy     Past Medical History:   Diagnosis Date     Family history of colon cancer      Family history of colon cancer      Pulmonary emboli (H)      Pure hypercholesterolemia      Renal disease     kidney stones     Past Surgical History:   Procedure Laterality Date     COLONOSCOPY  1/16      CYSTOSCOPY, URETEROSCOPY, COMBINED Left 6/24/2015    Procedure: COMBINED CYSTOSCOPY, URETEROSCOPY;  Surgeon: Larry Elias MD;  Location: [facility] OR     EYE SURGERY  6/90    RK on both eyes     SURGICAL HISTORY OF -       meatus dilation in childhood       Social History:  Patient is a , wears sun protection, especially when flying    Family History:  Numerous skin cancers in father, pt is not sure what types.     Medications:  Current Outpatient Medications   Medication Sig Dispense Refill     Cyanocobalamin (VITAMIN B 12 PO) Take 1 tablet by mouth daily        hydrochlorothiazide (HYDRODIURIL) 50 MG tablet Take 1 tablet (50 mg) by mouth daily 90 tablet 3     simvastatin (ZOCOR) 40 MG tablet TAKE 1 TABLET (40 MG) BY MOUTH AT BEDTIME 90 tablet 3     Allergies   Allergen Reactions     Penicillins Unknown     Unsure of exact reaction, had it as an infant     Review of Systems:  -Constitutional: The patient denies fatigue, fevers, chills, unintended weight loss, and night sweats.  -HEENT: Patient denies nonhealing oral sores.  -Skin: As above in HPI. No additional skin concerns.    Physical exam:  Vitals: There were no vitals taken for this visit.  GEN: This is a well developed, well-nourished male in no acute distress, in a pleasant mood.    SKIN: Waist-up skin, which includes the head/face, neck, both arms, chest, back, abdomen, digits and/or nails was examined.  -There are erythematous macules with overyling adherent scale on the vertex scalp, L arm and L lateral forehead.   -Scattered brown macules on sun exposed areas.  -Scattered telangiectasias over the face  -Infante's skin type I  -No other lesions of concern on areas examined.     Impression/Plan:  1. Lentigines/telagiectasais and mild rosacea -face  -discussed etiology  -refer to cosmetic derm for PDL consult    2. Skin Cancer Screening  -ABCDs of melanoma were discussed and self skin checks were advised.  - Sun precaution was advised  including the use of sun screens of SPF 30 or higher, sun protective clothing, and avoidance of tanning beds.  -Continue with annual skin exams    3. Actinic keratosis x3 - vertex scalp x1, L lateral forehead x1, L forearm x1  -Cryotherapy procedure note: After verbal consent and discussion of risks and benefits including but no limited to dyspigmentation/scar, blister, and pain, 3 was(were) treated with 1-2mm freeze border for 2 cycles with liquid nitrogen. Post cryotherapy instructions were provided.  -Continue with annual skin exams  -Sunscreen: Apply 20 minutes prior to going outdoors and reapply every two hours, when wet or sweating. We recommend using an SPF 30 or higher, and to use one that is water resistant.         CC Dr. Hernandez on close of this encounter.  Follow-up in 1 year, earlier for new or changing lesions.       Staff Involved:  Staff Only  All risks, benefits and alternatives were discussed with patient.  Patient is in agreement and understands the assessment and plan.  All questions were answered.    Jeannine Agee PA-C, MPAS  [facility] [place]: Phone: [phone], Fax: [phone]  [facility]: Phone: [phone],  Fax: [phone]

## 2020-11-09 NOTE — PROGRESS NOTES
[facility] Dermatology Note    Dermatology Problem List:  1. Actinic keratosis  -Field therapy with dovonex/efudex April 2018  -S/p cryotherapy to lesion on scalp April 2018  2. 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 Nummular Eczema - occasional use of OTC hydrocortisone, emollients    CC:   Chief Complaint   Patient presents with     Skin Check     Father had skin cancer. No areas of concern today.     Encounter Date: Nov 9, 2020    History of Present Illness:  Mr. Serjio Shields is a 60 year old male who returns today for a waist up skin exam. No hx of skin cancer. Hx of AKs. Patient is a . He has no specific spots of concern today. He notes some dark spots on the face and some underlying redness which has seemed to get progressively worse over the years, wondering about treatments for this. He is otherwise well and has no other concerns.     Past Medical History:   Patient Active Problem List   Diagnosis     Kidney stone     Colon polyp     Family history of colon cancer     Family history of prostate cancer     Advanced directives, counseling/discussion     Hyperlipidemia LDL goal <130     History of pulmonary embolism     Seborrheic keratosis     Freckled skin     Elevated blood pressure reading     Posterior vitreous detachment of left eye     History of radial keratotomy     Past Medical History:   Diagnosis Date     Family history of colon cancer      Family history of colon cancer      Pulmonary emboli (H)      Pure hypercholesterolemia      Renal disease     kidney stones     Past Surgical History:   Procedure Laterality Date     COLONOSCOPY  1/16     CYSTOSCOPY, URETEROSCOPY, COMBINED Left 6/24/2015    Procedure: COMBINED CYSTOSCOPY, URETEROSCOPY;  Surgeon: Larry Elias MD;  Location: [facility] OR     EYE SURGERY  6/90    RK on both eyes     SURGICAL HISTORY OF -       meatus dilation in childhood       Social History:  Patient is a , wears  sun protection, especially when flying    Family History:  Numerous skin cancers in father, pt is not sure what types.     Medications:  Current Outpatient Medications   Medication Sig Dispense Refill     Cyanocobalamin (VITAMIN B 12 PO) Take 1 tablet by mouth daily        hydrochlorothiazide (HYDRODIURIL) 50 MG tablet Take 1 tablet (50 mg) by mouth daily 90 tablet 3     simvastatin (ZOCOR) 40 MG tablet TAKE 1 TABLET (40 MG) BY MOUTH AT BEDTIME 90 tablet 3     Allergies   Allergen Reactions     Penicillins Unknown     Unsure of exact reaction, had it as an infant     Review of Systems:  -Constitutional: The patient denies fatigue, fevers, chills, unintended weight loss, and night sweats.  -HEENT: Patient denies nonhealing oral sores.  -Skin: As above in HPI. No additional skin concerns.    Physical exam:  Vitals: There were no vitals taken for this visit.  GEN: This is a well developed, well-nourished male in no acute distress, in a pleasant mood.    SKIN: Waist-up skin, which includes the head/face, neck, both arms, chest, back, abdomen, digits and/or nails was examined.  -There are erythematous macules with overyling adherent scale on the vertex scalp, L arm and L lateral forehead.   -Scattered brown macules on sun exposed areas.  -Scattered telangiectasias over the face  -Infante's skin type I  -No other lesions of concern on areas examined.     Impression/Plan:  1. Lentigines/telagiectasais and mild rosacea -face  -discussed etiology  -refer to cosmetic derm for PDL consult    2. Skin Cancer Screening  -ABCDs of melanoma were discussed and self skin checks were advised.  - Sun precaution was advised including the use of sun screens of SPF 30 or higher, sun protective clothing, and avoidance of tanning beds.  -Continue with annual skin exams    3. Actinic keratosis x3 - vertex scalp x1, L lateral forehead x1, L forearm x1  -Cryotherapy procedure note: After verbal consent and discussion of risks and benefits  including but no limited to dyspigmentation/scar, blister, and pain, 3 was(were) treated with 1-2mm freeze border for 2 cycles with liquid nitrogen. Post cryotherapy instructions were provided.  -Continue with annual skin exams  -Sunscreen: Apply 20 minutes prior to going outdoors and reapply every two hours, when wet or sweating. We recommend using an SPF 30 or higher, and to use one that is water resistant.         CC Dr. Hernandez on close of this encounter.  Follow-up in 1 year, earlier for new or changing lesions.       Staff Involved:  Staff Only  All risks, benefits and alternatives were discussed with patient.  Patient is in agreement and understands the assessment and plan.  All questions were answered.    Jeannine Agee PA-C, MPAS  [facility] [place]: Phone: [phone], Fax: [phone]  [facility]: Phone: [phone],  Fax: [phone]

## 2020-11-22 ENCOUNTER — HEALTH MAINTENANCE LETTER (OUTPATIENT)
Age: 60
End: 2020-11-22

## 2020-11-23 ENCOUNTER — OFFICE VISIT (OUTPATIENT)
Dept: FAMILY MEDICINE | Facility: CLINIC | Age: 60
End: 2020-11-23
Payer: COMMERCIAL

## 2020-11-23 VITALS
OXYGEN SATURATION: 100 % | RESPIRATION RATE: 14 BRPM | HEART RATE: 50 BPM | TEMPERATURE: 97.9 F | DIASTOLIC BLOOD PRESSURE: 70 MMHG | HEIGHT: 68 IN | BODY MASS INDEX: 26.42 KG/M2 | SYSTOLIC BLOOD PRESSURE: 122 MMHG | WEIGHT: 174.3 LBS

## 2020-11-23 DIAGNOSIS — Z12.11 SCREENING FOR COLON CANCER: ICD-10-CM

## 2020-11-23 DIAGNOSIS — Z80.0 FAMILY HISTORY OF COLON CANCER: ICD-10-CM

## 2020-11-23 DIAGNOSIS — Z23 NEED FOR TETANUS BOOSTER: ICD-10-CM

## 2020-11-23 DIAGNOSIS — E78.5 HYPERLIPIDEMIA LDL GOAL <130: Primary | ICD-10-CM

## 2020-11-23 DIAGNOSIS — N20.0 KIDNEY STONE: ICD-10-CM

## 2020-11-23 DIAGNOSIS — Z12.5 SPECIAL SCREENING FOR MALIGNANT NEOPLASM OF PROSTATE: ICD-10-CM

## 2020-11-23 DIAGNOSIS — Z51.81 ENCOUNTER FOR THERAPEUTIC DRUG MONITORING: ICD-10-CM

## 2020-11-23 DIAGNOSIS — R82.994 HYPERCALCIURIA: ICD-10-CM

## 2020-11-23 LAB
ALT SERPL W P-5'-P-CCNC: 27 U/L (ref 0–70)
ANION GAP SERPL CALCULATED.3IONS-SCNC: 5 MMOL/L (ref 3–14)
AST SERPL W P-5'-P-CCNC: 17 U/L (ref 0–45)
BUN SERPL-MCNC: 15 MG/DL (ref 7–30)
CALCIUM SERPL-MCNC: 8.7 MG/DL (ref 8.5–10.1)
CHLORIDE SERPL-SCNC: 104 MMOL/L (ref 94–109)
CHOLEST SERPL-MCNC: 230 MG/DL
CO2 SERPL-SCNC: 31 MMOL/L (ref 20–32)
CREAT SERPL-MCNC: 0.88 MG/DL (ref 0.66–1.25)
GFR SERPL CREATININE-BSD FRML MDRD: >90 ML/MIN/{1.73_M2}
GLUCOSE SERPL-MCNC: 79 MG/DL (ref 70–99)
HDLC SERPL-MCNC: 53 MG/DL
LDLC SERPL CALC-MCNC: 149 MG/DL
NONHDLC SERPL-MCNC: 177 MG/DL
POTASSIUM SERPL-SCNC: 3.6 MMOL/L (ref 3.4–5.3)
PSA SERPL-ACNC: 1.96 UG/L (ref 0–4)
SODIUM SERPL-SCNC: 140 MMOL/L (ref 133–144)
TRIGL SERPL-MCNC: 141 MG/DL

## 2020-11-23 PROCEDURE — 84460 ALANINE AMINO (ALT) (SGPT): CPT | Performed by: PHYSICIAN ASSISTANT

## 2020-11-23 PROCEDURE — 90715 TDAP VACCINE 7 YRS/> IM: CPT | Performed by: PHYSICIAN ASSISTANT

## 2020-11-23 PROCEDURE — 80048 BASIC METABOLIC PNL TOTAL CA: CPT | Performed by: PHYSICIAN ASSISTANT

## 2020-11-23 PROCEDURE — 80061 LIPID PANEL: CPT | Performed by: PHYSICIAN ASSISTANT

## 2020-11-23 PROCEDURE — 84450 TRANSFERASE (AST) (SGOT): CPT | Performed by: PHYSICIAN ASSISTANT

## 2020-11-23 PROCEDURE — G0103 PSA SCREENING: HCPCS | Performed by: PHYSICIAN ASSISTANT

## 2020-11-23 PROCEDURE — 99214 OFFICE O/P EST MOD 30 MIN: CPT | Mod: 25 | Performed by: PHYSICIAN ASSISTANT

## 2020-11-23 PROCEDURE — 90471 IMMUNIZATION ADMIN: CPT | Performed by: PHYSICIAN ASSISTANT

## 2020-11-23 PROCEDURE — 36415 COLL VENOUS BLD VENIPUNCTURE: CPT | Performed by: PHYSICIAN ASSISTANT

## 2020-11-23 RX ORDER — SIMVASTATIN 40 MG
TABLET ORAL
Qty: 90 TABLET | Refills: 3 | Status: SHIPPED | OUTPATIENT
Start: 2020-11-23 | End: 2021-10-14

## 2020-11-23 RX ORDER — HYDROCHLOROTHIAZIDE 50 MG/1
50 TABLET ORAL DAILY
Qty: 90 TABLET | Refills: 3 | Status: SHIPPED | OUTPATIENT
Start: 2020-11-23 | End: 2021-10-14

## 2020-11-23 ASSESSMENT — PAIN SCALES - GENERAL: PAINLEVEL: NO PAIN (0)

## 2020-11-23 ASSESSMENT — MIFFLIN-ST. JEOR: SCORE: 1570.61

## 2020-11-23 NOTE — PROGRESS NOTES
Subjective     Serjio Shields is a 60 year old male who presents to clinic today for the following health issues:    HPI         Hyperlipidemia Follow-Up - simvastatin 40mg   No side effects.   For exercise: bike, lift weights- 3d per week. Cardio 3d per week; feels good with exercise. Limitations with exercise due to: nothing. Denies CV sxs with exercise including CP, SOB, palpitations, HUERTA, presyncope.      Are you regularly taking any medication or supplement to lower your cholesterol?   Yes- no missed doses    Are you having muscle aches or other side effects that you think could be caused by your cholesterol lowering medication?  No    No fam hx of DM.     Prostate cancer screening. Fam hx of prostate cancer- mat gpa. Screens PSA annually. Thinks emptying bladder ok. Up 1 time per night to void.     Kidney stones- last 5 yr ago. Good about water intake. Was started on hydrochlorothiazide for his stones.   On hydrochlorothiazide - Not for HTN.     Do you check your blood pressure regularly outside of the clinic? No     Are you following a low salt diet? Yes    Are your blood pressures ever more than 140 on the top number (systolic) OR more   than 90 on the bottom number (diastolic), for example 140/90?unknowns      How many servings of fruits and vegetables do you eat daily?  4 or more    On average, how many sweetened beverages do you drink each day (Examples: soda, juice, sweet tea, etc.  Do NOT count diet or artificially sweetened beverages)?   0    How many days per week do you exercise enough to make your heart beat faster? 4    How many minutes a day do you exercise enough to make your heart beat faster? 30 - 60    How many days per week do you miss taking your medication? 0    Has had flu shot.   Needs tetanus updated.     Review of Systems   Constitutional, HEENT, cardiovascular, pulmonary, gi and gu systems are negative, except as otherwise noted.      Objective    /70   Pulse 50   Temp 97.9  " F (36.6  C) (Temporal)   Resp 14   Ht 1.736 m (5' 8.35\")   Wt 79.1 kg (174 lb 4.8 oz)   SpO2 100%   BMI 26.23 kg/m    Body mass index is 26.23 kg/m .  Physical Exam   GENERAL: healthy, alert and no distress  EYES: Eyes grossly normal to inspection, PERRL and conjunctivae and sclerae normal  HENT: ear canals and TM's normal, nose and mouth without ulcers or lesions  NECK: no adenopathy, no asymmetry, masses, or scars and thyroid normal to palpation  RESP: lungs clear to auscultation - no rales, rhonchi or wheezes  CV: regular rate and rhythm, normal S1 S2, no S3 or S4, no murmur, click or rub, no peripheral edema and peripheral pulses strong  ABDOMEN: soft, nontender, no hepatosplenomegaly, no masses and bowel sounds normal  MS: no gross musculoskeletal defects noted, no edema  NEURO: Normal strength and tone, mentation intact and speech normal  PSYCH: mentation appears normal, affect normal/bright  LYMPH: normal ant/post cervical, supraclavicular nodes    No results found for any visits on 11/23/20.        Assessment & Plan     Hyperlipidemia LDL goal <130  Refilled statin  Continue healthy exercise habits   Obtain labs today   - Lipid panel reflex to direct LDL Fasting  - ALT  - AST  - simvastatin (ZOCOR) 40 MG tablet; TAKE 1 TABLET (40 MG) BY MOUTH AT BEDTIME    Special screening for malignant neoplasm of prostate  Pt requested screening. Order as below  - PSA, screen    Kidney stone  Hypercalciuria  Refilled his hydrochlorothiazide   BMP as below  - hydrochlorothiazide (HYDRODIURIL) 50 MG tablet; Take 1 tablet (50 mg) by mouth daily  - Basic metabolic panel  (Ca, Cl, CO2, Creat, Gluc, K, Na, BUN)    Screening for colon cancer  Family history of colon cancer  Pt has fam hx and prior polyps.   His last scope was 02/2016. He is due Feb 2021. Order placed- he has done with [facility] in [place] in the past.   - GASTROENTEROLOGY ADULT REF PROCEDURE ONLY; Future    Need for tetanus booster  Given   - TDAP VACCINE " "(Adacel, Boostrix)  [[id number]]  - ADMIN 1st VACCINE       BMI:   Estimated body mass index is 26.23 kg/m  as calculated from the following:    Height as of this encounter: 1.736 m (5' 8.35\").    Weight as of this encounter: 79.1 kg (174 lb 4.8 oz).            Follow Up: The patient was instructed to contact clinic for worsening symptoms, non-improvement as expected/discussed, and for questions regarding medications or treatment plan. Discussed parameters for follow up and included in After Visit Summary given to patient.      Return in about 1 year (around 11/23/2021).    Debbie Whalen PA-C  [facility]    "

## 2020-12-24 NOTE — PATIENT INSTRUCTIONS
Dear Serjio Shields      Your were seen in the [facility].  Basic advice to avoid kidney stones  - Drink 100 ounces of fluid daily  - keep urine volume greater than 72 ounces per day  - low sodium diet (less than 2400 mg sodium per day)  - plenty of dietary calcium.  Please have one high calcium food three times a day with meals - can be either 8 oz milk, 6 oz yogurt or 2 oz low sodium cheese or 8 oz calcium fortified OJ/dairy alternative)   ---- Total should be at least 1000 mg calcium a day from food  - Protein (meat) in moderation  - add lemon or lime to foods and beverages.  Consider 2-4 oz lemon or lime juice diluted in water.  I advise against lemonade since it is high in sugar and low in actual lemon juice.     [email or web address]    Today we discussed continue low salt and high calcium diet.      Please get the following tests done:  24 hour urine in one year  CT scan in one year    Please set up appointment with:  Anita Hoff MD in one year    It was a pleasure meeting with you today. Thank you for allowing me and my team the privilege of caring for you today. YOU are the reason we are here, and I truly hope we provided you with the excellent service you deserve. Please let us know if there is anything else we can do for you so that we can be sure you are leaving completely satisfied with your care experience.    Take care!  Anita Hoff MD  Department of Medicine  Division of Renal Diseases and Hypertension  [facility]    Email: [email or web address]  You may reach a nurse by calling the urology clinic at [phone]        
unkn

## 2021-09-19 ENCOUNTER — HEALTH MAINTENANCE LETTER (OUTPATIENT)
Age: 61
End: 2021-09-19

## 2021-10-12 NOTE — PROGRESS NOTES
"    Assessment & Plan     Hyperlipidemia LDL goal <130  Fasting today for labs   Exercises regularly and on vegetarian diet. Continue healthy habits  Refilled statin  - Lipid panel reflex to direct LDL Fasting; Future  - simvastatin (ZOCOR) 40 MG tablet; TAKE 1 TABLET (40 MG) BY MOUTH AT BEDTIME    Kidney stone  Hypercalciuria  Has not had a kidney stone in 5 yr since being on hydrochlorothiazide   Update labs   Refilled   - Comprehensive metabolic panel (BMP + Alb, Alk Phos, ALT, AST, Total. Bili, TP); Future  - hydrochlorothiazide (HYDRODIURIL) 50 MG tablet; Take 1 tablet (50 mg) by mouth daily    Special screening for malignant neoplasm of prostate  Counseled on screening recommendations. Pt agreeable to annual screening   - PSA, screen; Future    Benign prostatic hyperplasia (BPH) with straining on urination  BPH sx for a few years. Has not treated w/medications.   Start tamsulosin as below. Discussed possible side effects. Follow up if sx are not improving as expected.   - tamsulosin (FLOMAX) 0.4 MG capsule; Take 1 capsule (0.4 mg) by mouth daily    Plantar warts  Area of concern - thick callus on ball of right plantar foot pared down with 15 blade. No embedded FB. Typical appearance of plantar wart (or corn). Discussed home treatment if needed.          BMI:   Estimated body mass index is 25.54 kg/m  as calculated from the following:    Height as of this encounter: 1.736 m (5' 8.35\").    Weight as of this encounter: 77 kg (169 lb 11.2 oz).       Follow Up: The patient was instructed to contact clinic for worsening symptoms, non-improvement in time frame as expected/discussed, and for questions regarding medications or treatment plan. For virtual visits, the patient was advised to be seen for in person evaluation if symptoms or condition are worsening or non-improvement as expected.       No follow-ups on file.    Debbie Whalen PA-C  [facility] BERENICE Bear is a 60 year old who " "presents for the following health issues    History of Present Illness       Hyperlipidemia:  He presents for follow up of hyperlipidemia.  He is taking medication to lower cholesterol. He is not having myalgia or other side effects to statin medications.    He eats 2-3 servings of fruits and vegetables daily.He consumes 1 sweetened beverage(s) daily.He exercises with enough effort to increase his heart rate 20 to 29 minutes per day.  He exercises with enough effort to increase his heart rate 4 days per week.   He is taking medications regularly.       Medication Followup of Simvastatin    Taking Medication as prescribed: yes    Side Effects:  None    Medication Helping Symptoms:  yes   For exercise: gym at home- lift wts 3d per week, ride stationary bike; feels good with exercise. Limitations with exercise due to: nothing. Denies CV sxs with exercise including CP, SOB, palpitations, HUERTA, presyncope.     and took last 6mo off. Plans to return to flying Jan/Feb 2022.     Vegetarian diet. Tried vegan for awhile but didn't like it.     Hydrochlorothiazide - takes for kidney stone prevention, not for hypertension.   Last stone > 5 yr ago.   Staying hydrated.     Prostate cancer screening-  Prostate cancer in mat gpa. Normal last year    BPH- by previous PCP who retired was told had prostate enlargement. He is up 1x per night to void at least. Feels like he strains to urinate. Sometimes not sure he emptys all the way. Can void then 20min later void again. He has never taken a medication for BPH.     Right foot- feels like he stepped on something- wondering if we can look at it. Like stepping on pebble.      Review of Systems   Constitutional, HEENT, cardiovascular, pulmonary, gi and gu systems are negative, except as otherwise noted.      Objective    /76   Pulse 51   Temp 97.2  F (36.2  C) (Temporal)   Resp 16   Ht 1.736 m (5' 8.35\")   Wt 77 kg (169 lb 11.2 oz)   SpO2 96%   BMI 25.54 kg/m  "   Body mass index is 25.54 kg/m .  Physical Exam   GENERAL: healthy, alert and no distress  EYES: Eyes grossly normal to inspection, PERRL and conjunctivae and sclerae normal  HENT: ear canals and TM's normal, nose and mouth without ulcers or lesions  NECK: no adenopathy, no asymmetry, masses, or scars and thyroid normal to palpation  RESP: lungs clear to auscultation - no rales, rhonchi or wheezes  CV: regular rate and rhythm, normal S1 S2, no S3 or S4, no murmur, click or rub, no peripheral edema and peripheral pulses strong  ABDOMEN: soft, nontender, no hepatosplenomegaly, no masses and bowel sounds normal  RECTAL (male): normal sphincter tone, no rectal masses, prostate enlarged, smooth, nontender without nodules or masses  MS: no gross musculoskeletal defects noted, no edema  RIGHT foot: plantar surface:center ball of foot- callus with central thick middle    Results for orders placed or performed in visit on 10/14/21 (from the past 24 hour(s))   Lipid panel reflex to direct LDL Fasting   Result Value Ref Range    Cholesterol 202 (H) <200 mg/dL    Triglycerides 135 <150 mg/dL    Direct Measure HDL 56 >=40 mg/dL    LDL Cholesterol Calculated 119 (H) <=100 mg/dL    Non HDL Cholesterol 146 (H) <130 mg/dL    Patient Fasting > 8hrs? Yes     Narrative    Cholesterol  Desirable:  <200 mg/dL    Triglycerides  Normal:  Less than 150 mg/dL  Borderline High:  150-199 mg/dL  High:  200-499 mg/dL  Very High:  Greater than or equal to 500 mg/dL    Direct Measure HDL  Female:  Greater than or equal to 50 mg/dL   Male:  Greater than or equal to 40 mg/dL    LDL Cholesterol  Desirable:  <100mg/dL  Above Desirable:  100-129 mg/dL   Borderline High:  130-159 mg/dL   High:  160-189 mg/dL   Very High:  >= 190 mg/dL    Non HDL Cholesterol  Desirable:  130 mg/dL  Above Desirable:  130-159 mg/dL  Borderline High:  160-189 mg/dL  High:  190-219 mg/dL  Very High:  Greater than or equal to 220 mg/dL   PSA, screen   Result Value Ref Range     Prostate Specific Antigen Screen 1.99 0.00 - 4.00 ug/L   Comprehensive metabolic panel (BMP + Alb, Alk Phos, ALT, AST, Total. Bili, TP)   Result Value Ref Range    Sodium 138 133 - 144 mmol/L    Potassium 3.9 3.4 - 5.3 mmol/L    Chloride 101 94 - 109 mmol/L    Carbon Dioxide (CO2) 33 (H) 20 - 32 mmol/L    Anion Gap 4 3 - 14 mmol/L    Urea Nitrogen 15 7 - 30 mg/dL    Creatinine 0.88 0.66 - 1.25 mg/dL    Calcium 9.0 8.5 - 10.1 mg/dL    Glucose 92 70 - 99 mg/dL    Alkaline Phosphatase 75 40 - 150 U/L    AST 20 0 - 45 U/L    ALT 33 0 - 70 U/L    Protein Total 7.3 6.8 - 8.8 g/dL    Albumin 3.9 3.4 - 5.0 g/dL    Bilirubin Total 0.8 0.2 - 1.3 mg/dL    GFR Estimate >90 >60 mL/min/1.73m2

## 2021-10-14 ENCOUNTER — OFFICE VISIT (OUTPATIENT)
Dept: FAMILY MEDICINE | Facility: CLINIC | Age: 61
End: 2021-10-14
Payer: COMMERCIAL

## 2021-10-14 VITALS
TEMPERATURE: 97.2 F | BODY MASS INDEX: 25.72 KG/M2 | WEIGHT: 169.7 LBS | RESPIRATION RATE: 16 BRPM | HEART RATE: 51 BPM | OXYGEN SATURATION: 96 % | SYSTOLIC BLOOD PRESSURE: 126 MMHG | DIASTOLIC BLOOD PRESSURE: 76 MMHG | HEIGHT: 68 IN

## 2021-10-14 DIAGNOSIS — N20.0 KIDNEY STONE: ICD-10-CM

## 2021-10-14 DIAGNOSIS — B07.0 PLANTAR WARTS: ICD-10-CM

## 2021-10-14 DIAGNOSIS — E78.5 HYPERLIPIDEMIA LDL GOAL <130: Primary | ICD-10-CM

## 2021-10-14 DIAGNOSIS — R39.16 BENIGN PROSTATIC HYPERPLASIA (BPH) WITH STRAINING ON URINATION: ICD-10-CM

## 2021-10-14 DIAGNOSIS — N40.1 BENIGN PROSTATIC HYPERPLASIA (BPH) WITH STRAINING ON URINATION: ICD-10-CM

## 2021-10-14 DIAGNOSIS — Z12.5 SPECIAL SCREENING FOR MALIGNANT NEOPLASM OF PROSTATE: ICD-10-CM

## 2021-10-14 DIAGNOSIS — R82.994 HYPERCALCIURIA: ICD-10-CM

## 2021-10-14 LAB
ALBUMIN SERPL-MCNC: 3.9 G/DL (ref 3.4–5)
ALP SERPL-CCNC: 75 U/L (ref 40–150)
ALT SERPL W P-5'-P-CCNC: 33 U/L (ref 0–70)
ANION GAP SERPL CALCULATED.3IONS-SCNC: 4 MMOL/L (ref 3–14)
AST SERPL W P-5'-P-CCNC: 20 U/L (ref 0–45)
BILIRUB SERPL-MCNC: 0.8 MG/DL (ref 0.2–1.3)
BUN SERPL-MCNC: 15 MG/DL (ref 7–30)
CALCIUM SERPL-MCNC: 9 MG/DL (ref 8.5–10.1)
CHLORIDE BLD-SCNC: 101 MMOL/L (ref 94–109)
CHOLEST SERPL-MCNC: 202 MG/DL
CO2 SERPL-SCNC: 33 MMOL/L (ref 20–32)
CREAT SERPL-MCNC: 0.88 MG/DL (ref 0.66–1.25)
FASTING STATUS PATIENT QL REPORTED: YES
GFR SERPL CREATININE-BSD FRML MDRD: >90 ML/MIN/1.73M2
GLUCOSE BLD-MCNC: 92 MG/DL (ref 70–99)
HDLC SERPL-MCNC: 56 MG/DL
LDLC SERPL CALC-MCNC: 119 MG/DL
NONHDLC SERPL-MCNC: 146 MG/DL
POTASSIUM BLD-SCNC: 3.9 MMOL/L (ref 3.4–5.3)
PROT SERPL-MCNC: 7.3 G/DL (ref 6.8–8.8)
PSA SERPL-MCNC: 1.99 UG/L (ref 0–4)
SODIUM SERPL-SCNC: 138 MMOL/L (ref 133–144)
TRIGL SERPL-MCNC: 135 MG/DL

## 2021-10-14 PROCEDURE — 36415 COLL VENOUS BLD VENIPUNCTURE: CPT | Performed by: PHYSICIAN ASSISTANT

## 2021-10-14 PROCEDURE — 80061 LIPID PANEL: CPT | Performed by: PHYSICIAN ASSISTANT

## 2021-10-14 PROCEDURE — 80053 COMPREHEN METABOLIC PANEL: CPT | Performed by: PHYSICIAN ASSISTANT

## 2021-10-14 PROCEDURE — 99214 OFFICE O/P EST MOD 30 MIN: CPT | Performed by: PHYSICIAN ASSISTANT

## 2021-10-14 PROCEDURE — G0103 PSA SCREENING: HCPCS | Performed by: PHYSICIAN ASSISTANT

## 2021-10-14 RX ORDER — TAMSULOSIN HYDROCHLORIDE 0.4 MG/1
0.4 CAPSULE ORAL DAILY
Qty: 90 CAPSULE | Refills: 3 | Status: SHIPPED | OUTPATIENT
Start: 2021-10-14 | End: 2022-09-26

## 2021-10-14 RX ORDER — SIMVASTATIN 40 MG
TABLET ORAL
Qty: 90 TABLET | Refills: 3 | Status: SHIPPED | OUTPATIENT
Start: 2021-10-14 | End: 2022-11-22

## 2021-10-14 RX ORDER — HYDROCHLOROTHIAZIDE 50 MG/1
50 TABLET ORAL DAILY
Qty: 90 TABLET | Refills: 3 | Status: SHIPPED | OUTPATIENT
Start: 2021-10-14 | End: 2022-12-22

## 2021-10-14 ASSESSMENT — PAIN SCALES - GENERAL: PAINLEVEL: NO PAIN (0)

## 2021-10-14 ASSESSMENT — MIFFLIN-ST. JEOR: SCORE: 1549.81

## 2021-10-14 NOTE — PATIENT INSTRUCTIONS
Patient Education     BPH (Enlarged Prostate)   The prostate is a gland at the base of the bladder. As some men get older, the prostate may get bigger. This problem is called benign prostatic hyperplasia (BPH). BPH puts pressure on the urethra. This is the tube that carries urine from the bladder to the penis. It may interfere with the flow of urine. It may also keep the bladder from emptying fully.    Symptoms of BPH include trouble starting urination and feeling as though the bladder isn t emptying all the way. It also includes a weak urine stream, dribbling and leaking of urine, and frequent and urgent urination (especially at night). BPH can increase the risk of urinary infections. It can also block off urine flow completely. If this occurs, a thin tube (catheter) may be passed into the bladder to help drain urine.  If symptoms are mild, no treatment may be needed right now. If symptoms are more severe, treatment is likely needed. The goal of treatment is to improve urine flow and reduce symptoms. Treatments can include medicine and procedures. Your healthcare provider will talk about treatment options with you as needed.  Home care  The following guidelines will help you care for yourself at home:    Urinate as soon as you feel the urge. Don't try to hold your urine.    Don't limit your fluid intake during the day. Drink 6 to 8 glasses of water or liquids a day. This prevents bacteria from building up in the bladder.    Don't drink fluids after dinner. This helps to reduce urination during the night.    Don't take medicines that can make your symptoms worse. These include certain cold and allergy medicines and antidepressants. Diuretics used for high blood pressure can also make symptoms worse. Talk with your healthcare provider about the medicines you take. Other choices may work better for you.  Prostate cancer screening  BPH does not increase the risk of prostate cancer. But because prostate cancer is a  common cancer in men, screening is sometimes advised. This may help find the cancer in its early stages when treatment is most effective. Factors that can increase the risk of prostate cancer include being  or having a father or brother who had prostate cancer. A high-fat diet may also increase the risk of prostate cancer. Talk with your healthcare provider to see if you should be screened for prostate cancer.  Follow-up care  Follow up with your healthcare provider, or as advised  To learn more, go to:    National Kidney & Urologic Diseases Information Clearinghouse kidney.niddk.nih.gov, 298.458.6629  When to get medical advice  Call your healthcare provider right away if any of these occur:    Fever of 100.4 F (38.0 C) or higher, or as advised    Unable to pass urine for 8 hours    More pressure or pain in your lower belly (bladder)    Blood in the urine    Increasing low back pain that is not linked to injury    Symptoms of urinary infection (increased urge to urinate, burning when passing urine, bad-smelling urine)  [employer] last reviewed this educational content on 11/1/2019 2000-2021 [employer]. All rights reserved. This information is not intended as a substitute for professional medical care. Always follow your healthcare professional's instructions.           Patient Education     Tamsulosin Hydrochloride Oral capsule  What is this medicine?  TAMSULOSIN (lofton MANISHA irving sin) is used to treat enlargement of the prostate gland in men, a condition called benign prostatic hyperplasia or BPH. It is not for use in women. It works by relaxing muscles in the prostate and bladder neck. This improves urine flow and reduces BPH symptoms.  This medicine may be used for other purposes; ask your health care provider or pharmacist if you have questions.  What should I tell my health care provider before I take this medicine?  They need to know if you have any of the following  conditions:    advanced kidney disease    advanced liver disease    low blood pressure    prostate cancer    an unusual or allergic reaction to tamsulosin, sulfa drugs, other medicines, foods, dyes, or preservatives    pregnant or trying to get pregnant    breast-feeding  How should I use this medicine?  Take this medicine by mouth about 30 minutes after the same meal every day. Follow the directions on the prescription label. Swallow the capsules whole with a glass of water. Do not crush, chew, or open capsules. Do not take your medicine more often than directed. Do not stop taking your medicine unless your doctor tells you to.  Talk to your pediatrician regarding the use of this medicine in children. Special care may be needed.  Overdosage: If you think you have taken too much of this medicine contact a poison control center or emergency room at once.  NOTE: This medicine is only for you. Do not share this medicine with others.  What if I miss a dose?  If you miss a dose, take it as soon as you can. If it is almost time for your next dose, take only that dose. Do not take double or extra doses. If you stop taking your medicine for several days or more, ask your doctor or health care professional what dose you should start back on.  What may interact with this medicine?    cimetidine    fluoxetine    ketoconazole    medicines for erectile disfunction like sildenafil, tadalafil, vardenafil    medicines for high blood pressure    other alpha-blockers like alfuzosin, doxazosin, phentolamine, phenoxybenzamine, prazosin, terazosin    warfarin  This list may not describe all possible interactions. Give your health care provider a list of all the medicines, herbs, non-prescription drugs, or dietary supplements you use. Also tell them if you smoke, drink alcohol, or use illegal drugs. Some items may interact with your medicine.  What should I watch for while using this medicine?  Visit your doctor or health care  professional for regular check ups. You will need lab work done before you start this medicine and regularly while you are taking it. Check your blood pressure as directed. Ask your health care professional what your blood pressure should be, and when you should contact him or her.  This medicine may make you feel dizzy or lightheaded. This is more likely to happen after the first dose, after an increase in dose, or during hot weather or exercise. Drinking alcohol and taking some medicines can make this worse. Do not drive, use machinery, or do anything that needs mental alertness until you know how this medicine affects you. Do not sit or stand up quickly. If you begin to feel dizzy, sit down until you feel better. These effects can decrease once your body adjusts to the medicine.  Contact your doctor or health care professional right away if you have an erection that lasts longer than 4 hours or if it becomes painful. This may be a sign of a serious problem and must be treated right away to prevent permanent damage.  If you are thinking of having cataract surgery, tell your eye surgeon that you have taken this medicine.  What side effects may I notice from receiving this medicine?  Side effects that you should report to your doctor or health care professional as soon as possible:    allergic reactions like skin rash or itching, hives, swelling of the lips, mouth, tongue, or throat    breathing problems    change in vision    feeling faint or lightheaded    irregular heartbeat    prolonged or painful erection    weakness  Side effects that usually do not require medical attention (report to your doctor or health care professional if they continue or are bothersome):    back pain    change in sex drive or performance    constipation, nausea or vomiting    cough    drowsy    runny or stuffy nose    trouble sleeping  This list may not describe all possible side effects. Call your doctor for medical advice about side  effects. You may report side effects to FDA at 6-854-FDA-2951.  Where should I keep my medicine?  Keep out of the reach of children.  Store at room temperature between 15 and 30 degrees C (59 and 86 degrees F). Throw away any unused medicine after the expiration date.  NOTE:This sheet is a summary. It may not cover all possible information. If you have questions about this medicine, talk to your doctor, pharmacist, or health care provider. Copyright  2016 Gold Standard         Remove callous over the wart with pumice stone daily. Then apply the topical liquid salicylic acid (comes in nail polish like bottle- paint on).     Return to the clinic for repeat cryotherapy in 3-4 weeks if the wart is still present.

## 2021-10-25 ENCOUNTER — OFFICE VISIT (OUTPATIENT)
Dept: DERMATOLOGY | Facility: CLINIC | Age: 61
End: 2021-10-25
Payer: COMMERCIAL

## 2021-10-25 DIAGNOSIS — D18.01 CHERRY ANGIOMA: ICD-10-CM

## 2021-10-25 DIAGNOSIS — L82.1 SEBORRHEIC KERATOSIS: ICD-10-CM

## 2021-10-25 DIAGNOSIS — L81.4 SOLAR LENTIGO: ICD-10-CM

## 2021-10-25 DIAGNOSIS — L57.0 ACTINIC KERATOSIS: ICD-10-CM

## 2021-10-25 DIAGNOSIS — L57.8 ACTINIC SKIN DAMAGE: Primary | ICD-10-CM

## 2021-10-25 DIAGNOSIS — Z85.828 HISTORY OF NONMELANOMA SKIN CANCER: ICD-10-CM

## 2021-10-25 DIAGNOSIS — D22.9 MULTIPLE BENIGN NEVI: ICD-10-CM

## 2021-10-25 PROCEDURE — 17000 DESTRUCT PREMALG LESION: CPT | Performed by: DERMATOLOGY

## 2021-10-25 PROCEDURE — 99213 OFFICE O/P EST LOW 20 MIN: CPT | Mod: 25 | Performed by: DERMATOLOGY

## 2021-10-25 PROCEDURE — 17003 DESTRUCT PREMALG LES 2-14: CPT | Performed by: DERMATOLOGY

## 2021-10-25 RX ORDER — CALCIPOTRIENE 50 UG/G
CREAM TOPICAL
Qty: 60 G | Refills: 0 | Status: SHIPPED | OUTPATIENT
Start: 2021-10-25 | End: 2021-11-24

## 2021-10-25 RX ORDER — FLUOROURACIL 50 MG/G
CREAM TOPICAL
Qty: 40 G | Refills: 0 | Status: SHIPPED | OUTPATIENT
Start: 2021-10-25 | End: 2021-11-24

## 2021-10-25 ASSESSMENT — PAIN SCALES - GENERAL: PAINLEVEL: NO PAIN (0)

## 2021-10-25 NOTE — PATIENT INSTRUCTIONS
Efudex Treatment    Today, you are being prescribed Fluorouracil (Efudex) and Calcipotriene, topical creams used for the treatment of Actinic Keratosis (AKs).  The medication is working to eliminate the unhealthy cells.  This treatment may be unattractive and somewhat uncomfortable.    Your treatment will last 4-7 days on the forehead, temples, and cheeks. Scalp and ears are okay, also. You may experience some mild discomfort while being treated.    You will want to stop any other creams such as glycolic acid products, retin A, Tazorac, etc. to the area. You may use bland makeup/cover-up as long as it doesn't sting or cause you discomfort.    Apply the cream at night as your physician recommends. Use a cotton-tipped applicator, or use gloves if applying it with your fingertips. If applied with unprotected fingertips, it is important to wash your hands well after you apply this medicine.     Keep this medication away from pets.    We recommend avoiding excessive sun exposure to the treated area    You may use moisturizing creams over bothersome areas such as Vanicream or Cetaphil cream if the reaction becomes too bothersome. Please, call the clinic if this occurs.   Potential Side Effects    Your treated areas may be unsightly during therapy.  This will improve slowly following the discontinuation of therapy.     During the first week of application, mild inflammation may occur.     During the following weeks, redness, and swelling may occur with some crusting and burning.     Lesions resolve as the skin exfoliates.     Over 1 to 2 weeks, new skin grows into the treatment area.    Keep this medication away from pets  Specific side effects that usually do not require medical attention (report to your doctor or health care professional if they continue or are bothersome) include:    Red or dark-colored skin     Mild erosion (loss of upper layer of skin)     Mild eye irritation including burning, itching, sensitivity,  stinging, or watering     Increased sensitivity of the skin to sun and ultraviolet light     Pain and burning of the affected area     Dryness, scaling or swelling of the affected area     Skin rash, itching of the affected area     Tenderness   If you have severe pain, please, call the clinic immediately and indicate that you have pain.  Ask for a call from the RN.     Who should I call with questions?    [facility]: [phone]    [facility]: [phone]    For urgent needs outside of business hours call the [facility] at [phone] and ask for the dermatology resident on call      Cryotherapy    What is it?    Use of a very cold liquid, such as liquid nitrogen, to freeze and destroy abnormal skin cells that need to be removed    What should I expect?    Tenderness and redness    A small blister that might grow and fill with dark purple blood. There may be crusting.    More than one treatment may be needed if the lesions do not go away.    How do I care for the treated area?    Gently wash the area with your hands when bathing.    Use a thin layer of Vaseline to help with healing. You may use a Band-Aid.     The area should heal within 7-10 days and may leave behind a pink or lighter color.     Do not use an antibiotic or Neosporin ointment.     You may take acetaminophen (Tylenol) for pain.     Call your doctor if you have:    Severe pain    Signs of infection (warmth, redness, cloudy yellow drainage, and or a bad smell)    Questions or concerns    Who should I call with questions?       [facility]: [phone]       [facility]: [phone]       For urgent needs outside of business hours call the [facility] at [phone] and ask for the dermatology resident on call

## 2021-10-25 NOTE — LETTER
10/25/2021         RE: Serjio Shields  [address]  [place] [address]        Dear Colleague,    Thank you for referring your patient, Serjio Shields, to the [facility]. Please see a copy of my visit note below.    [facility] Dermatology Note  Encounter Date: Oct 25, 2021  Office Visit     Dermatology Problem List:  1. Actinic keratosis  -Field therapy with dovonex/efudex April 2018, re-initiated on 10/25/21  -S/p cryotherapy to lesion on scalp April 2018  2. 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 Nummular Eczema - occasional use of OTC hydrocortisone,     Social history: . Wears sun protection, especially when flying. Has a puppy.  Family history: Skin cancer in father, uncertain type.  ____________________________________________    Assessment & Plan:    # Benign lesions: Multiple benign nevi, solar lentigos, seborrheic keratoses, cherry angiomas. Explained to patient benign nature of lesion. No treatment is necessary at this time unless the lesion changes or becomes symptomatic.   - ABCDs of melanoma were discussed and self skin checks were advised.  - Sun precaution was advised including the use of sun screens of SPF 30 or higher, sun protective clothing, and avoidance of tanning beds.    # Actinic keratosis. - right cheek, right temple, and left temple x 5.  - See cryotherapy procedure note below.    # Actinic damage of the forehead, temples, and cheeks. Chronic, flare.   - Initiate Efudex 5% cream and Calcipotriene 0.005% cream applied to the forehead, temples, and cheeks BID for 4-7 days. Discussed that patient should expect redness, blistering and scabbing as well as time course discussed. Informed patient to keep away from children/pets and to wash hands after application.    # Patient requesting referral for loss of taste/smell from COVID-19.  - Recommended following up with PCP.     Procedures Performed:   -  Cryotherapy procedure note, locations: right cheek, right temple, and left temple. After verbal consent and discussion of risks and benefits including, but not limited to, dyspigmentation/scar, blister, and pain, 5 AKs were treated with 1-2 mm freeze border for 1-2 cycles with liquid nitrogen. Post cryotherapy instructions were provided.    Follow-up: 1 year in-person for skin check, or earlier for new or changing lesions.    Staff and Scribe:     Scribe Disclosure:   I, Angela Smith, am serving as a scribe to document services personally performed by this physician, Dr. Faisal Nicolas, based on data collection and the provider's statements to me.     Provider Disclosure:   The documentation recorded by the scribe accurately reflects the services I personally performed and the decisions made by me.    Faisal Nicolas MD    Department of Dermatology  [facility]: Phone: [phone], Fax:[phone]  [facility]: Phone: [phone] Fax: [phone]    ____________________________________________    CC: Skin Check (no concerns today. waist up skin check. )    HPI:  Mr. Serjio Shields is a(n) 61 year old male who presents today as a return patient for skin check.    Last seen 11/9/2020 by Jeannine Agee PA-C. At that time, cryotherapy was performed on 3 AKs (vertex scalp, left lateral forehead, and left forearm). A referral was also made for PDL of telangiectasias and rosacea of the face.    Today patient notes no areas of concern.    Wears hats. Does not have many outdoor hobbies. Does not like being in the sun.    The patient otherwise denies any new or concerning lesions. No bleeding, painful, pruritic, or changing lesions. Health otherwise stable. No other skin concerns.    Labs Reviewed:  N/A    Physical Exam:  Vitals: There were no vitals taken for this visit.  SKIN: Waist-up skin,  which includes the head/face, neck, both arms, chest, back, abdomen, digits and/or nails was examined.  - Brown macules on sun exposed areas  - Brown waxy, stuck-on appearing papules on face, trunk, and extremities.   - Brown macules and papules on trunk and extremities without concerning dermoscopy features  - Red vascular papules on face, trunk and extremities.   - Pink gritty papules on the right cheek x1, right temple x1, and left temple x3.  - No other lesions of concern on areas examined.     Medications:  Current Outpatient Medications   Medication     Cyanocobalamin (VITAMIN B 12 PO)     hydrochlorothiazide (HYDRODIURIL) 50 MG tablet     simvastatin (ZOCOR) 40 MG tablet     tamsulosin (FLOMAX) 0.4 MG capsule     No current facility-administered medications for this visit.      Past Medical History:   Patient Active Problem List   Diagnosis     Kidney stone     Colon polyp     Family history of colon cancer     Family history of prostate cancer     Advanced directives, counseling/discussion     Hyperlipidemia LDL goal <130     History of pulmonary embolism     Seborrheic keratosis     Freckled skin     Elevated blood pressure reading     Posterior vitreous detachment of left eye     History of radial keratotomy     Past Medical History:   Diagnosis Date     Family history of colon cancer      Family history of colon cancer      Pulmonary emboli (H)      Pure hypercholesterolemia      Renal disease     kidney stones       Again, thank you for allowing me to participate in the care of your patient.        Sincerely,        Faisal Nicolas MD

## 2021-10-25 NOTE — PROGRESS NOTES
[facility] Dermatology Note  Encounter Date: Oct 25, 2021  Office Visit     Dermatology Problem List:  1. Actinic keratosis  -Field therapy with dovonex/efudex April 2018, re-initiated on 10/25/21  -S/p cryotherapy to lesion on scalp April 2018  2. 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 Nummular Eczema - occasional use of OTC hydrocortisone,     Social history: . Wears sun protection, especially when flying. Has a puppy.  Family history: Skin cancer in father, uncertain type.  ____________________________________________    Assessment & Plan:    # Benign lesions: Multiple benign nevi, solar lentigos, seborrheic keratoses, cherry angiomas. Explained to patient benign nature of lesion. No treatment is necessary at this time unless the lesion changes or becomes symptomatic.   - ABCDs of melanoma were discussed and self skin checks were advised.  - Sun precaution was advised including the use of sun screens of SPF 30 or higher, sun protective clothing, and avoidance of tanning beds.    # Actinic keratosis. - right cheek, right temple, and left temple x 5.  - See cryotherapy procedure note below.    # Actinic damage of the forehead, temples, and cheeks. Chronic, flare.   - Initiate Efudex 5% cream and Calcipotriene 0.005% cream applied to the forehead, temples, and cheeks BID for 4-7 days. Discussed that patient should expect redness, blistering and scabbing as well as time course discussed. Informed patient to keep away from children/pets and to wash hands after application.    # Patient requesting referral for loss of taste/smell from COVID-19.  - Recommended following up with PCP.     Procedures Performed:   - Cryotherapy procedure note, locations: right cheek, right temple, and left temple. After verbal consent and discussion of risks and benefits including, but not limited to, dyspigmentation/scar, blister, and pain, 5 AKs were treated with 1-2 mm freeze border  for 1-2 cycles with liquid nitrogen. Post cryotherapy instructions were provided.    Follow-up: 1 year in-person for skin check, or earlier for new or changing lesions.    Staff and Scribe:     Scribe Disclosure:   I, Angela Smith, am serving as a scribe to document services personally performed by this physician, Dr. Faisal Nicolas, based on data collection and the provider's statements to me.     Provider Disclosure:   The documentation recorded by the scribe accurately reflects the services I personally performed and the decisions made by me.    Faisal Nicolas MD    Department of Dermatology  [facility]: Phone: [phone], Fax:[phone]  [facility] [place]: Phone: [phone] Fax: [phone]    ____________________________________________    CC: Skin Check (no concerns today. waist up skin check. )    HPI:  Mr. Serjio Shields is a(n) 61 year old male who presents today as a return patient for skin check.    Last seen 11/9/2020 by Jeannine Agee PA-C. At that time, cryotherapy was performed on 3 AKs (vertex scalp, left lateral forehead, and left forearm). A referral was also made for PDL of telangiectasias and rosacea of the face.    Today patient notes no areas of concern.    Wears hats. Does not have many outdoor hobbies. Does not like being in the sun.    The patient otherwise denies any new or concerning lesions. No bleeding, painful, pruritic, or changing lesions. Health otherwise stable. No other skin concerns.    Labs Reviewed:  N/A    Physical Exam:  Vitals: There were no vitals taken for this visit.  SKIN: Waist-up skin, which includes the head/face, neck, both arms, chest, back, abdomen, digits and/or nails was examined.  - Brown macules on sun exposed areas  - Brown waxy, stuck-on appearing papules on face, trunk, and extremities.   - Brown macules and papules on trunk and  extremities without concerning dermoscopy features  - Red vascular papules on face, trunk and extremities.   - Pink gritty papules on the right cheek x1, right temple x1, and left temple x3.  - No other lesions of concern on areas examined.     Medications:  Current Outpatient Medications   Medication     Cyanocobalamin (VITAMIN B 12 PO)     hydrochlorothiazide (HYDRODIURIL) 50 MG tablet     simvastatin (ZOCOR) 40 MG tablet     tamsulosin (FLOMAX) 0.4 MG capsule     No current facility-administered medications for this visit.      Past Medical History:   Patient Active Problem List   Diagnosis     Kidney stone     Colon polyp     Family history of colon cancer     Family history of prostate cancer     Advanced directives, counseling/discussion     Hyperlipidemia LDL goal <130     History of pulmonary embolism     Seborrheic keratosis     Freckled skin     Elevated blood pressure reading     Posterior vitreous detachment of left eye     History of radial keratotomy     Past Medical History:   Diagnosis Date     Family history of colon cancer      Family history of colon cancer      Pulmonary emboli (H)      Pure hypercholesterolemia      Renal disease     kidney stones

## 2021-11-23 DIAGNOSIS — L57.0 ACTINIC KERATOSIS: ICD-10-CM

## 2021-11-23 DIAGNOSIS — L57.8 ACTINIC SKIN DAMAGE: ICD-10-CM

## 2021-11-24 RX ORDER — FLUOROURACIL 50 MG/G
CREAM TOPICAL
Qty: 40 G | Refills: 0 | Status: SHIPPED | OUTPATIENT
Start: 2021-11-24 | End: 2022-09-07

## 2021-11-24 RX ORDER — CALCIPOTRIENE 50 UG/G
CREAM TOPICAL
Qty: 60 G | Refills: 0 | Status: SHIPPED | OUTPATIENT
Start: 2021-11-24 | End: 2022-09-07

## 2021-11-24 NOTE — TELEPHONE ENCOUNTER
calcipotriene (DOVONOX) 0.005 % external cream    Last Written Prescription Date:  10/25/21  Last Fill Quantity: 60 g,   # refills: 0  fluorouracil (EFUDEX) 5 % external cream  Last Written Prescription Date:  10/15/21  Last Fill Quantity: 60 g ,   # refills: 0  Last Office Visit : 10/25/21  Future Office visit:  1 year in-person for skin check, or earlier for new or changing lesions.        Efudex( process #4-  May send 1 refill to finish course of RX) and Calcipotriene ( RF process #2) .

## 2022-01-09 ENCOUNTER — HEALTH MAINTENANCE LETTER (OUTPATIENT)
Age: 62
End: 2022-01-09

## 2022-03-09 ENCOUNTER — OFFICE VISIT (OUTPATIENT)
Dept: OTOLARYNGOLOGY | Facility: CLINIC | Age: 62
End: 2022-03-09
Payer: COMMERCIAL

## 2022-03-09 DIAGNOSIS — R43.0 LOSS OF SENSE OF SMELL: Primary | ICD-10-CM

## 2022-03-09 PROCEDURE — 99204 OFFICE O/P NEW MOD 45 MIN: CPT | Mod: 25 | Performed by: OTOLARYNGOLOGY

## 2022-03-09 PROCEDURE — 31575 DIAGNOSTIC LARYNGOSCOPY: CPT | Performed by: OTOLARYNGOLOGY

## 2022-03-09 ASSESSMENT — ENCOUNTER SYMPTOMS
HEARTBURN: 0
BLURRED VISION: 0
STRIDOR: 0
DOUBLE VISION: 0
HEADACHES: 0
COUGH: 0
VOMITING: 0
PHOTOPHOBIA: 0
SINUS PAIN: 0
TREMORS: 0
NAUSEA: 0
HEMOPTYSIS: 0
BRUISES/BLEEDS EASILY: 0
SORE THROAT: 0
DIZZINESS: 0
CONSTITUTIONAL NEGATIVE: 1
TINGLING: 0
SPUTUM PRODUCTION: 0

## 2022-03-09 ASSESSMENT — PAIN SCALES - GENERAL: PAINLEVEL: NO PAIN (0)

## 2022-03-09 NOTE — NURSING NOTE
Serjio Shields's goals for this visit include:   Chief Complaint   Patient presents with     Ent Problem     loss of smell, taste following covid 2 years ago       He requests these members of his care team be copied on today's visit information:     PCP: Sony Kidd    Referring Provider:  No referring provider defined for this encounter.    There were no vitals taken for this visit.    Do you need any medication refills at today's visit? No    Anita Moreno RN

## 2022-03-09 NOTE — LETTER
3/9/2022         RE: Serjio Shields  [id number]  [place] [address]        Dear Colleague,    Thank you for referring your patient, Serjio Shields, to the [facility]. Please see a copy of my visit note below.    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 States nasal congestion and seasonal allergies. Denies any facial pressure, trauma, head and neck surgeries, or exposure to any toxic medications or nasal sprays.    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    ENT Problem List  Posterior vitreous detachment of left eye    Kidney stone    Colon polyp    Family history of colon cancer    Family history of prostate cancer    Advanced directives, counseling/discussion    Hyperlipidemia LDL goal <130    History of pulmonary embolism    Seborrheic keratosis    Freckled skin    Elevated blood pressure reading    History of radial keratotomy      Medications         calcipotriene (DOVONOX) 0.005 % external cream      Cyanocobalamin (VITAMIN B 12 PO)      fluorouracil (EFUDEX) 5 % external cream      hydrochlorothiazide (HYDRODIURIL) 50 MG tablet      simvastatin (ZOCOR) 40 MG tablet      tamsulosin (FLOMAX) 0.4 MG capsule       Review of Systems   Constitutional: Negative.    HENT: Positive for congestion and nosebleeds. Negative for sinus pain and sore throat.    Eyes: Negative for blurred vision, double vision and photophobia.   Respiratory: Negative for cough, hemoptysis, sputum production and stridor.    Gastrointestinal: Negative for heartburn, nausea and vomiting.   Skin: Negative.    Neurological: Negative for dizziness, tingling, tremors and headaches.   Endo/Heme/Allergies: Positive for environmental allergies. Does not bruise/bleed easily.         Physical Exam  Vitals reviewed.   Constitutional:       Appearance: Normal appearance.   HENT:      Head: Normocephalic and atraumatic.       Right Ear: Hearing, tympanic membrane, ear canal and external ear normal. There is no impacted cerumen.      Left Ear: Hearing, tympanic membrane, ear canal and external ear normal. There is no impacted cerumen.      Nose: Mucosal edema, congestion and rhinorrhea present.      Right Turbinates: Swollen.      Left Turbinates: Swollen.      Mouth/Throat:      Mouth: Mucous membranes are moist.      Pharynx: Oropharynx is clear. Uvula midline.   Eyes:      Extraocular Movements: Extraocular movements intact.      Pupils: Pupils are equal, round, and reactive to light.   Neurological:      Mental Status: He is alert.     Diagnostic nasal endoscopy:     He was seen in the room and identified. Pros and cons of the procedure were explained to the patient. The procedure and its alternatives were explained to the patient in lay terms. His questions were answered. His symptoms required a diagnostic endoscopic evaluation under local anesthesia. After obtaining informed consent from the patient, 3% Lidocaine with oxymetazoline spray was applied to each nostril. Then a flexible scopic exam was performed. Septum is slightly deviated to the right and then to the left. Ostiomeatal complexes are free of disease. No pus or polyp seen. Inferior turbinates are enlarged. Nasopharynx is normal. Rosenmüller fossa and torus tubarius are normal. Epiglottis, hypopharynx, false vocal folds are normal. No pooling in pyriform fossae. Vocal cords are mobile, showing post-glottic  edema otherwise normal. He tolerated the procedure well and left the room with no complications.      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 I will give him a topical nasal steroid spray and have an MRI to r/o any anterior skull base lesions. F/up as scheduled.        Again, thank you for allowing me to participate in the care of your patient.        Sincerely,        Tasia Hays MD

## 2022-03-09 NOTE — PROGRESS NOTES
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 States nasal congestion and seasonal allergies. Denies any facial pressure, trauma, head and neck surgeries, or exposure to any toxic medications or nasal sprays.    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    ENT Problem List  Posterior vitreous detachment of left eye    Kidney stone    Colon polyp    Family history of colon cancer    Family history of prostate cancer    Advanced directives, counseling/discussion    Hyperlipidemia LDL goal <130    History of pulmonary embolism    Seborrheic keratosis    Freckled skin    Elevated blood pressure reading    History of radial keratotomy      Medications         calcipotriene (DOVONOX) 0.005 % external cream      Cyanocobalamin (VITAMIN B 12 PO)      fluorouracil (EFUDEX) 5 % external cream      hydrochlorothiazide (HYDRODIURIL) 50 MG tablet      simvastatin (ZOCOR) 40 MG tablet      tamsulosin (FLOMAX) 0.4 MG capsule       Review of Systems   Constitutional: Negative.    HENT: Positive for congestion and nosebleeds. Negative for sinus pain and sore throat.    Eyes: Negative for blurred vision, double vision and photophobia.   Respiratory: Negative for cough, hemoptysis, sputum production and stridor.    Gastrointestinal: Negative for heartburn, nausea and vomiting.   Skin: Negative.    Neurological: Negative for dizziness, tingling, tremors and headaches.   Endo/Heme/Allergies: Positive for environmental allergies. Does not bruise/bleed easily.         Physical Exam  Vitals reviewed.   Constitutional:       Appearance: Normal appearance.   HENT:      Head: Normocephalic and atraumatic.      Right Ear: Hearing, tympanic membrane, ear canal and external ear normal. There is no impacted cerumen.      Left Ear: Hearing, tympanic membrane, ear canal and external ear normal. There is no impacted cerumen.      Nose: Mucosal edema, congestion and  rhinorrhea present.      Right Turbinates: Swollen.      Left Turbinates: Swollen.      Mouth/Throat:      Mouth: Mucous membranes are moist.      Pharynx: Oropharynx is clear. Uvula midline.   Eyes:      Extraocular Movements: Extraocular movements intact.      Pupils: Pupils are equal, round, and reactive to light.   Neurological:      Mental Status: He is alert.     Diagnostic nasal endoscopy:     He was seen in the room and identified. Pros and cons of the procedure were explained to the patient. The procedure and its alternatives were explained to the patient in lay terms. His questions were answered. His symptoms required a diagnostic endoscopic evaluation under local anesthesia. After obtaining informed consent from the patient, 3% Lidocaine with oxymetazoline spray was applied to each nostril. Then a flexible scopic exam was performed. Septum is slightly deviated to the right and then to the left. Ostiomeatal complexes are free of disease. No pus or polyp seen. Inferior turbinates are enlarged. Nasopharynx is normal. Rosenmüller fossa and torus tubarius are normal. Epiglottis, hypopharynx, false vocal folds are normal. No pooling in pyriform fossae. Vocal cords are mobile, showing post-glottic  edema otherwise normal. He tolerated the procedure well and left the room with no complications.      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 I will give him a topical nasal steroid spray and have an MRI to r/o any anterior skull base lesions. F/up as scheduled.

## 2022-03-28 ENCOUNTER — ANCILLARY PROCEDURE (OUTPATIENT)
Dept: MRI IMAGING | Facility: CLINIC | Age: 62
End: 2022-03-28
Attending: OTOLARYNGOLOGY
Payer: COMMERCIAL

## 2022-03-28 DIAGNOSIS — R43.0 LOSS OF SENSE OF SMELL: ICD-10-CM

## 2022-03-28 PROCEDURE — 70553 MRI BRAIN STEM W/O & W/DYE: CPT | Mod: GC | Performed by: RADIOLOGY

## 2022-03-28 PROCEDURE — A9585 GADOBUTROL INJECTION: HCPCS | Mod: JW | Performed by: RADIOLOGY

## 2022-03-28 RX ORDER — GADOBUTROL 604.72 MG/ML
10 INJECTION INTRAVENOUS ONCE
Status: COMPLETED | OUTPATIENT
Start: 2022-03-28 | End: 2022-03-28

## 2022-03-28 RX ADMIN — GADOBUTROL 8 ML: 604.72 INJECTION INTRAVENOUS at 11:07

## 2022-03-30 ENCOUNTER — TELEPHONE (OUTPATIENT)
Dept: OTOLARYNGOLOGY | Facility: CLINIC | Age: 62
End: 2022-03-30
Payer: COMMERCIAL

## 2022-03-30 NOTE — TELEPHONE ENCOUNTER
Phone call to pt, left message to call back clinic regarding recent MRI results. Explained a [employer] message will be sent as well.  Anita Moreno RN

## 2022-03-30 NOTE — TELEPHONE ENCOUNTER
----- Message from Tasia Hays MD sent at 3/29/2022  1:27 PM CDT -----  There are nonspecific olfactory bulb changes that may not explain the patient's symptoms otherwise within normal limits. F/up as scheduled.

## 2022-05-20 DIAGNOSIS — R43.0 LOSS OF SENSE OF SMELL: ICD-10-CM

## 2022-05-20 NOTE — TELEPHONE ENCOUNTER
Refill request for budesonide received from [facility].  Last appointment 3/9/22.  Medication refilled as requested.  Anita Moreno RN

## 2022-07-27 ENCOUNTER — TELEPHONE (OUTPATIENT)
Dept: DERMATOLOGY | Facility: CLINIC | Age: 62
End: 2022-07-27

## 2022-07-27 NOTE — TELEPHONE ENCOUNTER
Return in about 1 year (around 10/25/2022) for Skin Check    Olga toro Procedure   Dermatology, General and Plastic Surgery, Urology Specialties   [facility] [address] [address]

## 2022-09-07 ENCOUNTER — MYC MEDICAL ADVICE (OUTPATIENT)
Dept: FAMILY MEDICINE | Facility: CLINIC | Age: 62
End: 2022-09-07

## 2022-09-07 ENCOUNTER — OFFICE VISIT (OUTPATIENT)
Dept: FAMILY MEDICINE | Facility: CLINIC | Age: 62
End: 2022-09-07
Payer: COMMERCIAL

## 2022-09-07 VITALS
TEMPERATURE: 98.9 F | HEART RATE: 77 BPM | DIASTOLIC BLOOD PRESSURE: 78 MMHG | BODY MASS INDEX: 25.99 KG/M2 | SYSTOLIC BLOOD PRESSURE: 120 MMHG | OXYGEN SATURATION: 96 % | RESPIRATION RATE: 18 BRPM | WEIGHT: 172.7 LBS

## 2022-09-07 DIAGNOSIS — J20.9 ACUTE BRONCHITIS, UNSPECIFIED ORGANISM: Primary | ICD-10-CM

## 2022-09-07 PROCEDURE — 99213 OFFICE O/P EST LOW 20 MIN: CPT | Performed by: FAMILY MEDICINE

## 2022-09-07 RX ORDER — BENZONATATE 100 MG/1
100 CAPSULE ORAL 3 TIMES DAILY PRN
Qty: 42 CAPSULE | Refills: 0 | Status: SHIPPED | OUTPATIENT
Start: 2022-09-07 | End: 2023-01-16

## 2022-09-07 RX ORDER — ALBUTEROL SULFATE 90 UG/1
1-2 AEROSOL, METERED RESPIRATORY (INHALATION) EVERY 4 HOURS PRN
Qty: 18 G | Refills: 0 | Status: SHIPPED | OUTPATIENT
Start: 2022-09-07 | End: 2022-10-25

## 2022-09-07 RX ORDER — GUAIFENESIN 600 MG/1
1200 TABLET, EXTENDED RELEASE ORAL 2 TIMES DAILY
Qty: 60 TABLET | Refills: 0 | Status: SHIPPED | OUTPATIENT
Start: 2022-09-07 | End: 2022-10-25

## 2022-09-07 ASSESSMENT — PAIN SCALES - GENERAL: PAINLEVEL: NO PAIN (0)

## 2022-09-07 ASSESSMENT — ENCOUNTER SYMPTOMS: COUGH: 1

## 2022-09-07 NOTE — PROGRESS NOTES
Assessment & Plan     Acute bronchitis, unspecified organism  Symptomatic therapy suggested: push fluids, rest, use vaporizer or mist needed , use acetaminophen, ibuprofen as needed and Return office visit if symptoms persist or worsen.   - albuterol (PROAIR HFA/PROVENTIL HFA/VENTOLIN HFA) 108 (90 Base) MCG/ACT inhaler; Inhale 1-2 puffs into the lungs every 4 hours as needed for shortness of breath / dyspnea or wheezing  - benzonatate (TESSALON) 100 MG capsule; Take 1 capsule (100 mg) by mouth 3 times daily as needed for cough  - guaiFENesin (MUCINEX) 600 MG 12 hr tablet; Take 2 tablets (1,200 mg) by mouth 2 times daily        No follow-ups on file.    Rox Watson MD  [facility] BERENICE Bear is a 61 year old accompanied by his Self, presenting for the following health issues:  Cough    Symptoms started with sore throat, 09/02/2022, Negative COVID test 09/02/2022 and 09/07/2022.  RESPIRATORY SYMPTOMS      Duration: 4 days    Description  nasal congestion, rhinorrhea, sore throat and cough    Severity: moderate    Accompanying signs and symptoms: None    History (predisposing factors):  Travels     Precipitating or alleviating factors: None    Therapies tried and outcome:  rest and fluids antihistamine    Took over the counter lozenges for sore throat, Mucinex which was helpful. Nyquil 2 nights.  Cough    History of Present Illness       Reason for visit:  Cold  Symptom onset:  3-7 days ago  Symptoms include:  Cough and trouble breathing  Symptom intensity:  Moderate  Symptom progression:  Worsening  Had these symptoms before:  No    He eats 2-3 servings of fruits and vegetables daily.He consumes 1 sweetened beverage(s) daily.He exercises with enough effort to increase his heart rate 20 to 29 minutes per day.  He exercises with enough effort to increase his heart rate 4 days per week.   He is taking medications regularly.         Review of Systems   Respiratory: Positive for  cough.             Objective    /78 (BP Location: Left arm, Patient Position: Sitting, Cuff Size: Adult Regular)   Pulse 77   Temp 98.9  F (37.2  C) (Temporal)   Resp 18   Wt 78.3 kg (172 lb 11.2 oz)   SpO2 96%   BMI 25.99 kg/m    Body mass index is 25.99 kg/m .  Physical Exam   GENERAL: healthy, alert and no distress  EYES: Eyes grossly normal to inspection, PERRL and conjunctivae and sclerae normal  HENT: ear canals and TM's normal, nose and mouth without ulcers or lesions  NECK: no adenopathy, no asymmetry, masses, or scars and thyroid normal to palpation  RESP: lungs clear to auscultation - no rales, rhonchi or wheezes  CV: regular rate and rhythm, normal S1 S2, no S3 or S4, no murmur, click or rub, no peripheral edema and peripheral pulses strong  ABDOMEN: soft, nontender, no hepatosplenomegaly, no masses and bowel sounds normal  MS: no gross musculoskeletal defects noted, no edema

## 2022-09-07 NOTE — TELEPHONE ENCOUNTER
Responded in another encounter. I am closing this encounter.  Dear Chema,   I appreciate your prompt response. Please continue with the plans discussed during your clinic appointment. Reach out with questions or concerns.     Symptomatic therapy suggested: push fluids, rest, use vaporizer or mist needed , use acetaminophen, ibuprofen, antihistamine-decongestant of choice as needed, apply heat to sinuses as needed, and Return office visit if symptoms persist or worsen.    Last read by Serjio Shields at 3:39 PM on 9/7/2022.    Rox Watson MD on 9/7/2022 at 4:10 PM

## 2022-09-21 DIAGNOSIS — R39.16 BENIGN PROSTATIC HYPERPLASIA (BPH) WITH STRAINING ON URINATION: ICD-10-CM

## 2022-09-21 DIAGNOSIS — N40.1 BENIGN PROSTATIC HYPERPLASIA (BPH) WITH STRAINING ON URINATION: ICD-10-CM

## 2022-09-26 RX ORDER — TAMSULOSIN HYDROCHLORIDE 0.4 MG/1
CAPSULE ORAL
Qty: 90 CAPSULE | Refills: 3 | Status: SHIPPED | OUTPATIENT
Start: 2022-09-26 | End: 2023-01-16

## 2022-10-25 ENCOUNTER — OFFICE VISIT (OUTPATIENT)
Dept: FAMILY MEDICINE | Facility: CLINIC | Age: 62
End: 2022-10-25
Payer: COMMERCIAL

## 2022-10-25 VITALS
HEIGHT: 69 IN | OXYGEN SATURATION: 96 % | HEART RATE: 86 BPM | TEMPERATURE: 98.4 F | SYSTOLIC BLOOD PRESSURE: 119 MMHG | DIASTOLIC BLOOD PRESSURE: 76 MMHG | WEIGHT: 175.8 LBS | BODY MASS INDEX: 26.04 KG/M2 | RESPIRATION RATE: 25 BRPM

## 2022-10-25 DIAGNOSIS — Z48.02 VISIT FOR SUTURE REMOVAL: Primary | ICD-10-CM

## 2022-10-25 PROCEDURE — 99212 OFFICE O/P EST SF 10 MIN: CPT | Performed by: NURSE PRACTITIONER

## 2022-10-25 ASSESSMENT — PAIN SCALES - GENERAL: PAINLEVEL: NO PAIN (0)

## 2022-10-25 NOTE — PROGRESS NOTES
"  Assessment & Plan     Visit for suture removal  Watchful monitoring of wound. Home cares.   Return to clinic with any new or worsening symptoms, and as needed.                  BMI:   Estimated body mass index is 25.74 kg/m  as calculated from the following:    Height as of this encounter: 1.76 m (5' 9.29\").    Weight as of this encounter: 79.7 kg (175 lb 12.8 oz).   Weight management plan: Patient was referred to their PCP to discuss a diet and exercise plan.        Return in about 4 weeks (around 11/22/2022) for Physical Exam.    DAVIS Clifford CNP  [facility] BERENICE Bear is a 62 year old accompanied by his self, presenting for the following health issues:  Suture Removal      Suture Removal         ED/UC Followup:    Facility:  [facility]  Date of visit: 10/15/2022  Reason for visit: Dog scratch, left eyelid  Current Status: Needing stitches removed, 8 sutures placed due to dog scratch. Well healing, no concerns.     Tdap is UTD.       Review of Systems   Constitutional, HEENT, cardiovascular, pulmonary, gi and gu systems are negative, except as otherwise noted.      Objective    /76 (BP Location: Left arm, Patient Position: Sitting, Cuff Size: Adult Regular)   Pulse 86   Temp 98.4  F (36.9  C) (Temporal)   Resp 25   Ht 1.76 m (5' 9.29\")   Wt 79.7 kg (175 lb 12.8 oz)   SpO2 96%   BMI 25.74 kg/m    Body mass index is 25.74 kg/m .  Physical Exam   GENERAL: healthy, alert and no distress  EYES: Eyes grossly normal to inspection, PERRL and conjunctivae and sclerae normal  SKIN: healed laceration to left upper eyelid, across crease  NEURO: Normal strength and tone, sensory exam grossly normal and mentation intact    Procedure:  8 sutures removed with forceps and suture scissors without incident. Pt. Tolerated well.   No bandage applied.                 "

## 2022-11-18 ENCOUNTER — TELEPHONE (OUTPATIENT)
Dept: FAMILY MEDICINE | Facility: CLINIC | Age: 62
End: 2022-11-18

## 2022-11-18 DIAGNOSIS — E78.5 HYPERLIPIDEMIA LDL GOAL <130: ICD-10-CM

## 2022-11-22 ENCOUNTER — MYC MEDICAL ADVICE (OUTPATIENT)
Dept: FAMILY MEDICINE | Facility: CLINIC | Age: 62
End: 2022-11-22

## 2022-11-22 RX ORDER — SIMVASTATIN 40 MG
TABLET ORAL
Qty: 30 TABLET | Refills: 0 | Status: SHIPPED | OUTPATIENT
Start: 2022-11-22 | End: 2022-12-22

## 2022-11-22 NOTE — TELEPHONE ENCOUNTER
"Pending Prescriptions:                       Disp   Refills    simvastatin (ZOCOR) 40 MG tablet [Pharmacy*90 tab*3        Sig: TAKE ONE TABLET BY MOUTH AT BEDTIME    Routing refill request to provider for review/approval because:  Prescription is over 13 months old.    Requested Prescriptions   Pending Prescriptions Disp Refills    simvastatin (ZOCOR) 40 MG tablet [Pharmacy Med Name: SIMVASTATIN 40MG TABS] 90 tablet 3     Sig: TAKE ONE TABLET BY MOUTH AT BEDTIME       Statins Protocol Failed - 11/18/2022  4:08 AM        Failed - LDL on file in past 12 months     Recent Labs   Lab Test 10/14/21  0744   *             Passed - No abnormal creatine kinase in past 12 months     No lab results found.             Passed - Recent (12 mo) or future (30 days) visit within the authorizing provider's specialty     Patient has had an office visit with the authorizing provider or a provider within the authorizing providers department within the previous 12 mos or has a future within next 30 days. See \"Patient Info\" tab in inbasket, or \"Choose Columns\" in Meds & Orders section of the refill encounter.              Passed - Medication is active on med list        Passed - Patient is age 18 or older           simvastatin (ZOCOR) 40 MG tablet 90 tablet 3 10/14/2021  No   Sig: TAKE 1 TABLET (40 MG) BY MOUTH AT BEDTIME   Sent to pharmacy as: Simvastatin 40 MG Oral Tablet (ZOCOR)   Class: E-Prescribe   Order: [id number]   E-Prescribing Status: Receipt confirmed by pharmacy (10/14/2021  7:37 AM CDT)     Hayley Ortiz RN on 11/22/2022 at 12:08 PM        "

## 2022-11-22 NOTE — TELEPHONE ENCOUNTER
Staff sent [employer] message to notify patient of required appointment and fasting labs for further medication refills.

## 2022-11-22 NOTE — TELEPHONE ENCOUNTER
Pt overdue for visit.   Last annual Oct 2022.   Refilled 30d  Please schedule pt for annual exam in person- need fasting labs.    Debbie Whalen PA-C

## 2022-12-22 DIAGNOSIS — E78.5 HYPERLIPIDEMIA LDL GOAL <130: ICD-10-CM

## 2022-12-22 DIAGNOSIS — N20.0 KIDNEY STONE: ICD-10-CM

## 2022-12-22 DIAGNOSIS — R82.994 HYPERCALCIURIA: ICD-10-CM

## 2022-12-22 RX ORDER — HYDROCHLOROTHIAZIDE 50 MG/1
TABLET ORAL
Qty: 30 TABLET | Refills: 0 | Status: SHIPPED | OUTPATIENT
Start: 2022-12-22 | End: 2023-01-16

## 2022-12-22 RX ORDER — SIMVASTATIN 40 MG
TABLET ORAL
Qty: 30 TABLET | Refills: 0 | Status: SHIPPED | OUTPATIENT
Start: 2022-12-22 | End: 2023-01-16

## 2022-12-22 NOTE — TELEPHONE ENCOUNTER
Staff sent [employer] message to notify patient of required appointment for further medication refills.

## 2022-12-22 NOTE — TELEPHONE ENCOUNTER
"Pending Prescriptions:                       Disp   Refills    simvastatin (ZOCOR) 40 MG tablet [Pharmacy*30 tab*0        Sig: TAKE ONE TABLET BY MOUTH AT BEDTIME    hydrochlorothiazide (HYDRODIURIL) 50 MG ta*90 tab*3        Sig: TAKE ONE TABLET BY MOUTH EVERY DAY        Routing refill request to provider for review/approval because:  Patient has an appointment in 3 weeks with KP.       Diuretics (Including Combos) Protocol Failed    Rerun Protocol (12/22/2022 3:50 AM)    Normal serum creatinine on file in past 12 months      Recent Labs   Lab Test 10/14/21  0744   CR 0.88        Normal serum potassium on file in past 12 months      Recent Labs   Lab Test 10/14/21  0744   POTASSIUM 3.9                 Normal serum sodium on file in past 12 months      Recent Labs   Lab Test 10/14/21  0744           Blood pressure under 140/90 in past 12 months      BP Readings from Last 3 Encounters:   10/25/22 119/76   09/07/22 120/78   10/14/21 126/76           Recent (12 mo) or future (30 days) visit within the authorizing provider's specialty  Patient has had an office visit with the authorizing provider or a provider within the authorizing providers department within the previous 12 mos or has a future within next 30 days. See \"Patient Info\" tab in inbasket, or \"Choose Columns\" in Meds & Orders section of the refill encounter.         Medication is active on med list      Patient is age 18 or older          "

## 2022-12-24 DIAGNOSIS — E78.5 HYPERLIPIDEMIA LDL GOAL <130: ICD-10-CM

## 2022-12-26 RX ORDER — SIMVASTATIN 40 MG
TABLET ORAL
Qty: 30 TABLET | Refills: 0 | OUTPATIENT
Start: 2022-12-26

## 2022-12-26 NOTE — TELEPHONE ENCOUNTER
Routing refill request to team for review/approval because:  Yuli given x1 and patient did not follow up, Please call and help schedule.

## 2023-01-04 NOTE — NURSING NOTE
Chief Complaint  Germline BRCA2 and GEORGES mutations, stage I ([id number]) papillary thyroid cancer, bilateral DCIS, stage I left ([id number]) clear-cell renal cell carcinoma, stage IIB typical carcinoid of the left lung ([id number]), stage IA1 (qX3oC6Dr)  left lower lobe non-small cell lung cancer (adenocarcinoma with lipidic features), stage IA2 (zU3zgH1T4) right lower lobe non-small cell lung cancer (adenocarcinoma with lipidic growth pattern), melanoma right heel stage IA (qP6qQnY3), anemia    Subjective        History of Present Illness  Patient returns in follow-up after recent hospitalization and stay in rehab, discharged on 12/23/2022 with new diagnosis of glioblastoma multiforme.  She is continuing at this point on concurrent chemoradiation with low-dose Temodar 75 mg/m² (165 mg) daily and will complete treatment on 1/16/2022.  She has been tolerating treatment reasonably well, does note some ongoing fatigue.  She has improved overall in terms of her left upper and lower extremity weakness.  She is in a wheelchair today but is able to walk with use of a Rollator at home.  She does have home occupational therapy and physical therapy as well as speech therapy ongoing.  She is not experiencing any significant nausea.  Her glucose has been quite elevated in the 200-400 range, continuing on dexamethasone 4 mg every 8 hours.      Objective   Vital Signs:   /81   Pulse 82   Temp 97.1 °F (36.2 °C) (Temporal)   Resp 16   Ht 165.1 cm (65\")   Wt 102 kg (224 lb 13.9 oz)   SpO2 95%   BMI 37.42 kg/m²     Physical Exam  Constitutional:       Appearance: She is well-developed.   Eyes:      Conjunctiva/sclera: Conjunctivae normal.   Neck:      Thyroid: No thyromegaly.   Cardiovascular:      Rate and Rhythm: Normal rate and regular rhythm.      Heart sounds: No murmur heard.    No friction rub. No gallop.   Pulmonary:      Effort: No respiratory distress.      Breath sounds: Normal breath sounds.   Abdominal:       Serjio Shields's goals for this visit include:   Chief Complaint   Patient presents with     Skin Check     no concerns today. waist up skin check.        He requests these members of his care team be copied on today's visit information:     PCP: Sony Kidd    Referring Provider:  Referred Self, MD  No address on file    There were no vitals taken for this visit.    Do you need any medication refills at today's visit? no     General: Bowel sounds are normal. There is no distension.      Palpations: Abdomen is soft.      Tenderness: There is no abdominal tenderness.   Lymphadenopathy:      Head:      Right side of head: No submandibular adenopathy.      Cervical: No cervical adenopathy.      Upper Body:      Right upper body: No supraclavicular adenopathy.      Left upper body: No supraclavicular adenopathy.   Skin:     General: Skin is warm and dry.      Findings: No rash.   Neurological:      Mental Status: She is alert and oriented to person, place, and time.      Cranial Nerves: No cranial nerve deficit.      Motor: No abnormal muscle tone.      Comments: Mild to moderate left upper and lower extremity weakness   Psychiatric:         Behavior: Behavior normal.            Result Review : Reviewed CBC, CMP from today.  Reviewed multiple records from hospitalization as outlined above and below.       Assessment and Plan  1. Glioblastoma multiforme  · Patient presented with falls and confusion, left upper and lower extremity weakness  · MRI brain 11/22/2022 with 3.2 cm rim-enhancing right supratentorial mass in the right thalamus and internal capsule with mass-effect and leftward midline shift  · Initiated dexamethasone 4 mg p.o. every 8 hours seizure prophylaxis with Keppra 500 mg twice daily  · Patient was seen by neurosurgery and was not a candidate for resection due to location.  · CT chest abdomen pelvis 11/24/2022 showed no evidence of source for potential metastatic disease  · Stereotactic brain biopsy 11/28/2022 with glioblastoma, IDH1 R132H negative, grade 4, MGM T promoter unmethylated  · On 12/12/2022, patient initiated concurrent chemoradiation with low-dose Temodar 75 mg/m² (165 mg) daily  · Patient is continuing on concurrent chemoradiation for glioblastoma, tolerating treatment relatively well.  She has some ongoing difficulty with fatigue.  Overall, the patient has improved in terms of her left-sided weakness, has been  able to ambulate at home with the use of a Rollator although does continue with some significant limitations due to her left-sided weakness and some cognitive effects.  I did discuss with the patient and her father today plans to continue concurrent chemoradiation which will be completed on 1/16/2022.  2 weeks after we will perform MRI brain to assess status of disease and I will see her shortly thereafter.  We will discuss potential initiation of maintenance Temodar pending the MRI result.  2. BRCA2 mutation (c.5073dupA) and GEORGES mutation (c.5073dup):  · Strong family history of malignancy with a mother who had thyroid cancer and also had breast cancer at age 73 with subsequent liver metastases.  She was found to be BRCA2 positive and prompted the patient's own testing.  The patient's maternal grandmother had ovarian cancer in her mid 80s, maternal grandfather and paternal grandfather both had leukemia of unknown type at an older age.  Her maternal aunt had colon cancer over the age of 50, maternal aunt had breast cancer in her 80s, and her father had cholangiocarcinoma.    · The patient underwent testing on 7/27/2016 showing positive BRCA2 mutation (c.[id number])   · The patient did undergo KAVYA/BSO in 1992 secondary to hemorrhage.  · The patient did undergo bilateral mastectomies 1/26/2017 with findings of bilateral DCIS (discussed below).  · Patient has undergone previous colonoscopy on 11/28/2017.  · Given the unusual nature of the patient's malignancies, referral to genetics clinic for panel testing performed 11/10/2020 with re-identification of BRCA2 mutation ([id number].5073dupA) and new identification of GEORGES mutation (c.5073dup).  · Previous plan to continue surveillance with repeat colonoscopy at a 5-year interval that would have been due late 2022.  Plans subsequently deferred due to subsequent diagnosis of glioblastoma multiforme.  2. Stage I (mG8dMqTe) papillary thyroid cancer:  · Status post total thyroidectomy  with Dr. Maher in [place] on 10/15/2010.  Pathology showed papillary thyroid cancer involving the left thyroid and isthmus, multifocal with 2 areas measuring 0.9 and 1.2 cm.  No evidence of capsular invasion, no extrathyroidal extension, no lymphovascular invasion, negative margins.  · Postsurgical scan showed uptake in the thyroid bed and the patient underwent treatment with I-131.    · She has had no evidence of recurrent disease.    · Her subsequent hypothyroidism has been managed by endocrinology (Dr. Subhash Michael) in [place], currently receiving levothyroxine 150 mcg daily.  · The patient has continued routine follow-up with endocrinology, was seen last on 8/8/2022  3. Bilateral DCIS  · As above, patient has underlying BRCA2 mutation  · 8/5/2016 was found to have a right breast intraductal papilloma with fibrocystic change and microcalcifications with usual ductal hyperplasia and columnar cell change in 3 separate locations on biopsy.  · On 8/24/2016 she underwent excisional biopsy of an intraductal papilloma from the right breast.    · She subsequently underwent on 1/26/2017 bilateral mastectomy.  On the right there was a large intraductal papilloma with usual ductal hyperplasia, atypical hyperplasia, DCIS measuring 3 mm which was low-grade, ER positive (98%), WA positive (85%).  On the left there were multiple intraductal papillomas with atypical ductal hyperplasia.  There was DCIS measuring 6 mm, low-grade, ER positive (99%), WA positive (98%).  4. Stage I (eV0rGqI0) clear-cell renal cell carcinoma:  · Incidental finding on CT scan 1/15/2020 of enhancing left renal lesions x2, largest 2.4 cm  · Resection with Dr. Pool ([facility]) on 5/15/2020 with left partial nephrectomy.  Pathology showed clear cell renal cell carcinoma, Jeanne grade 2, 2 foci measuring 1.5 and 2.1 cm.  The renal parenchymal margin was focally positive.  · Patient reports that Dr. Pool felt that he required  additional margin tissue in the area of focal positivity and did not recommend re-resection.  · CT 8/17/2022 showed no evidence of recurrent disease  · CT chest abdomen pelvis 11/25/2022 with stable chronic findings.  · Patient had been undergoing every 6-month monitoring of CT scans for surveillance.  We will not plan further routine CT monitoring in light of the diagnosis of glioblastoma.  5. Stage IIB typical carcinoid of the left lung (bQ3mX1K5):  · PET scan 12/13/2017 with hypermetabolic 2.5 cm left lower lobe nodule with SUV 5.7.    · CT-guided biopsy on 1/9/2018 revealed a left lower lobe carcinoid with spindle cell features, no necrosis, no mitoses.  · Notation of normal chromogranin A 1/23/2018 of 35  · Follow-up CT on 1/24/2018 showed multiple bilateral pulmonary nodules including a 2.1 cm left lower lobe nodule (previously 2.6), 7 mm left lower lobe nodule, 1 cm right lower lobe nodule, right adrenal 1.5 cm nodule consistent with adenoma, and hepatic cysts.    · On 2/28/2018, the patient underwent a left lower lobectomy.  Pathology revealed a 2.3 cm left upper lobe (hilar) carcinoid, typical with spindle cell features, Ki-67 of 3.68%.  There were 2 of 8 peribronchial lymph nodes involved with carcinoid with lymphatic invasion, stage IIB (cQ1tpO2O9).  There was also evidence of adenocarcinoma (see below).  6. Stage IA1 (hD4hC6Kn)  left lower lobe non-small cell lung cancer (adenocarcinoma with lipidic features):  · The patient is a never smoker  · PET scan 12/13/2017 with hypermetabolic 2.5 cm left lower lobe nodule with SUV 5.7.    · CT-guided biopsy on 1/9/2018 revealed a left lower lobe carcinoid with spindle cell features, no necrosis, no mitoses.    · Follow-up CT on 1/24/2018 showed multiple bilateral pulmonary nodules including a 2.1 cm left lower lobe nodule (previously 2.6), 7 mm left lower lobe nodule, 1 cm right lower lobe nodule, right adrenal 1.5 cm nodule consistent with adenoma, and hepatic  cysts.    · On 2/28/2018, the patient underwent a left lower lobectomy.  Pathology revealed a 2.3 cm left upper lobe (hilar) carcinoid, typical with spindle cell features, Ki-67 of 3.68%.  There were 2 of 8 peribronchial lymph nodes involved with carcinoid with lymphatic invasion, stage IIB (tQ7uvO1F9).  There was a 0.9 cm adenocarcinoma, well differentiated with lipidic features, no visceral pleural invasion, bronchoalveolar atypical adenomatous hyperplasia at the parenchymal margin and 1 microscopic focus (less than 1 mm).  0/8 lymph nodes involved with adenocarcinoma. PDL1 <1% TPS, positive EGF receptor mutation (exon 19 deletion, [id number]), negative ALK/ROS1 rearrangement, negative BRAF, ER negative (2%), WA 0, HER-2/laura 1+, Ki-67 3%. Stage IA1 (aW9ytQ7Y0).  · Patient was placed on Femara following surgery by Dr. Juana Pelayo.  Femara subsequently discontinued in early August 2020.  · Subsequent follow-up scans with stable findings until scan on 1/15/2020 noted 2 enhancing left renal lesions, largest 2.4 cm and there was also an enlarging right lower lobe nodule up to 1.0 x 1.2 cm.  In the interval, patient did undergo resection of renal cell carcinoma (discussed above).    · CT scan on 7/1/2020 showed slow increase in right lower lobe nodule up to 1.2 cm.  Felt to represent new primary lung cancer (see below).   7. Stage IA2 (hM7cvZ3S0) right lower lobe non-small cell lung cancer (adenocarcinoma with lipidic growth pattern):   · CT scan on 7/1/2020 showed slow increase in right lower lobe nodule up to 1.2 cm.  · CT-guided biopsy of the right lower lobe nodule on 7/31/2020 with adenocarcinoma with bland lipidic growth pattern of pulmonary origin (TTF-1 and CK7 positive). PDL1 TPS <1%, EGFR mutated, exon 20 insertion (possibly indicating lack of response to TKI's). ALK/ROS1 rearrangement negative and BRAF V600 mutation negative.  · Staging MRI brain 8/12/2020 with no evidence of metastatic  disease  · Staging PET scan 8/12/2020 with no significant activity in the biopsied lesion 1.2 cm right lower lobe (SUV 1.5), no evidence of hilar, mediastinal uptake nor distant metastatic disease.  · Given the difference in EGFR mutation between the patient's 2 lung cancers, it is felt that she has 2 separate primary lesions.    · Right VATS with anterior basal segmentectomy on 10/9/2020 with pathology showing invasive well to moderately differentiated adenocarcinoma with acinar predominant growth measuring 1.4 cm, negative margins, no pleural or lymphovascular invasion, negative lymph nodes x5.  · Follow-up CT chest 3/8/2021 showed postoperative change, no evidence of disease recurrence.    · CT 8/17/2022 showed no evidence of recurrent disease.   · CT chest abdomen pelvis 11/25/2022 with stable chronic findings.  · Patient had been undergoing every 6-month monitoring of CT scans for surveillance.  We will not plan further routine CT monitoring in light of the diagnosis of glioblastoma.  8. Melanoma right heel stage IA (lE9fCaD6)  · Patient underwent excision of right heel melanoma 6/4/2021, 0.6 mm superficial spreading melanoma with margin positive for melanoma in situ.  · Wide local excision 6/30/2021 with no residual melanoma identified  · Patient has had difficulty with healing of the wound and has required 3 separate skin grafts, the most recent performed 4 weeks ago with ongoing slow healing.  · The patient's wound in the right heel did finally heal in late 2021.    · Patient had been continuing follow-up with dermatology every 3 months however this is likely not necessary in the setting of recent diagnosis of glioblastoma.  9. Incidental CT findings  · Notation of a number of incidental CT findings on scan from 8/17/2022 including 1.6 cm right adrenal nodule, C7 focal osteolysis, both unchanged compared to 3/8/2021 and apparent benign findings.    · Notation on CT scan 8/17/2022 of short segment narrowing  of the proximal left internal carotid artery resulting in moderate stenosis.  Findings were not well evaluated.    · Carotid ultrasound 9/1/2022 was normal  · CT chest abdomen pelvis with stable findings on 11/25/2022.  No plans for any further routine CT monitoring of these issues.  10. Anemia:  · Patient has undergone previous colonoscopy on 11/28/2017.  · Hemoglobin in high 10 to low 11 range with low normal MCV  · Anemia evaluation 8/20/2020 with iron 44, ferritin 119.3, iron saturation 13%, TIBC 351, folate 12.2, B12 392.  · Unclear whether patient may have anemia secondary to chronic disease.  · With low iron saturation, initiated oral iron daily on 9/15/2020.  Patient however did not begin oral iron until 11/11/2020.  · Labs on 11/11/2020 with hemoglobin 11.4, iron studies showing iron 54, ferritin 112, iron saturation 14%, TIBC 395.  · Labs on 1/7/2021 with hemoglobin 11.9, iron studies with iron 50, ferritin 109.5, iron saturation 12%, TIBC 420.  Patient with significant GI side effects from oral iron with diarrhea, iron discontinued.  · Labs on 9/14/2021 showed hemoglobin that declined to 11.4 of unclear etiology.  Additional labs showed iron 44, ferritin 137.6, iron saturation 13 %, TIBC 346, folate 11.3, B12 394.   · Labs on 8/24/2022 showed hemoglobin normal at 12.0.  Labs show iron 46, ferritin 106, iron saturation 13%, TIBC 351.    · Hemoglobin currently stable at 12.2  9. Depression  · The patient was experiencing significant exacerbation of depressive symptoms in fall 2021 as she was coping with her mother's illness with metastatic breast cancer.  Patient's mother was living with her on hospice care for a period of time and did pass away in late 2021.  · Patient was referred back to supportive oncology clinic which had been very helpful for her  · Patient had been receiving Lexapro, dose decreased from 10 down to 5 mg daily which was better tolerated  · Patient with recent diagnosis of glioblastoma,  scheduled to be seen by supportive oncology on 1/11/2023.  At this time, patient appears to be coping with the situation reasonably well.  10. PCP prophylaxis  · Patient continuing on Bactrim DS 1 p.o. Monday Wednesday Friday  · With persistent elevation in ALT at 243, concerned that this is related to Bactrim and we will discontinue Bactrim and transition to oral dapsone 100 mg daily.  11. GI prophylaxis  · Continue Protonix 40 mg daily  12. Seizure prophylaxis  · Continue Keppra 500 mg twice daily  13. Elevated LFTs  · LFTs prior to hospitalization in November 2022 were normal  · Significant increase in transaminases noted on 12/8/2022 with ALT 1049, , normal total bilirubin 0.6  · Viral hepatitis panel negative 12/8/2022, negative GEMMA and smooth muscle antibody 12/9/2022  · Gradual improvement in LFT's.  · Today, ALT remains elevated and stable at 243, AST normal at 27, normal bilirubin 0.3. Unclear if persistent elevation in LFT's may be related to bactrim, will discontinue bactrim and switch to dapsone as above. Continue to monitor LFT's.     Plan:  1. Continue current chemoradiation with low-dose Temodar 75 mg/m² (165 mg) daily.  Patient will complete treatment on 1/16/2022  2. Decrease dexamethasone dose from 4 mg every 8 hours down to 4 mg twice daily  3. Discontinue prophylactic Bactrim due to potential effect on LFTs  4. Begin dapsone 100 mg daily for PCP prophylaxis  5. Continue Keppra 500 mg twice daily for seizure prophylaxis  6. Continue Protonix 40 mg daily for GI prophylaxis  7. Patient is continuing on home physical, occupational, and speech therapy  8. Patient is scheduled for follow-up in supportive oncology clinic on 1/11/2022.  9. On 1/9/2022 nurse practitioner visit with CBC, CMP  10. On 1/16/2022 nurse practitioner visit with CBC, CMP.  Patient will complete concurrent chemoradiation at that time and will discontinue Temodar.  11. On 1/30/2022 MRI brain  12. MD visit later that week  with CBC, CMP and we will review MRI brain results and discuss potential pursuit of subsequent maintenance Temodar.    Patient continues on high risk medication requiring intensive monitoring.    I did spend 50 minutes in total time caring for the patient today, time spent in review of records, face-to-face consultation, coordination of care, placement of orders, completion of documentation.

## 2023-01-09 ASSESSMENT — ENCOUNTER SYMPTOMS
HEADACHES: 0
DYSURIA: 0
CHILLS: 0
SHORTNESS OF BREATH: 0
NAUSEA: 0
SORE THROAT: 0
ARTHRALGIAS: 0
CONSTIPATION: 0
HEMATURIA: 0
ABDOMINAL PAIN: 0
FREQUENCY: 0
MYALGIAS: 0
HEMATOCHEZIA: 0
DIARRHEA: 0
PARESTHESIAS: 0
PALPITATIONS: 0
DIZZINESS: 0
COUGH: 0
EYE PAIN: 0
FEVER: 0
JOINT SWELLING: 0
WEAKNESS: 0
HEARTBURN: 0
NERVOUS/ANXIOUS: 0

## 2023-01-16 ENCOUNTER — OFFICE VISIT (OUTPATIENT)
Dept: FAMILY MEDICINE | Facility: CLINIC | Age: 63
End: 2023-01-16
Attending: NURSE PRACTITIONER
Payer: COMMERCIAL

## 2023-01-16 VITALS
BODY MASS INDEX: 26.66 KG/M2 | HEIGHT: 68 IN | DIASTOLIC BLOOD PRESSURE: 83 MMHG | HEART RATE: 51 BPM | SYSTOLIC BLOOD PRESSURE: 127 MMHG | WEIGHT: 175.9 LBS | OXYGEN SATURATION: 97 % | TEMPERATURE: 98.1 F

## 2023-01-16 DIAGNOSIS — Z00.00 ROUTINE GENERAL MEDICAL EXAMINATION AT A HEALTH CARE FACILITY: Primary | ICD-10-CM

## 2023-01-16 DIAGNOSIS — R82.994 HYPERCALCIURIA: ICD-10-CM

## 2023-01-16 DIAGNOSIS — Z12.5 SCREENING FOR PROSTATE CANCER: ICD-10-CM

## 2023-01-16 DIAGNOSIS — Z87.442 HISTORY OF KIDNEY STONES: ICD-10-CM

## 2023-01-16 DIAGNOSIS — R39.16 BENIGN PROSTATIC HYPERPLASIA (BPH) WITH STRAINING ON URINATION: ICD-10-CM

## 2023-01-16 DIAGNOSIS — Z80.0 FAMILY HISTORY OF COLON CANCER: ICD-10-CM

## 2023-01-16 DIAGNOSIS — E78.5 HYPERLIPIDEMIA LDL GOAL <130: ICD-10-CM

## 2023-01-16 DIAGNOSIS — N40.1 BENIGN PROSTATIC HYPERPLASIA (BPH) WITH STRAINING ON URINATION: ICD-10-CM

## 2023-01-16 LAB
ALBUMIN SERPL-MCNC: 3.7 G/DL (ref 3.4–5)
ALP SERPL-CCNC: 68 U/L (ref 40–150)
ALT SERPL W P-5'-P-CCNC: 28 U/L (ref 0–70)
ANION GAP SERPL CALCULATED.3IONS-SCNC: 6 MMOL/L (ref 3–14)
AST SERPL W P-5'-P-CCNC: 15 U/L (ref 0–45)
BILIRUB SERPL-MCNC: 0.6 MG/DL (ref 0.2–1.3)
BUN SERPL-MCNC: 10 MG/DL (ref 7–30)
CALCIUM SERPL-MCNC: 9.2 MG/DL (ref 8.5–10.1)
CHLORIDE BLD-SCNC: 103 MMOL/L (ref 94–109)
CHOLEST SERPL-MCNC: 179 MG/DL
CO2 SERPL-SCNC: 29 MMOL/L (ref 20–32)
CREAT SERPL-MCNC: 0.91 MG/DL (ref 0.66–1.25)
FASTING STATUS PATIENT QL REPORTED: YES
GFR SERPL CREATININE-BSD FRML MDRD: >90 ML/MIN/1.73M2
GLUCOSE BLD-MCNC: 98 MG/DL (ref 70–99)
HDLC SERPL-MCNC: 51 MG/DL
LDLC SERPL CALC-MCNC: 93 MG/DL
NONHDLC SERPL-MCNC: 128 MG/DL
POTASSIUM BLD-SCNC: 4.1 MMOL/L (ref 3.4–5.3)
PROT SERPL-MCNC: 6.9 G/DL (ref 6.8–8.8)
PSA SERPL-MCNC: 1.79 UG/L (ref 0–4)
SODIUM SERPL-SCNC: 138 MMOL/L (ref 133–144)
TRIGL SERPL-MCNC: 177 MG/DL

## 2023-01-16 PROCEDURE — 36415 COLL VENOUS BLD VENIPUNCTURE: CPT | Performed by: PHYSICIAN ASSISTANT

## 2023-01-16 PROCEDURE — 80061 LIPID PANEL: CPT | Performed by: PHYSICIAN ASSISTANT

## 2023-01-16 PROCEDURE — 99396 PREV VISIT EST AGE 40-64: CPT | Performed by: PHYSICIAN ASSISTANT

## 2023-01-16 PROCEDURE — 99214 OFFICE O/P EST MOD 30 MIN: CPT | Mod: 25 | Performed by: PHYSICIAN ASSISTANT

## 2023-01-16 PROCEDURE — 80053 COMPREHEN METABOLIC PANEL: CPT | Performed by: PHYSICIAN ASSISTANT

## 2023-01-16 PROCEDURE — G0103 PSA SCREENING: HCPCS | Performed by: PHYSICIAN ASSISTANT

## 2023-01-16 RX ORDER — HYDROCHLOROTHIAZIDE 50 MG/1
50 TABLET ORAL DAILY
Qty: 90 TABLET | Refills: 3 | Status: SHIPPED | OUTPATIENT
Start: 2023-01-16 | End: 2024-01-30

## 2023-01-16 RX ORDER — SIMVASTATIN 40 MG
40 TABLET ORAL AT BEDTIME
Qty: 90 TABLET | Refills: 3 | Status: SHIPPED | OUTPATIENT
Start: 2023-01-16 | End: 2024-02-07

## 2023-01-16 RX ORDER — TAMSULOSIN HYDROCHLORIDE 0.4 MG/1
0.4 CAPSULE ORAL DAILY
Qty: 90 CAPSULE | Refills: 3 | Status: SHIPPED | OUTPATIENT
Start: 2023-01-16 | End: 2024-01-19

## 2023-01-16 ASSESSMENT — ENCOUNTER SYMPTOMS
HEMATOCHEZIA: 0
SORE THROAT: 0
HEADACHES: 0
NERVOUS/ANXIOUS: 0
CHILLS: 0
ABDOMINAL PAIN: 0
DIARRHEA: 0
DYSURIA: 0
FEVER: 0
MYALGIAS: 0
PARESTHESIAS: 0
HEARTBURN: 0
HEMATURIA: 0
PALPITATIONS: 0
COUGH: 0
FREQUENCY: 0
WEAKNESS: 0
EYE PAIN: 0
SHORTNESS OF BREATH: 0
CONSTIPATION: 0
DIZZINESS: 0
JOINT SWELLING: 0
ARTHRALGIAS: 0
NAUSEA: 0

## 2023-01-16 ASSESSMENT — PAIN SCALES - GENERAL: PAINLEVEL: NO PAIN (0)

## 2023-01-16 NOTE — PROGRESS NOTES
SUBJECTIVE:   CC: Chema is an 62 year old who presents for preventative health visit.     Healthy Habits:     Getting at least 3 servings of Calcium per day:  Yes    Bi-annual eye exam:  Yes    Dental care twice a year:  Yes    Sleep apnea or symptoms of sleep apnea:  None    Diet:  Vegetarian/vegan    Frequency of exercise:  2-3 days/week    Duration of exercise:  15-30 minutes    Taking medications regularly:  Yes    Medication side effects:  None    PHQ-2 Total Score: 0    Additional concerns today:  No    Routine Health Maintenance:  Immunizations Due for: none-o up to date  Colonoscopy: last report on file from 02/29/2016- he had one at [facility] he thinks in 2021 . F/U in 5 yrs. Fam hx of colon cancer: yes- MOTHER   PSA screening: Last: 2021. Hx of abnormal: no. Fam hx of prostate cancer: mat gpa  Sexually active: yes  Fasting: YES  Diabetes screening: normal prior    Hydrochlorothiazide for kidney stone prevention. None for last 10 years. /83 here today. Normal prior kidney monitoring labs.     BPH- on tamsulosin- great at first , better than it was. Emptying all the way. Almost never up overnight to void.     Hyperlipidemia Follow-Up- doing well  Lifting weights 3d per week. Not doing any cardio in winter. Rides bike/cycles in summer.     Are you regularly taking any medication or supplement to lower your cholesterol?   Yes- SIMVASTATIN     Are you having muscle aches or other side effects that you think could be caused by your cholesterol lowering medication?  No      Today's PHQ-2 Score:   PHQ-2 ( 1999 [employer]) 1/9/2023   Q1: Little interest or pleasure in doing things 0   Q2: Feeling down, depressed or hopeless 0   PHQ-2 Score 0   PHQ-2 Total Score (12-17 Years)- Positive if 3 or more points; Administer PHQ-A if positive -   Q1: Little interest or pleasure in doing things Not at all   Q2: Feeling down, depressed or hopeless Not at all   PHQ-2 Score 0       Have you ever done Advance Care Planning? (For  example, a Health Directive, POLST, or a discussion with a medical provider or your loved ones about your wishes): Yes, patient states has an Advance Care Planning document and will bring a copy to the clinic.    Social History     Tobacco Use     Smoking status: Never     Smokeless tobacco: Never   Substance Use Topics     Alcohol use: Yes     Comment: rare use       Alcohol Use 1/9/2023   Prescreen: >3 drinks/day or >7 drinks/week? No   Prescreen: >3 drinks/day or >7 drinks/week? -       Last PSA:   PSA   Date Value Ref Range Status   11/23/2020 1.96 0 - 4 ug/L Final     Comment:     Assay Method:  Chemiluminescence using Siemens Vista analyzer     Prostate Specific Antigen Screen   Date Value Ref Range Status   10/14/2021 1.99 0.00 - 4.00 ug/L Final       Reviewed orders with patient. Reviewed health maintenance and updated orders accordingly - Yes  Lab work is in process  Labs reviewed in EPIC  BP Readings from Last 3 Encounters:   01/16/23 127/83   10/25/22 119/76   09/07/22 120/78    Wt Readings from Last 3 Encounters:   01/16/23 79.8 kg (175 lb 14.4 oz)   10/25/22 79.7 kg (175 lb 12.8 oz)   09/07/22 78.3 kg (172 lb 11.2 oz)                  Patient Active Problem List   Diagnosis     Kidney stone     Colon polyp     Family history of colon cancer     Family history of prostate cancer     Advanced directives, counseling/discussion     Hyperlipidemia LDL goal <130     History of pulmonary embolism     Seborrheic keratosis     Freckled skin     Elevated blood pressure reading     Posterior vitreous detachment of left eye     History of radial keratotomy     Past Surgical History:   Procedure Laterality Date     COLONOSCOPY  1/16     CYSTOSCOPY, URETEROSCOPY, COMBINED Left 6/24/2015    Procedure: COMBINED CYSTOSCOPY, URETEROSCOPY;  Surgeon: Larry Elias MD;  Location: [facility] OR     EYE SURGERY  6/90    RK on both eyes     SURGICAL HISTORY OF -       meatus dilation in childhood       Social History      Tobacco Use     Smoking status: Never     Smokeless tobacco: Never   Substance Use Topics     Alcohol use: Yes     Comment: rare use     Family History   Problem Relation Age of Onset     C.A.D. Mother         stents     Hypertension Mother      Cancer - colorectal Mother      Cancer Mother         skin cancer     Colon Cancer Mother      Hyperlipidemia Mother      Cancer Father         bladder, metastatic to bone, passed 2002     Skin Cancer Father      Other Cancer Father         Bladder     Prostate Cancer Paternal Grandfather      Prostate Cancer Maternal Grandfather      Hypertension Brother      Diabetes No family hx of      Cerebrovascular Disease No family hx of      Breast Cancer No family hx of          Current Outpatient Medications   Medication Sig Dispense Refill     hydrochlorothiazide (HYDRODIURIL) 50 MG tablet TAKE ONE TABLET BY MOUTH EVERY DAY 30 tablet 0     simvastatin (ZOCOR) 40 MG tablet TAKE ONE TABLET BY MOUTH AT BEDTIME 30 tablet 0     tamsulosin (FLOMAX) 0.4 MG capsule TAKE ONE CAPSULE BY MOUTH EVERY DAY 90 capsule 3     Allergies   Allergen Reactions     Penicillins Unknown     Unsure of exact reaction, had it as an infant       Reviewed and updated as needed this visit by clinical staff   Tobacco  Allergies  Meds              Reviewed and updated as needed this visit by Provider                     Review of Systems   Constitutional: Negative for chills and fever.   HENT: Negative for congestion, ear pain, hearing loss and sore throat.    Eyes: Negative for pain and visual disturbance.   Respiratory: Negative for cough and shortness of breath.    Cardiovascular: Negative for chest pain, palpitations and peripheral edema.   Gastrointestinal: Negative for abdominal pain, constipation, diarrhea, heartburn, hematochezia and nausea.   Genitourinary: Negative for dysuria, frequency, genital sores, hematuria, impotence, penile discharge and urgency.   Musculoskeletal: Negative for  "arthralgias, joint swelling and myalgias.   Skin: Negative for rash.   Neurological: Negative for dizziness, weakness, headaches and paresthesias.   Psychiatric/Behavioral: Negative for mood changes. The patient is not nervous/anxious.      OBJECTIVE:   /83 (BP Location: Left arm, Patient Position: Chair, Cuff Size: Adult Regular)   Pulse 51   Temp 98.1  F (36.7  C) (Temporal)   Ht 1.733 m (5' 8.23\")   Wt 79.8 kg (175 lb 14.4 oz)   SpO2 97%   BMI 26.57 kg/m      Physical Exam  GENERAL: healthy, alert and no distress  EYES: Eyes grossly normal to inspection, PERRL and conjunctivae and sclerae normal  HENT: ear canals and TM's normal, nose and mouth without ulcers or lesions  NECK: no adenopathy, no asymmetry, masses, or scars and thyroid normal to palpation  RESP: lungs clear to auscultation - no rales, rhonchi or wheezes  CV: regular rate and rhythm, normal S1 S2, no S3 or S4, no murmur, click or rub, no peripheral edema and peripheral pulses strong  ABDOMEN: soft, nontender, no hepatosplenomegaly, no masses and bowel sounds normal   (male): pt declines  RECTAL (male): pt declines  MS: no gross musculoskeletal defects noted, no edema  NEURO: Normal strength and tone, mentation intact and speech normal  PSYCH: mentation appears normal, affect normal/bright  LYMPH: no cervical adenopathy    Diagnostic Test Results:  Labs reviewed in Epic  No results found for any visits on 01/16/23.    ASSESSMENT/PLAN:       ICD-10-CM    1. Routine general medical examination at a health care facility  Z00.00 PRIMARY CARE FOLLOW-UP SCHEDULING     REVIEW OF HEALTH MAINTENANCE PROTOCOL ORDERS      2. Hyperlipidemia LDL goal <130  E78.5 Lipid panel reflex to direct LDL Fasting     Comprehensive metabolic panel     simvastatin (ZOCOR) 40 MG tablet     Lipid panel reflex to direct LDL Fasting     Comprehensive metabolic panel      3. History of kidney stones  Z87.442 Comprehensive metabolic panel     hydrochlorothiazide " (HYDRODIURIL) 50 MG tablet     Comprehensive metabolic panel      4. Hypercalciuria  R82.994 hydrochlorothiazide (HYDRODIURIL) 50 MG tablet      5. Benign prostatic hyperplasia (BPH) with straining on urination  N40.1 tamsulosin (FLOMAX) 0.4 MG capsule    R39.16       6. Screening for prostate cancer  Z12.5 PROSTATE SPEC ANTIGEN SCREEN     PROSTATE SPEC ANTIGEN SCREEN      7. Family history of colon cancer  Z80.0         1. Routine general medical examination at a health care facility  Reviewed VS and weight/BMI with pt   Immunizations:  up to date  Counseling as below   Health screenings/maintenance per orders/comments below  When applicable based on age, personal hx, fam hx, and RF- counseled on appropriate cancer screenings.     - PRIMARY CARE FOLLOW-UP SCHEDULING  - REVIEW OF HEALTH MAINTENANCE PROTOCOL ORDERS    2. Hyperlipidemia LDL goal <130  Fasting today for labs   Exercises regularly and on heart healthy diet. Continue healthy habits  Refilled statin  - Lipid panel reflex to direct LDL Fasting; Future  - Comprehensive metabolic panel; Future  - simvastatin (ZOCOR) 40 MG tablet; Take 1 tablet (40 mg) by mouth At Bedtime  Dispense: 90 tablet; Refill: 3  - Lipid panel reflex to direct LDL Fasting  - Comprehensive metabolic panel    3. History of kidney stones  4. Hypercalciuria  Has not had a kidney stone in > 5 yr since being on hydrochlorothiazide   Update labs   Refilled   - Comprehensive metabolic panel; Future  - hydrochlorothiazide (HYDRODIURIL) 50 MG tablet; Take 1 tablet (50 mg) by mouth daily  Dispense: 90 tablet; Refill: 3  - Comprehensive metabolic panel    5. Benign prostatic hyperplasia (BPH) with straining on urination  Doing well with tamsulosin current dose. Refilling   - tamsulosin (FLOMAX) 0.4 MG capsule; Take 1 capsule (0.4 mg) by mouth daily  Dispense: 90 capsule; Refill: 3    6. Screening for prostate cancer  Agreeable to screening PSA blood test   - PROSTATE SPEC ANTIGEN SCREEN;  Future  - PROSTATE SPEC ANTIGEN SCREEN    7. Family history of colon cancer  He had colonoscopy in 2021 at [facility]. ROX today for records. Every 5 yr with fam hx of colon cancer in mother.       Patient has been advised of split billing requirements and indicates understanding: Yes      COUNSELING:   Reviewed preventive health counseling, as reflected in patient instructions       Regular exercise       Healthy diet/nutrition       Vision screening       Colorectal cancer screening       Prostate cancer screening        He reports that he has never smoked. He has never used smokeless tobacco.            Debbie Whalen PA-C  [facility]

## 2023-02-06 ENCOUNTER — OFFICE VISIT (OUTPATIENT)
Dept: DERMATOLOGY | Facility: CLINIC | Age: 63
End: 2023-02-06
Payer: COMMERCIAL

## 2023-02-06 DIAGNOSIS — L57.8 ACTINIC SKIN DAMAGE: ICD-10-CM

## 2023-02-06 DIAGNOSIS — L81.4 SOLAR LENTIGO: ICD-10-CM

## 2023-02-06 DIAGNOSIS — D18.01 CHERRY ANGIOMA: ICD-10-CM

## 2023-02-06 DIAGNOSIS — D22.9 MULTIPLE BENIGN NEVI: Primary | ICD-10-CM

## 2023-02-06 DIAGNOSIS — L82.1 SEBORRHEIC KERATOSES: ICD-10-CM

## 2023-02-06 PROCEDURE — 99213 OFFICE O/P EST LOW 20 MIN: CPT | Performed by: DERMATOLOGY

## 2023-02-06 ASSESSMENT — PAIN SCALES - GENERAL: PAINLEVEL: NO PAIN (0)

## 2023-02-06 NOTE — LETTER
2/6/2023         RE: Serjio Shields  [address]  [place] [address]        Dear Colleague,    Thank you for referring your patient, Serjio Shields, to the [facility]. Please see a copy of my visit note below.    [facility] Note  Encounter Date: Feb 6, 2023  Office Visit     Dermatology Problem List:  Last skin check 2/6/23, recommended next in 6 months  1. Actinic keratosis, s/p cryo  - Field therapy with dovonex/efudex April 2018, re-initiated on 10/25/21  - S/p cryotherapy to lesion on scalp April 2018  2. 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 Nummular Eczema - occasional use of OTC hydrocortisone,      Social history: . Wears sun protection, especially when flying. Has a puppy.  Family history: Skin cancer in father, uncertain type.  ____________________________________________     Assessment & Plan:    # History of diffuse actinic damage. Recently s/p Efudex/calcipotriene treatment with excellent response.   - Hold off additional treatment for now  - Recommended Vaseline and sunscreen use to area.   - Follow up in 6 months for FBSE and recheck    # Benign lesions: Multiple benign nevi, solar lentigos, seborrheic keratoses, cherry angiomas. Explained to patient benign nature of lesion. No treatment is necessary at this time unless the lesion changes or becomes symptomatic.   - ABCDEs of melanoma were discussed and self skin checks were advised.  - Sun precaution was advised including the use of sun screens of SPF 30 or higher, sun protective clothing, and avoidance of tanning beds.    Procedures Performed:   None.    Follow-up: 6 month(s) in-person for a skin check, or earlier for new or changing lesions    Staff and Scribe:     Scribe Disclosure:   González MCNULTY, am serving as a scribe to document services personally performed by this physician, Dr. Faisal Nicolas, based on data collection and the  provider's statements to me.     Provider Disclosure:   The documentation recorded by the scribe accurately reflects the services I personally performed and the decisions made by me.    Faisal Nicolas MD    Department of Dermatology  [facility]: Phone: [phone], Fax:[phone]  [facility]: Phone: [phone] Fax: [phone]  ____________________________________________    CC: Skin Check (FBSC: no area of concern. )    HPI:  Mr. Serjio Shields is a(n) 62 year old male who presents today as a return patient for a skin check.    Last seen 10/25/21 for a skin check. At that time, 5 AKs were treated with cryotherapy.    Today, he has no areas of concern.     The patient otherwise denies any new or concerning lesions. No bleeding, painful, pruritic, or changing lesions. Health otherwise stable. No other skin concerns. They do use sunscreen and protective clothing when outdoors for sun protection.       Labs Reviewed:  N/A    Physical Exam:  Vitals: There were no vitals taken for this visit.  SKIN: Waist-up skin, which includes the head/face, neck, both arms, chest, back, abdomen, digits and/or nails was examined.  - There are dome shaped bright red papules on the trunk and extremities.   - Multiple regular brown pigmented macules and papules are identified on the trunk and extremities.   - Scattered brown macules on sun exposed areas.  - There are waxy stuck on tan to brown papules on the trunk and extremities.     - Pink scaly thin plaques on the bilateral nose and medial cheeks.  - No other lesions of concern on areas examined.     Medications:  Current Outpatient Medications   Medication     hydrochlorothiazide (HYDRODIURIL) 50 MG tablet     simvastatin (ZOCOR) 40 MG tablet     tamsulosin (FLOMAX) 0.4 MG capsule     No current facility-administered medications for this visit.       Past Medical History:   Patient Active Problem List   Diagnosis     Kidney stone     Colon polyp     Family history of colon cancer     Family history of prostate cancer     Advanced directives, counseling/discussion     Hyperlipidemia LDL goal <130     History of pulmonary embolism     Seborrheic keratosis     Freckled skin     Elevated blood pressure reading     Posterior vitreous detachment of left eye     History of radial keratotomy     Past Medical History:   Diagnosis Date     Family history of colon cancer      Family history of colon cancer      Pulmonary emboli (H)      Pure hypercholesterolemia      Renal disease     kidney stones            Again, thank you for allowing me to participate in the care of your patient.        Sincerely,        Faisal Nicolas MD

## 2023-02-06 NOTE — PATIENT INSTRUCTIONS
Patient Education     Checking for Skin Cancer  You can find cancer early by checking your skin each month. There are 3 kinds of skin cancer. They are melanoma, basal cell carcinoma, and squamous cell carcinoma. Doing monthly skin checks is the best way to find new marks or skin changes. Follow the instructions below for checking your skin.   The ABCDEs of checking moles for melanoma   Check your moles or growths for signs of melanoma using ABCDE:   Asymmetry: the sides of the mole or growth don t match  Border: the edges are ragged, notched, or blurred  Color: the color within the mole or growth varies  Diameter: the mole or growth is larger than 6 mm (size of a pencil eraser)  Evolving: the size, shape, or color of the mole or growth is changing (evolving is not shown in the images below)    Checking for other types of skin cancer  Basal cell carcinoma or squamous cell carcinoma have symptoms such as:     A spot or mole that looks different from all other marks on your skin  Changes in how an area feels, such as itching, tenderness, or pain  Changes in the skin's surface, such as oozing, bleeding, or scaliness  A sore that does not heal  New swelling or redness beyond the border of a mole    Who s at risk?  Anyone can get skin cancer. But you are at greater risk if you have:   Fair skin, light-colored hair, or light-colored eyes  Many moles or abnormal moles on your skin  A history of sunburns from sunlight or tanning beds  A family history of skin cancer  A history of exposure to radiation or chemicals  A weakened immune system  If you have had skin cancer in the past, you are at risk for recurring skin cancer.   How to check your skin  Do your monthly skin checkups in front of a full-length mirror. Check all parts of your body, including your:   Head (ears, face, neck, and scalp)  Torso (front, back, and sides)  Arms (tops, undersides, upper, and lower armpits)  Hands (palms, backs, and fingers, including under  the nails)  Buttocks and genitals  Legs (front, back, and sides)  Feet (tops, soles, toes, including under the nails, and between toes)  If you have a lot of moles, take digital photos of them each month. Make sure to take photos both up close and from a distance. These can help you see if any moles change over time.   Most skin changes are not cancer. But if you see any changes in your skin, call your doctor right away. Only he or she can diagnose a problem. If you have skin cancer, seeing your doctor can be the first step toward getting the treatment that could save your life.   [employer] last reviewed this educational content on 4/1/2019 2000-2020 [employer]. [address] [place] [address], [place] [address]. All rights reserved. This information is not intended as a substitute for professional medical care. Always follow your healthcare professional's instructions.       When should I call my doctor?  If you are worsening or not improving, please, contact us or seek urgent care as noted below.     Who should I call with questions (adults)?  [facility] (adult and pediatric): [phone]  [facility] (adult): [phone]  For urgent needs outside of business hours call the [facility] at [phone] and ask for the dermatology resident on call to be paged  If this is a medical emergency and you are unable to reach an ER, Call [phone]    Who should I call with questions (pediatric)?  [facility]- Pediatric Dermatology  Dr. Alison Case, Dr. Mariella Stacy, Dr. Abi Cassidy, JANICE Salinas, Dr. Britta Lepe, Dr. Usha Ceballos & Dr. Jabari Moon  Non-urgent nurse triage line; [phone]- Shira and Nena CHRISTIANSON Care Coordinators   Sheree (/Complex ) [phone]    If you need a prescription refill, please contact your pharmacy. Refills are approved or denied by our Physicians  during normal business hours, Monday through Fridays  Per office policy, refills will not be granted if you have not been seen within the past year (or sooner depending on your child's condition)    Scheduling Information:  Pediatric Appointment Scheduling and Call Center [phone]  Radiology Scheduling- [phone]  Sedation Unit Scheduling- [phone]  [facility] Scheduling- General [phone]; Pediatric Dermatology [phone]  Main  Services: [phone]  Swiss: [phone]  Turkmen: [phone]  Hmong/Rand[place]/Marquis: [phone]  Preadmission Nursing Department Fax Number: [phone] (Fax all pre-operative paperwork to this number)    For urgent matters arising during evenings, weekends, or holidays that cannot wait for normal business hours please call [phone] and ask for the dermatology resident on call to be paged.

## 2023-02-06 NOTE — PROGRESS NOTES
[facility] Dermatology Note  Encounter Date: Feb 6, 2023  Office Visit     Dermatology Problem List:  Last skin check 2/6/23, recommended next in 6 months  1. Actinic keratosis, s/p cryo  - Field therapy with dovonex/efudex April 2018, re-initiated on 10/25/21  - S/p cryotherapy to lesion on scalp April 2018  2. 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 Nummular Eczema - occasional use of OTC hydrocortisone,      Social history: . Wears sun protection, especially when flying. Has a puppy.  Family history: Skin cancer in father, uncertain type.  ____________________________________________     Assessment & Plan:    # History of diffuse actinic damage. Recently s/p Efudex/calcipotriene treatment with excellent response.   - Hold off additional treatment for now  - Recommended Vaseline and sunscreen use to area.   - Follow up in 6 months for FBSE and recheck    # Benign lesions: Multiple benign nevi, solar lentigos, seborrheic keratoses, cherry angiomas. Explained to patient benign nature of lesion. No treatment is necessary at this time unless the lesion changes or becomes symptomatic.   - ABCDEs of melanoma were discussed and self skin checks were advised.  - Sun precaution was advised including the use of sun screens of SPF 30 or higher, sun protective clothing, and avoidance of tanning beds.    Procedures Performed:   None.    Follow-up: 6 month(s) in-person for a skin check, or earlier for new or changing lesions    Staff and Scribe:     Scribe Disclosure:   I, González Knight, am serving as a scribe to document services personally performed by this physician, Dr. Faisal Nicolas, based on data collection and the provider's statements to me.     Provider Disclosure:   The documentation recorded by the scribe accurately reflects the services I personally performed and the decisions made by me.    Faisal Nicolas MD    Department of  Dermatology  [facility]: Phone: [phone], Fax:[phone]  [facility]: Phone: [phone] Fax: [phone]  ____________________________________________    CC: Skin Check (FBSC: no area of concern. )    HPI:  Mr. Serjio Shields is a(n) 62 year old male who presents today as a return patient for a skin check.    Last seen 10/25/21 for a skin check. At that time, 5 AKs were treated with cryotherapy.    Today, he has no areas of concern.     The patient otherwise denies any new or concerning lesions. No bleeding, painful, pruritic, or changing lesions. Health otherwise stable. No other skin concerns. They do use sunscreen and protective clothing when outdoors for sun protection.       Labs Reviewed:  N/A    Physical Exam:  Vitals: There were no vitals taken for this visit.  SKIN: Waist-up skin, which includes the head/face, neck, both arms, chest, back, abdomen, digits and/or nails was examined.  - There are dome shaped bright red papules on the trunk and extremities.   - Multiple regular brown pigmented macules and papules are identified on the trunk and extremities.   - Scattered brown macules on sun exposed areas.  - There are waxy stuck on tan to brown papules on the trunk and extremities.     - Pink scaly thin plaques on the bilateral nose and medial cheeks.  - No other lesions of concern on areas examined.     Medications:  Current Outpatient Medications   Medication     hydrochlorothiazide (HYDRODIURIL) 50 MG tablet     simvastatin (ZOCOR) 40 MG tablet     tamsulosin (FLOMAX) 0.4 MG capsule     No current facility-administered medications for this visit.      Past Medical History:   Patient Active Problem List   Diagnosis     Kidney stone     Colon polyp     Family history of colon cancer     Family history of prostate cancer     Advanced directives, counseling/discussion     Hyperlipidemia LDL  goal <130     History of pulmonary embolism     Seborrheic keratosis     Freckled skin     Elevated blood pressure reading     Posterior vitreous detachment of left eye     History of radial keratotomy     Past Medical History:   Diagnosis Date     Family history of colon cancer      Family history of colon cancer      Pulmonary emboli (H)      Pure hypercholesterolemia      Renal disease     kidney stones

## 2023-02-06 NOTE — NURSING NOTE
Serjio Shields's chief complaint for this visit includes:  Chief Complaint   Patient presents with     Skin Check     FBSC: no area of concern.      PCP: Debbie Whalen    Referring Provider:  No referring provider defined for this encounter.    There were no vitals taken for this visit.  No Pain (0)        Allergies   Allergen Reactions     Penicillins Unknown     Unsure of exact reaction, had it as an infant         Do you need any medication refills at today's visit?  No.       Gail Robbins LPN

## 2023-02-10 ENCOUNTER — DOCUMENTATION ONLY (OUTPATIENT)
Dept: OTHER | Facility: CLINIC | Age: 63
End: 2023-02-10
Payer: COMMERCIAL

## 2023-06-05 ENCOUNTER — TELEPHONE (OUTPATIENT)
Dept: ORTHOPEDICS | Facility: CLINIC | Age: 63
End: 2023-06-05
Payer: COMMERCIAL

## 2023-06-05 NOTE — TELEPHONE ENCOUNTER
[facility] Call Center    Phone Message    May a detailed message be left on voicemail: yes     Reason for Call: Other: Pt returning call to Dr. Armstrong's care team. -      Action Taken: Message routed to:  Other: Dr. Jabari Armstrong     Travel Screening: Not Applicable

## 2023-06-05 NOTE — TELEPHONE ENCOUNTER
DIAGNOSIS: How to get range of motion back in my shoulders   APPOINTMENT DATE: 06/15/2023   NOTES STATUS DETAILS   OFFICE NOTE from referring provider N/A    OFFICE NOTE from other specialist Received 10/24/2016 [facility]   10/17/2016 [facility]   DISCHARGE SUMMARY from hospital N/A    DISCHARGE REPORT from the ER N/A    OPERATIVE REPORT N/A    EMG report N/A    MEDICATION LIST N/A    MRI In process 10/18/2016 [facility] shoulder   DEXA (osteoporosis/bone health) N/A    CT SCAN N/A    XRAYS (IMAGES & REPORTS) N/A        Images receivedSona Last on 6/8/2023 at 3:49 PM

## 2023-06-15 ENCOUNTER — PRE VISIT (OUTPATIENT)
Dept: ORTHOPEDICS | Facility: CLINIC | Age: 63
End: 2023-06-15
Payer: COMMERCIAL

## 2023-06-15 ENCOUNTER — ANCILLARY PROCEDURE (OUTPATIENT)
Dept: GENERAL RADIOLOGY | Facility: CLINIC | Age: 63
End: 2023-06-15
Attending: PREVENTIVE MEDICINE
Payer: COMMERCIAL

## 2023-06-15 ENCOUNTER — OFFICE VISIT (OUTPATIENT)
Dept: ORTHOPEDICS | Facility: CLINIC | Age: 63
End: 2023-06-15
Payer: COMMERCIAL

## 2023-06-15 DIAGNOSIS — M50.30 DDD (DEGENERATIVE DISC DISEASE), CERVICAL: ICD-10-CM

## 2023-06-15 DIAGNOSIS — M25.512 CHRONIC PAIN OF BOTH SHOULDERS: ICD-10-CM

## 2023-06-15 DIAGNOSIS — G89.29 CHRONIC PAIN OF BOTH SHOULDERS: ICD-10-CM

## 2023-06-15 DIAGNOSIS — M25.511 BILATERAL SHOULDER PAIN: ICD-10-CM

## 2023-06-15 DIAGNOSIS — M54.2 CERVICAL PAIN: Primary | ICD-10-CM

## 2023-06-15 DIAGNOSIS — M25.512 BILATERAL SHOULDER PAIN: ICD-10-CM

## 2023-06-15 DIAGNOSIS — M12.811 ROTATOR CUFF ARTHROPATHY OF BOTH SHOULDERS: ICD-10-CM

## 2023-06-15 DIAGNOSIS — M12.812 ROTATOR CUFF ARTHROPATHY OF BOTH SHOULDERS: ICD-10-CM

## 2023-06-15 DIAGNOSIS — M54.2 CERVICAL PAIN: ICD-10-CM

## 2023-06-15 DIAGNOSIS — M25.511 CHRONIC PAIN OF BOTH SHOULDERS: ICD-10-CM

## 2023-06-15 PROCEDURE — 73030 X-RAY EXAM OF SHOULDER: CPT | Mod: LT | Performed by: RADIOLOGY

## 2023-06-15 PROCEDURE — 72040 X-RAY EXAM NECK SPINE 2-3 VW: CPT | Mod: GC | Performed by: RADIOLOGY

## 2023-06-15 PROCEDURE — 99204 OFFICE O/P NEW MOD 45 MIN: CPT | Performed by: PREVENTIVE MEDICINE

## 2023-06-15 RX ORDER — CELECOXIB 100 MG/1
100 CAPSULE ORAL 2 TIMES DAILY
Qty: 60 CAPSULE | Refills: 0 | Status: SHIPPED | OUTPATIENT
Start: 2023-06-15 | End: 2023-07-07

## 2023-06-15 NOTE — LETTER
6/15/2023         RE: Serjio Shields  [address]  [place] [facility] [address]        Dear Colleague,    Thank you for referring your patient, Serjio Shields, to the [facility] [place]. Please see a copy of my visit note below.    HISTORY OF PRESENT ILLNESS  Mr. Shields is a pleasant 62 year old year old male who presents to clinic today with the following:  What problem are you here for? Bilateral shoulder pain, reduced range of motion  History of cervical radiculopathy causing numbness in both hands  Has done PT in the past for his shoulders and neck which did help  No injuries  How long have you had this problem? 5 years     Have you had any recent imaging of this problem? Xrays/MRI/CT scans? Yes     Have you had treatments for this problem in the past?  -Medications? no  -Physical therapy? Yes, was helpful   -Injections? unsure  -Surgery? no    How severe is this problem today? 0-10 scale? 5    How severe has this problem been at WORST in the past? 0-10 scale? 10    What do you think caused this problem? Has had shoulder problems for a long time, thinks he has torn rotator cuffs potentially     Does this problem or its symptoms cause difficulty for you falling asleep or staying asleep? Occasionally     Anything else you want us to know about this problem? no          MEDICAL HISTORY  Patient Active Problem List   Diagnosis     Kidney stone     Colon polyp     Family history of colon cancer     Family history of prostate cancer     Advanced directives, counseling/discussion     Hyperlipidemia LDL goal <130     History of pulmonary embolism     Seborrheic keratosis     Freckled skin     Elevated blood pressure reading     Posterior vitreous detachment of left eye     History of radial keratotomy       Current Outpatient Medications   Medication Sig Dispense Refill     hydrochlorothiazide (HYDRODIURIL) 50 MG tablet Take 1 tablet (50 mg) by mouth daily 90 tablet 3     simvastatin  (ZOCOR) 40 MG tablet Take 1 tablet (40 mg) by mouth At Bedtime 90 tablet 3     tamsulosin (FLOMAX) 0.4 MG capsule Take 1 capsule (0.4 mg) by mouth daily 90 capsule 3       Allergies   Allergen Reactions     Penicillins Unknown     Unsure of exact reaction, had it as an infant       Family History   Problem Relation Age of Onset     C.A.D. Mother         stents     Hypertension Mother      Cancer - colorectal Mother      Cancer Mother         skin cancer     Colon Cancer Mother      Hyperlipidemia Mother      Cancer Father         bladder, metastatic to bone, passed 2002     Skin Cancer Father      Other Cancer Father         Bladder     Prostate Cancer Maternal Grandfather      Prostate Cancer Paternal Grandfather      Diabetes No family hx of      Cerebrovascular Disease No family hx of      Breast Cancer No family hx of      Social History     Socioeconomic History     Marital status: Single   Tobacco Use     Smoking status: Never     Smokeless tobacco: Never   Vaping Use     Vaping status: Never Used     Passive vaping exposure: Yes   Substance and Sexual Activity     Alcohol use: Yes     Comment: rare use     Drug use: Never     Sexual activity: Not Currently     Partners: Female     Birth control/protection: Male Surgical   Other Topics Concern     Parent/sibling w/ CABG, MI or angioplasty before 65F 55M? No       Additional medical/Social/Surgical histories reviewed in [facility] and updated as appropriate.     REVIEW OF SYSTEMS (6/15/2023)  10 point ROS of systems including Constitutional, Eyes, Respiratory, Cardiovascular, Gastroenterology, Genitourinary, Integumentary, Musculoskeletal, Psychiatric, Allergic/Immunologic were all negative except for pertinent positives noted in my HPI.     PHYSICAL EXAM  VSS  Vital Signs: There were no vitals taken for this visit. Patient declined being weighed. There is no height or weight on file to calculate BMI.    General  - normal appearance, in no obvious distress  HEENT  -  conjunctivae not injected, moist mucous membranes, normocephalic/atraumatic head, ears normal appearance, no lesions, mouth normal appearance, no scars, normal dentition and teeth present  CV  - normal radial pulse  Pulm  - normal respiratory pattern, non-labored  Musculoskeletal -bilateral shoulder  - inspection: normal bone and joint alignment, no obvious deformity, no scapular winging, no AC step-off  - palpation: mildly tender RC insertion, normal clavicle, non-tender AC  - ROM:  painful and limited flexion and ER at end range, limited IR  - strength: 5/5  strength, 5/5 in all shoulder planes  - special tests:  (-) Speed's  (+) Neer  (+) Hawkin's  (+) Raz's  (-) [place]'s  (-) apprehension  (-) subscap lift-off  Neuro  - no sensory or motor deficit, grossly normal coordination, normal muscle tone  Skin  - no ecchymosis, erythema, warmth, or induration, no obvious rash  Psych  - interactive, appropriate, normal mood and affect  Neck: has pain with flexion/extension and positive spurlings    ASSESSMENT & PLAN  63 yo male with cervical ddd, radicular pain, and bilateral chronic shoulder pain due to rotator cuff arthropathy    I independently reviewed the following imaging studies:  Cervical xrays: shows ddd  Bilateral shoulder xrays shows some arthritis GH joints  Discussed considering injections for shoulders and/or cervical MRI for further evaluation  Start PT,, ordered  RX given for celebrex  F/u 3 weeks  Patient has been doing home exercise physical therapy program for this problem      Appropriate PPE was utilized for prevention of spread of Covid-19.  Jabari Armstrong MD, [facility]        Again, thank you for allowing me to participate in the care of your patient.        Sincerely,        Jabari Armstrong MD

## 2023-06-15 NOTE — PROGRESS NOTES
HISTORY OF PRESENT ILLNESS  Mr. Shields is a pleasant 62 year old year old male who presents to clinic today with the following:  What problem are you here for? Bilateral shoulder pain, reduced range of motion  History of cervical radiculopathy causing numbness in both hands  Has done PT in the past for his shoulders and neck which did help  No injuries  How long have you had this problem? 5 years     Have you had any recent imaging of this problem? Xrays/MRI/CT scans? Yes     Have you had treatments for this problem in the past?  -Medications? no  -Physical therapy? Yes, was helpful   -Injections? unsure  -Surgery? no    How severe is this problem today? 0-10 scale? 5    How severe has this problem been at WORST in the past? 0-10 scale? 10    What do you think caused this problem? Has had shoulder problems for a long time, thinks he has torn rotator cuffs potentially     Does this problem or its symptoms cause difficulty for you falling asleep or staying asleep? Occasionally     Anything else you want us to know about this problem? no          MEDICAL HISTORY  Patient Active Problem List   Diagnosis     Kidney stone     Colon polyp     Family history of colon cancer     Family history of prostate cancer     Advanced directives, counseling/discussion     Hyperlipidemia LDL goal <130     History of pulmonary embolism     Seborrheic keratosis     Freckled skin     Elevated blood pressure reading     Posterior vitreous detachment of left eye     History of radial keratotomy       Current Outpatient Medications   Medication Sig Dispense Refill     hydrochlorothiazide (HYDRODIURIL) 50 MG tablet Take 1 tablet (50 mg) by mouth daily 90 tablet 3     simvastatin (ZOCOR) 40 MG tablet Take 1 tablet (40 mg) by mouth At Bedtime 90 tablet 3     tamsulosin (FLOMAX) 0.4 MG capsule Take 1 capsule (0.4 mg) by mouth daily 90 capsule 3       Allergies   Allergen Reactions     Penicillins Unknown     Unsure of exact reaction, had it as  an infant       Family History   Problem Relation Age of Onset     C.A.D. Mother         stents     Hypertension Mother      Cancer - colorectal Mother      Cancer Mother         skin cancer     Colon Cancer Mother      Hyperlipidemia Mother      Cancer Father         bladder, metastatic to bone, passed 2002     Skin Cancer Father      Other Cancer Father         Bladder     Prostate Cancer Maternal Grandfather      Prostate Cancer Paternal Grandfather      Diabetes No family hx of      Cerebrovascular Disease No family hx of      Breast Cancer No family hx of      Social History     Socioeconomic History     Marital status: Single   Tobacco Use     Smoking status: Never     Smokeless tobacco: Never   Vaping Use     Vaping status: Never Used     Passive vaping exposure: Yes   Substance and Sexual Activity     Alcohol use: Yes     Comment: rare use     Drug use: Never     Sexual activity: Not Currently     Partners: Female     Birth control/protection: Male Surgical   Other Topics Concern     Parent/sibling w/ CABG, MI or angioplasty before 65F 55M? No       Additional medical/Social/Surgical histories reviewed in [facility] and updated as appropriate.     REVIEW OF SYSTEMS (6/15/2023)  10 point ROS of systems including Constitutional, Eyes, Respiratory, Cardiovascular, Gastroenterology, Genitourinary, Integumentary, Musculoskeletal, Psychiatric, Allergic/Immunologic were all negative except for pertinent positives noted in my HPI.     PHYSICAL EXAM  VSS  Vital Signs: There were no vitals taken for this visit. Patient declined being weighed. There is no height or weight on file to calculate BMI.    General  - normal appearance, in no obvious distress  HEENT  - conjunctivae not injected, moist mucous membranes, normocephalic/atraumatic head, ears normal appearance, no lesions, mouth normal appearance, no scars, normal dentition and teeth present  CV  - normal radial pulse  Pulm  - normal respiratory pattern,  non-labored  Musculoskeletal -bilateral shoulder  - inspection: normal bone and joint alignment, no obvious deformity, no scapular winging, no AC step-off  - palpation: mildly tender RC insertion, normal clavicle, non-tender AC  - ROM:  painful and limited flexion and ER at end range, limited IR  - strength: 5/5  strength, 5/5 in all shoulder planes  - special tests:  (-) Speed's  (+) Neer  (+) Hawkin's  (+) Raz's  (-) [place]'s  (-) apprehension  (-) subscap lift-off  Neuro  - no sensory or motor deficit, grossly normal coordination, normal muscle tone  Skin  - no ecchymosis, erythema, warmth, or induration, no obvious rash  Psych  - interactive, appropriate, normal mood and affect  Neck: has pain with flexion/extension and positive spurlings    ASSESSMENT & PLAN  63 yo male with cervical ddd, radicular pain, and bilateral chronic shoulder pain due to rotator cuff arthropathy    I independently reviewed the following imaging studies:  Cervical xrays: shows ddd  Bilateral shoulder xrays shows some arthritis GH joints  Discussed considering injections for shoulders and/or cervical MRI for further evaluation  Start PT,, ordered  RX given for celebrex  F/u 3 weeks  Patient has been doing home exercise physical therapy program for this problem      Appropriate PPE was utilized for prevention of spread of Covid-19.  Jabari Armstrong MD, CAQSM

## 2023-07-06 DIAGNOSIS — M54.2 CERVICAL PAIN: ICD-10-CM

## 2023-07-06 DIAGNOSIS — M25.511 CHRONIC PAIN OF BOTH SHOULDERS: ICD-10-CM

## 2023-07-06 DIAGNOSIS — G89.29 CHRONIC PAIN OF BOTH SHOULDERS: ICD-10-CM

## 2023-07-06 DIAGNOSIS — M25.512 CHRONIC PAIN OF BOTH SHOULDERS: ICD-10-CM

## 2023-07-06 NOTE — TELEPHONE ENCOUNTER
Prescription refill requested for:   celecoxib (CELEBREX) 100 MG capsule      Last Written Prescription Date:  6/15/23  Last Fill Quantity: 60,   # refills: 0  Last Office Visit: 6/15/23  Future Office visit:           Edwardo Belle ATC

## 2023-07-07 RX ORDER — CELECOXIB 100 MG/1
CAPSULE ORAL
Qty: 60 CAPSULE | Refills: 0 | Status: SHIPPED | OUTPATIENT
Start: 2023-07-07 | End: 2024-02-07

## 2023-11-06 ENCOUNTER — OFFICE VISIT (OUTPATIENT)
Dept: FAMILY MEDICINE | Facility: CLINIC | Age: 63
End: 2023-11-06
Payer: COMMERCIAL

## 2023-11-06 VITALS
BODY MASS INDEX: 26.34 KG/M2 | OXYGEN SATURATION: 96 % | HEART RATE: 65 BPM | TEMPERATURE: 98.3 F | HEIGHT: 68 IN | DIASTOLIC BLOOD PRESSURE: 74 MMHG | WEIGHT: 173.8 LBS | SYSTOLIC BLOOD PRESSURE: 120 MMHG

## 2023-11-06 DIAGNOSIS — H60.391 INFECTIVE OTITIS EXTERNA, RIGHT: Primary | ICD-10-CM

## 2023-11-06 PROCEDURE — 99214 OFFICE O/P EST MOD 30 MIN: CPT | Performed by: PHYSICIAN ASSISTANT

## 2023-11-06 RX ORDER — CIPROFLOXACIN AND DEXAMETHASONE 3; 1 MG/ML; MG/ML
4 SUSPENSION/ DROPS AURICULAR (OTIC) 2 TIMES DAILY
Qty: 7.5 ML | Refills: 0 | Status: SHIPPED | OUTPATIENT
Start: 2023-11-06 | End: 2024-02-07

## 2023-11-06 RX ORDER — CEFUROXIME AXETIL 500 MG/1
500 TABLET ORAL 2 TIMES DAILY
Qty: 14 TABLET | Refills: 0 | Status: SHIPPED | OUTPATIENT
Start: 2023-11-06 | End: 2024-02-07

## 2023-11-06 ASSESSMENT — PAIN SCALES - GENERAL: PAINLEVEL: NO PAIN (0)

## 2023-11-06 NOTE — PROGRESS NOTES
"  Assessment & Plan     Infective otitis externa, right  Appearance of otitis externa- canal narrowed and tender- unable to tell if more cerumen (some removed in Urgent care) and unable to see TM.   Treat w/cirpodex and oral ceftin.   If not starting to improve in next 36-48 hr, recheck.   - ciprofloxacin-dexAMETHasone (CIPRODEX) 0.3-0.1 % otic suspension; Place 4 drops into the right ear 2 times daily  - cefuroxime (CEFTIN) 500 MG tablet; Take 1 tablet (500 mg) by mouth 2 times daily       BMI:   Estimated body mass index is 26.28 kg/m  as calculated from the following:    Height as of this encounter: 1.732 m (5' 8.19\").    Weight as of this encounter: 78.8 kg (173 lb 12.8 oz).   Weight management plan: Discussed healthy diet and exercise guidelines    Follow Up: see above. Additionally patient was instructed to contact clinic for worsening symptoms, non-improvement in time frame discussed, and for questions regarding treatment plan.   For virtual visits, the patient was advised to be seen for in person evaluation if symptoms or condition are worsening or non-improvement as expected.       Debbie Whalen PA-C  [facility] BERENICE Bear is a 63 year old, presenting for the following health issues:  Otalgia        11/6/2023     2:42 PM   Additional Questions   Roomed by Katharine POTTER   Accompanied by None         11/6/2023     2:42 PM   Patient Reported Additional Medications   Patient reports taking the following new medications NA     Denies cold/flu symptoms. Denies sore throat ands congestion.     History of Present Illness       Reason for visit:  Ear pain  Symptom onset:  1-3 days ago  Symptoms include:  Ear pain in right ear and muffled sound in that ear.  Symptom intensity:  Mild  Symptom progression:  Worsening  Had these symptoms before:  No  What makes it worse:  Sticking my finger in my ear.  What makes it better:  Not sticking my finger in my ear.    He eats 2-3 servings of fruits " "and vegetables daily.He consumes 1 sweetened beverage(s) daily.He exercises with enough effort to increase his heart rate 10 to 19 minutes per day.  He exercises with enough effort to increase his heart rate 4 days per week.   He is taking medications regularly.     Right ear pain-   Woke up with pain in the right ear over the weekend (3 days ago)   Was seen in urgent care. Told he had wax and that was the issue. Removed wax with lavage but no change in his sx.   Pressure feeling  Crackling like water in the ear.   +Hearing change- muffled  No drainage from it.   No instrumentation of ear  No URI sx  No ear plug   No swimming or hot tub use.   No fevers.        Review of Systems   Constitutional, HEENT, cardiovascular, pulmonary, gi and gu systems are negative, except as otherwise noted.      Objective    /74 (BP Location: Left arm, Patient Position: Chair, Cuff Size: Adult Regular)   Pulse 65   Temp 98.3  F (36.8  C) (Temporal)   Ht 1.732 m (5' 8.19\")   Wt 78.8 kg (173 lb 12.8 oz)   SpO2 96%   BMI 26.28 kg/m    Body mass index is 26.28 kg/m .  Physical Exam   GENERAL: healthy, alert and no distress  EYES: Eyes grossly normal to inspection, PERRL and conjunctivae and sclerae normal  HENT: Normal cephalic/atraumatic. Right ear: no pain with percussion of mastoid,  + tender with auricle manipulation, + tender with movement of tragus. Right canal is edematous and tender with exam, unable to see TM.  Left ear: canal clear and TM's normal, no effusion. Nose mucosa: normal. Lips normal, no lesions. Buccal muscosa moist. Soft palate no lesions or ulcers. Tonsils normal, no erythema, no exudates. Uvula midline. Posterior oropharynx no erythema.   NECK: no adenopathy, no asymmetry, masses, or scars and thyroid normal to palpation  RESP: lungs clear to auscultation - no rales, rhonchi or wheezes  CV: regular rate and rhythm, normal S1 S2, no S3 or S4, no murmur, click or rub, no peripheral edema and " peripheral pulses strong

## 2024-01-19 DIAGNOSIS — N40.1 BENIGN PROSTATIC HYPERPLASIA (BPH) WITH STRAINING ON URINATION: ICD-10-CM

## 2024-01-19 DIAGNOSIS — R39.16 BENIGN PROSTATIC HYPERPLASIA (BPH) WITH STRAINING ON URINATION: ICD-10-CM

## 2024-01-19 RX ORDER — TAMSULOSIN HYDROCHLORIDE 0.4 MG/1
0.4 CAPSULE ORAL DAILY
Qty: 30 CAPSULE | Refills: 0 | Status: SHIPPED | OUTPATIENT
Start: 2024-01-19 | End: 2024-02-07

## 2024-01-27 DIAGNOSIS — R82.994 HYPERCALCIURIA: ICD-10-CM

## 2024-01-27 DIAGNOSIS — Z87.442 HISTORY OF KIDNEY STONES: ICD-10-CM

## 2024-01-30 RX ORDER — HYDROCHLOROTHIAZIDE 50 MG/1
50 TABLET ORAL DAILY
Qty: 90 TABLET | Refills: 0 | Status: SHIPPED | OUTPATIENT
Start: 2024-01-30 | End: 2024-02-07

## 2024-01-31 SDOH — HEALTH STABILITY: PHYSICAL HEALTH: ON AVERAGE, HOW MANY MINUTES DO YOU ENGAGE IN EXERCISE AT THIS LEVEL?: 20 MIN

## 2024-01-31 SDOH — HEALTH STABILITY: PHYSICAL HEALTH: ON AVERAGE, HOW MANY DAYS PER WEEK DO YOU ENGAGE IN MODERATE TO STRENUOUS EXERCISE (LIKE A BRISK WALK)?: 3 DAYS

## 2024-01-31 ASSESSMENT — SOCIAL DETERMINANTS OF HEALTH (SDOH): HOW OFTEN DO YOU GET TOGETHER WITH FRIENDS OR RELATIVES?: ONCE A WEEK

## 2024-02-07 ENCOUNTER — OFFICE VISIT (OUTPATIENT)
Dept: FAMILY MEDICINE | Facility: CLINIC | Age: 64
End: 2024-02-07
Payer: COMMERCIAL

## 2024-02-07 ENCOUNTER — MYC MEDICAL ADVICE (OUTPATIENT)
Dept: FAMILY MEDICINE | Facility: CLINIC | Age: 64
End: 2024-02-07

## 2024-02-07 VITALS
RESPIRATION RATE: 18 BRPM | HEART RATE: 52 BPM | HEIGHT: 69 IN | DIASTOLIC BLOOD PRESSURE: 76 MMHG | OXYGEN SATURATION: 99 % | TEMPERATURE: 97.8 F | WEIGHT: 176.44 LBS | BODY MASS INDEX: 26.13 KG/M2 | SYSTOLIC BLOOD PRESSURE: 112 MMHG

## 2024-02-07 DIAGNOSIS — E78.5 HYPERLIPIDEMIA LDL GOAL <130: ICD-10-CM

## 2024-02-07 DIAGNOSIS — Z00.00 MEDICARE ANNUAL WELLNESS VISIT, INITIAL: Primary | ICD-10-CM

## 2024-02-07 DIAGNOSIS — Z87.442 HISTORY OF KIDNEY STONES: ICD-10-CM

## 2024-02-07 DIAGNOSIS — Z12.5 SCREENING FOR PROSTATE CANCER: ICD-10-CM

## 2024-02-07 DIAGNOSIS — N40.1 BENIGN PROSTATIC HYPERPLASIA (BPH) WITH STRAINING ON URINATION: ICD-10-CM

## 2024-02-07 DIAGNOSIS — R82.994 HYPERCALCIURIA: ICD-10-CM

## 2024-02-07 DIAGNOSIS — R39.16 BENIGN PROSTATIC HYPERPLASIA (BPH) WITH STRAINING ON URINATION: ICD-10-CM

## 2024-02-07 DIAGNOSIS — Z80.0 FAMILY HISTORY OF COLON CANCER: ICD-10-CM

## 2024-02-07 LAB
ALBUMIN SERPL BCG-MCNC: 4.2 G/DL (ref 3.5–5.2)
ALP SERPL-CCNC: 70 U/L (ref 40–150)
ALT SERPL W P-5'-P-CCNC: 22 U/L (ref 0–70)
ANION GAP SERPL CALCULATED.3IONS-SCNC: 10 MMOL/L (ref 7–15)
AST SERPL W P-5'-P-CCNC: 26 U/L (ref 0–45)
BILIRUB SERPL-MCNC: 0.5 MG/DL
BUN SERPL-MCNC: 10.1 MG/DL (ref 8–23)
CALCIUM SERPL-MCNC: 9 MG/DL (ref 8.8–10.2)
CHLORIDE SERPL-SCNC: 103 MMOL/L (ref 98–107)
CHOLEST SERPL-MCNC: 187 MG/DL
CREAT SERPL-MCNC: 0.82 MG/DL (ref 0.67–1.17)
DEPRECATED HCO3 PLAS-SCNC: 27 MMOL/L (ref 22–29)
EGFRCR SERPLBLD CKD-EPI 2021: >90 ML/MIN/1.73M2
ERYTHROCYTE [DISTWIDTH] IN BLOOD BY AUTOMATED COUNT: 12.3 % (ref 10–15)
FASTING STATUS PATIENT QL REPORTED: YES
GLUCOSE SERPL-MCNC: 93 MG/DL (ref 70–99)
HCT VFR BLD AUTO: 45.8 % (ref 40–53)
HDLC SERPL-MCNC: 50 MG/DL
HGB BLD-MCNC: 15.9 G/DL (ref 13.3–17.7)
LDLC SERPL CALC-MCNC: 106 MG/DL
MCH RBC QN AUTO: 32.5 PG (ref 26.5–33)
MCHC RBC AUTO-ENTMCNC: 34.7 G/DL (ref 31.5–36.5)
MCV RBC AUTO: 94 FL (ref 78–100)
NONHDLC SERPL-MCNC: 137 MG/DL
PLATELET # BLD AUTO: 172 10E3/UL (ref 150–450)
POTASSIUM SERPL-SCNC: 3.9 MMOL/L (ref 3.4–5.3)
PROT SERPL-MCNC: 6.9 G/DL (ref 6.4–8.3)
PSA SERPL DL<=0.01 NG/ML-MCNC: 1.46 NG/ML (ref 0–4.5)
RBC # BLD AUTO: 4.89 10E6/UL (ref 4.4–5.9)
SODIUM SERPL-SCNC: 140 MMOL/L (ref 135–145)
TRIGL SERPL-MCNC: 155 MG/DL
WBC # BLD AUTO: 5.3 10E3/UL (ref 4–11)

## 2024-02-07 PROCEDURE — G0402 INITIAL PREVENTIVE EXAM: HCPCS | Performed by: PHYSICIAN ASSISTANT

## 2024-02-07 PROCEDURE — G0103 PSA SCREENING: HCPCS | Performed by: PHYSICIAN ASSISTANT

## 2024-02-07 PROCEDURE — 99214 OFFICE O/P EST MOD 30 MIN: CPT | Mod: 25 | Performed by: PHYSICIAN ASSISTANT

## 2024-02-07 PROCEDURE — 80061 LIPID PANEL: CPT | Performed by: PHYSICIAN ASSISTANT

## 2024-02-07 PROCEDURE — 80053 COMPREHEN METABOLIC PANEL: CPT | Performed by: PHYSICIAN ASSISTANT

## 2024-02-07 PROCEDURE — 85027 COMPLETE CBC AUTOMATED: CPT | Performed by: PHYSICIAN ASSISTANT

## 2024-02-07 PROCEDURE — 36415 COLL VENOUS BLD VENIPUNCTURE: CPT | Performed by: PHYSICIAN ASSISTANT

## 2024-02-07 RX ORDER — RESPIRATORY SYNCYTIAL VIRUS VACCINE 120MCG/0.5
0.5 KIT INTRAMUSCULAR ONCE
Qty: 1 EACH | Refills: 0 | Status: CANCELLED | OUTPATIENT
Start: 2024-02-07 | End: 2024-02-07

## 2024-02-07 RX ORDER — SIMVASTATIN 40 MG
40 TABLET ORAL AT BEDTIME
Qty: 90 TABLET | Refills: 3 | Status: SHIPPED | OUTPATIENT
Start: 2024-02-07

## 2024-02-07 RX ORDER — TAMSULOSIN HYDROCHLORIDE 0.4 MG/1
0.4 CAPSULE ORAL DAILY
Qty: 90 CAPSULE | Refills: 3 | Status: SHIPPED | OUTPATIENT
Start: 2024-02-07

## 2024-02-07 RX ORDER — HYDROCHLOROTHIAZIDE 50 MG/1
50 TABLET ORAL DAILY
Qty: 90 TABLET | Refills: 3 | Status: SHIPPED | OUTPATIENT
Start: 2024-02-07

## 2024-02-07 ASSESSMENT — PAIN SCALES - GENERAL: PAINLEVEL: NO PAIN (0)

## 2024-02-07 NOTE — PROGRESS NOTES
Preventive Care Visit  [facility] BERENICE Whalen PA-C, Family Medicine  Feb 7, 2024    Assessment & Plan     Medicare annual wellness visit, initial  Reviewed VS, weight/BMI   Routine screenings see orders  Immunizations: see orders and documentation in HPI. Discussed vaccines coverage as pharmacy benefit.  Health and cancer screening recommendations delivered according to the USPSTF and other appropriate society guidelines  Nutrition and exercise counseling performed.  - Lipid panel reflex to direct LDL Non-fasting; Future  - REVIEW OF HEALTH MAINTENANCE PROTOCOL ORDERS  - PROSTATE SPEC ANTIGEN SCREEN; Future  - Comprehensive metabolic panel; Future  - CBC with platelets; Future  - PRIMARY CARE FOLLOW-UP SCHEDULING; Future    Hyperlipidemia LDL goal <130  Well controlled on statin. Tolerating statin well. No side effects. Update labs , refilled x 1 yr.   Counseled on heart healthy eating and regular exercise  - Lipid panel reflex to direct LDL Fasting; Future  - simvastatin (ZOCOR) 40 MG tablet; Take 1 tablet (40 mg) by mouth at bedtime  Dispense: 90 tablet; Refill: 3    History of kidney stones  Hypercalciuria  Has been on hydrochlorothiazide for prevention for years. Tolerates well. No kidney stone sx.  Labs today   - hydrochlorothiazide (HYDRODIURIL) 50 MG tablet; Take 1 tablet (50 mg) by mouth daily  Dispense: 90 tablet; Refill: 3    Benign prostatic hyperplasia (BPH) with straining on urination  Effective, tolerates well. Emptying well, no nocturia. Refilled   - tamsulosin (FLOMAX) 0.4 MG capsule; Take 1 capsule (0.4 mg) by mouth daily  Dispense: 90 capsule; Refill: 3    Screening for prostate cancer  Discussed PSA screening guidelines. Pt agreeable to screen.   - PROSTATE SPEC ANTIGEN SCREEN; Future    Family history of colon cancer  ROX for [facility] records. Had scope in 2021 we don't have a record for. He is scheduled May 2024 for another scope.      Counseling  Appropriate preventive  services were discussed with this patient, including applicable screening as appropriate for fall prevention, nutrition, physical activity, Tobacco-use cessation, weight loss and cognition.  Checklist reviewing preventive services available has been given to the patient.  Reviewed patient's diet, addressing concerns and/or questions.   He is at risk for lack of exercise and has been provided with information to increase physical activity for the benefit of his well-being.     Patient has been advised of split billing requirements and indicates understanding: Yes    Follow Up: see above. Additionally patient was instructed to contact clinic for worsening symptoms, non-improvement in time frame discussed, and for questions regarding treatment plan.   For virtual visits, the patient was advised to be seen for in person evaluation if symptoms or condition are worsening or non-improvement as expected.   SENG Horn   Chema is a 63 year old, presenting for the following:  Physical        2/7/2024     6:58 AM   Additional Questions   Roomed by VE        Health Care Directive  Patient has a Health Care Directive on file  Advance care planning document is on file and is current.    HPI  Routine Health Maintenance:  Immunizations: prevnar declines.   Colonoscopy: 02/2016. He did a scope in 2021. He was told a 3 yr follow up. Has scheduled May 2024. Colon cancer in mother.   PSA screening: normal screen prior.   Fasting: yes  Diabetes screening: normal prior     Hyperlipidemia- on simvastatin.   Vegetarian diet. Exercise- lifting weights 3 d per week.   Working parttime at [employer] on feet    Hydrochlorothiazide for kidney stone prevention. Not checking home BP.     BPH- tamsulosin - emptying well. No nocturia.             1/31/2024   General Health   How would you rate your overall physical health? Excellent   Feel stress (tense, anxious, or unable to sleep) Not at all         1/31/2024    Nutrition   Diet: Low salt    Vegetarian/vegan         1/31/2024   Exercise   Days per week of moderate/strenous exercise 3 days   Average minutes spent exercising at this level 20 min         1/31/2024   Social Factors   Frequency of gathering with friends or relatives Once a week   Worry food won't last until get money to buy more No   Food not last or not have enough money for food? No   Do you have housing?  Yes   Are you worried about losing your housing? No   Lack of transportation? No   Unable to get utilities (heat,electricity)? No         1/31/2024   Fall Risk   Fallen 2 or more times in the past year? No   Trouble with walking or balance? No          1/31/2024   Activities of Daily Living- Home Safety   Needs help with the following daily activites None of the above   Safety concerns in the home None of the above         1/31/2024   Dental   Dentist two times every year? Yes         1/31/2024   Hearing Screening   Hearing concerns? None of the above         1/31/2024   Driving Risk Screening   Patient/family members have concerns about driving No         1/31/2024   General Alertness/Fatigue Screening   Have you been more tired than usual lately? No         1/31/2024   Urinary Incontinence Screening   Bothered by leaking urine in past 6 months No         1/31/2024   TB Screening   Were you born outside of [place]?  No         Today's PHQ-2 Score:       2/7/2024     6:34 AM   PHQ-2 ( 1999 [employer])   Q1: Little interest or pleasure in doing things 0   Q2: Feeling down, depressed or hopeless 0   PHQ-2 Score 0   Q1: Little interest or pleasure in doing things Not at all   Q2: Feeling down, depressed or hopeless Not at all   PHQ-2 Score 0           1/31/2024   Substance Use   Alcohol more than 3/day or more than 7/wk No   Do you have a current opioid prescription? No   How severe/bad is pain from 1 to 10? 0/10 (No Pain)   Do you use any other substances recreationally? No     Social History     Tobacco Use     Smoking status: Never     Passive exposure: Never    Smokeless tobacco: Never   Vaping Use    Vaping Use: Never used   Substance Use Topics    Alcohol use: Yes     Comment: rare use    Drug use: Never           1/31/2024   AAA Screening   Family history of Abdominal Aortic Aneurysm (AAA)? No   Last PSA:   PSA   Date Value Ref Range Status   11/23/2020 1.96 0 - 4 ug/L Final     Comment:     Assay Method:  Chemiluminescence using Siemens Vista analyzer     Prostate Specific Antigen Screen   Date Value Ref Range Status   01/16/2023 1.79 0.00 - 4.00 ug/L Final     The 10-year ASCVD risk score (Vincent BORGES, et al., 2019) is: 9.3%    Values used to calculate the score:      Age: 63 years      Sex: Male      Is Non- : No      Diabetic: No      Tobacco smoker: No      Systolic Blood Pressure: 112 mmHg      Is BP treated: No      HDL Cholesterol: 51 mg/dL      Total Cholesterol: 179 mg/dL            Reviewed and updated as needed this visit by Provider                    Past Medical History:   Diagnosis Date    Family history of colon cancer     Family history of colon cancer     Pulmonary emboli (H)     Pure hypercholesterolemia     Renal disease     kidney stones     Past Surgical History:   Procedure Laterality Date    COLONOSCOPY  1/16    CYSTOSCOPY, URETEROSCOPY, COMBINED Left 6/24/2015    Procedure: COMBINED CYSTOSCOPY, URETEROSCOPY;  Surgeon: Larry Elias MD;  Location: [facility] OR    EYE SURGERY  6/90    RK on both eyes    SURGICAL HISTORY OF -       meatus dilation in childhood     Lab work is in process  Labs reviewed in EPIC  BP Readings from Last 3 Encounters:   02/07/24 112/76   11/06/23 120/74   01/16/23 127/83    Wt Readings from Last 3 Encounters:   02/07/24 80 kg (176 lb 7 oz)   11/06/23 78.8 kg (173 lb 12.8 oz)   01/16/23 79.8 kg (175 lb 14.4 oz)                  Patient Active Problem List   Diagnosis    Kidney stone    Colon polyp    Family history of colon cancer    Family  history of prostate cancer    Advanced directives, counseling/discussion    Hyperlipidemia LDL goal <130    History of pulmonary embolism    Seborrheic keratosis    Freckled skin    Elevated blood pressure reading    Posterior vitreous detachment of left eye    History of radial keratotomy     Past Surgical History:   Procedure Laterality Date    COLONOSCOPY  1/16    CYSTOSCOPY, URETEROSCOPY, COMBINED Left 6/24/2015    Procedure: COMBINED CYSTOSCOPY, URETEROSCOPY;  Surgeon: Larry Elias MD;  Location: [facility] OR    EYE SURGERY  6/90    RK on both eyes    SURGICAL HISTORY OF -       meatus dilation in childhood       Social History     Tobacco Use    Smoking status: Never     Passive exposure: Never    Smokeless tobacco: Never   Substance Use Topics    Alcohol use: Yes     Comment: rare use     Family History   Problem Relation Age of Onset    C.A.D. Mother         stents    Hypertension Mother     Cancer - colorectal Mother     Cancer Mother         skin cancer    Colon Cancer Mother     Hyperlipidemia Mother     Cancer Father         bladder, metastatic to bone, passed 2002    Skin Cancer Father     Other Cancer Father         Bladder    Prostate Cancer Maternal Grandfather     Prostate Cancer Paternal Grandfather     Diabetes No family hx of     Cerebrovascular Disease No family hx of     Breast Cancer No family hx of          Current Outpatient Medications   Medication Sig Dispense Refill    hydrochlorothiazide (HYDRODIURIL) 50 MG tablet TAKE ONE TABLET BY MOUTH EVERY DAY 90 tablet 0    simvastatin (ZOCOR) 40 MG tablet Take 1 tablet (40 mg) by mouth At Bedtime 90 tablet 3    tamsulosin (FLOMAX) 0.4 MG capsule TAKE ONE CAPSULE BY MOUTH EVERY DAY 30 capsule 0     Allergies   Allergen Reactions    Penicillins Unknown     Unsure of exact reaction, had it as an infant     Current providers sharing in care for this patient include:  Patient Care Team:  Debbie Whalen PA-C as PCP - Anita Soto  "MD Pau as MD (Nephrology)  Sony Kidd MD as Referring Physician (Family Practice)  Johnathon Griffin MD as MD (Family Medicine - Sports Medicine)  Kailey Martinez MD as MD (Dermatology)  Faisal Nicolas MD as MD (Dermatology)  Debbie Whalen PA-C as Assigned PCP  Faisal Nicolas MD as Assigned Surgical Provider  Jabari Armstrong MD as Assigned Musculoskeletal Provider    The following health maintenance items are reviewed in Epic and correct as of today:  Health Maintenance   Topic Date Due    RSV VACCINE (Pregnancy & 60+) (1 - 1-dose 60+ series) Never done    BMP  01/16/2024    LIPID  01/16/2024    ANNUAL REVIEW OF HM ORDERS  01/16/2024    Pneumococcal Vaccine: 65+ Years (1 of 1 - PCV) 10/15/2023    MEDICARE ANNUAL WELLNESS VISIT  01/16/2024    PSA  01/16/2024    FALL RISK ASSESSMENT  02/07/2025    GLUCOSE  01/16/2026    COLORECTAL CANCER SCREENING  02/28/2026    ADVANCE CARE PLANNING  02/10/2028    DTAP/TDAP/TD IMMUNIZATION (3 - Td or Tdap) 11/23/2030    HEPATITIS C SCREENING  Completed    HIV SCREENING  Completed    PHQ-2 (once per calendar year)  Completed    INFLUENZA VACCINE  Completed    ZOSTER IMMUNIZATION  Completed    COVID-19 Vaccine  Completed    IPV IMMUNIZATION  Aged Out    HPV IMMUNIZATION  Aged Out    MENINGITIS IMMUNIZATION  Aged Out    RSV MONOCLONAL ANTIBODY  Aged Out     Review of Systems    Review of Systems  Constitutional, HEENT, cardiovascular, pulmonary, gi and gu systems are negative, except as otherwise noted.     Objective    Exam  /76 (BP Location: Left arm, Patient Position: Chair, Cuff Size: Adult Regular)   Pulse 52   Temp 97.8  F (36.6  C) (Temporal)   Resp 18   Ht 1.74 m (5' 8.5\")   Wt 80 kg (176 lb 7 oz)   SpO2 99%   BMI 26.44 kg/m     Estimated body mass index is 26.44 kg/m  as calculated from the following:    Height as of this encounter: 1.74 m (5' 8.5\").    Weight as of this encounter: 80 kg (176 lb 7 " oz).    Physical Exam  GENERAL: alert and no distress  EYES: Eyes grossly normal to inspection, PERRL and conjunctivae and sclerae normal  HENT: ear canals and TM's normal, nose and mouth without ulcers or lesions  NECK: no adenopathy, no asymmetry, masses, or scars  RESP: lungs clear to auscultation - no rales, rhonchi or wheezes  CV: regular rate and rhythm, normal S1 S2, no S3 or S4, no murmur, click or rub, no peripheral edema  ABDOMEN: soft, nontender, no hepatosplenomegaly, no masses and bowel sounds normal  MS: no gross musculoskeletal defects noted, no edema  SKIN: no suspicious lesions or rashes  NEURO: Normal strength and tone, mentation intact and speech normal  PSYCH: mentation appears normal, affect normal/bright        2/7/2024   Mini Cog   Clock Draw Score 2 Normal   3 Item Recall 2 objects recalled   Mini Cog Total Score 4         Vision Screen  Patient wears corrective lenses (select all that apply): Worn during vision screen  Vision Screen Results: Pass    Signed Electronically by: Debbie Whalen PA-C

## 2024-02-07 NOTE — TELEPHONE ENCOUNTER
Sent mychart that lipids are still in process but were ordered.    Jennie Miller CMA ([facility])

## 2024-02-07 NOTE — PATIENT INSTRUCTIONS
"Learning About Being Physically Active  What is physical activity?     Being physically active means doing any kind of activity that gets your body moving.  The types of physical activity that can help you get fit and stay healthy include:  Aerobic or \"cardio\" activities. These make your heart beat faster and make you breathe harder, such as brisk walking, riding a bike, or running. They strengthen your heart and lungs and build up your endurance.  Strength training activities. These make your muscles work against, or \"resist,\" something. Examples include lifting weights or doing push-ups. These activities help tone and strengthen your muscles and bones.  Stretches. These let you move your joints and muscles through their full range of motion. Stretching helps you be more flexible.  Reaching a balance between these three types of physical activity is important because each one contributes to your overall fitness.  What are the benefits of being active?  Being active is one of the best things you can do for your health. It helps you to:  Feel stronger and have more energy to do all the things you like to do.  Focus better at school or work.  Feel, think, and sleep better.  Reach and stay at a healthy weight.  Lose fat and build lean muscle.  Lower your risk for serious health problems, including diabetes, heart attack, high blood pressure, and some cancers.  Keep your heart, lungs, bones, muscles, and joints strong and healthy.  How can you make being active part of your life?  Start slowly. Make it your long-term goal to get at least 30 minutes of exercise on most days of the week. Walking is a good choice. You also may want to do other activities, such as running, swimming, cycling, or playing tennis or team sports.  Pick activities that you like--ones that make your heart beat faster, your muscles stronger, and your muscles and joints more flexible. If you find more than one thing you like doing, do them all. You " "don't have to do the same thing every day.  Get your heart pumping every day. Any activity that makes your heart beat faster and keeps it at that rate for a while counts.  Here are some great ways to get your heart beating faster:  Go for a brisk walk, run, or hike.  Go for a swim or bike ride.  Take an online exercise class or dance.  Play a game of touch football, basketball, or soccer.  Play tennis, pickleball, or racquetball.  Climb stairs.  Even some household chores can be aerobic. Just do them at a faster pace. Raking or mowing the lawn, sweeping the garage, and vacuuming and cleaning your home all can help get your heart rate up.  Strengthen your muscles during the week. You don't have to lift heavy weights or grow big, bulky muscles to get stronger. Doing a few simple activities that make your muscles work against, or \"resist,\" something can help you get stronger. Aim for at least twice a week.  For example, you can:  Do push-ups or sit-ups, which use your own body weight as resistance.  Lift weights or dumbbells or use stretch bands at home or in a gym or community center.  Stretch your muscles often. Stretching will help you as you become more active. It can help you stay flexible and loosen tight muscles. It can also help improve your balance and posture and can be a great way to relax.  Be sure to stretch the muscles you'll be using when you work out. It's best to warm your muscles slightly before you stretch them. Walk or do some other light aerobic activity for a few minutes. Then start stretching.  When you stretch your muscles:  Do it slowly. Stretching is not about going fast or making sudden movements.  Don't push or bounce during a stretch.  Hold each stretch for at least 15 to 30 seconds, if you can. You should feel a stretch in the muscle, but not pain.  Breathe out as you do the stretch. Then breathe in as you hold the stretch. Don't hold your breath.  If you're worried about how more activity " "might affect your health, have a checkup before you start. Follow any special advice your doctor gives you for getting a smart start.  Where can you learn more?  Go to https://www.[employer].net/patiented  Enter W332 in the search box to learn more about \"Learning About Being Physically Active.\"  Current as of: June 6, 2023               Content Version: 13.8    6927-3725 [employer].   Care instructions adapted under license by your healthcare professional. If you have questions about a medical condition or this instruction, always ask your healthcare professional. [employer] disclaims any warranty or liability for your use of this information.      Eating Healthy Foods: Care Instructions  With every meal, you can make healthy food choices. Try to eat a variety of fruits, vegetables, whole grains, lean proteins, and low-fat dairy products. This can help you get the right balance of nutrients, including vitamins and minerals. Small changes add up over time. You can start by adding one healthy food to your meals each day.    Try to make half your plate fruits and vegetables, one-fourth whole grains, and one-fourth lean proteins. Try including dairy with your meals.   Eat more fruits and vegetables. Try to have them with most meals and snacks.   Foods for healthy eating    Fruits    These can be fresh, frozen, canned, or dried.  Try to choose whole fruit rather than fruit juice.  Eat a variety of colors.    Vegetables    These can be fresh, frozen, canned, or dried.  Beans, peas, and lentils count too.    Whole grains    Choose whole-grain breads, cereals, and noodles.  Try brown rice.    Lean proteins    These can include lean meat, poultry, fish, and eggs.  You can also have tofu, beans, peas, lentils, nuts, and seeds.    Dairy    Try milk, yogurt, and cheese.  Choose low-fat or fat-free when you can.  If you need to, use lactose-free milk or fortified plant-based milk products, such as " "soy milk.    Water    Drink water when you're thirsty.  Limit sugar-sweetened drinks, including soda, fruit drinks, and sports drinks.  Where can you learn more?  Go to https://www.[employer].net/patiented  Enter T756 in the search box to learn more about \"Eating Healthy Foods: Care Instructions.\"  Current as of: February 28, 2023               Content Version: 13.8    3572-4830 [employer].   Care instructions adapted under license by your healthcare professional. If you have questions about a medical condition or this instruction, always ask your healthcare professional. [employer] disclaims any warranty or liability for your use of this information.      Nutrition for Older Adults: Care Instructions  Overview     Good nutrition is important at any age. But it is especially important for older adults. Eating a healthy diet helps keep your body strong. And it can help lower your risk for disease.  As you get older, your body needs more of certain nutrients. These include vitamin B12, calcium, and vitamin D. But it may be harder for you to get these and other important nutrients. This could be for many reasons. You may not feel as hungry as you used to. Or you could have problems with your teeth or mouth that make it hard to chew. Or you may not enjoy planning and preparing meals, especially if you live alone.  Talk with your doctor if you want help getting the most nutrition from what you eat. The doctor may have you work with a dietitian to help you plan meals.  Follow-up care is a key part of your treatment and safety. Be sure to make and go to all appointments, and call your doctor if you are having problems. It's also a good idea to know your test results and keep a list of the medicines you take.  How can you care for yourself at home?  To stay healthy  Eat a variety of foods. The more you vary the foods you eat, the more vitamins, minerals, and other nutrients you get.  Take a " multivitamin every day. Choose one with about 100% of the daily value (DV) for vitamins and minerals. Do not take more than 100% of the daily value for any vitamin or mineral unless your doctor tells you to. Talk with your doctor if you are not sure which multivitamin is right for you.  Eat lots of fruits and vegetables. Fresh, frozen, or no-salt canned vegetables and fruits in their own juice or light syrup are good choices.  Include foods that are high in vitamin B12 in your diet. Good choices are fortified breakfast cereal, nonfat or low-fat milk and other dairy products, meat, poultry, fish, and eggs.  Get enough calcium and vitamin D. Good choices include nonfat or low-fat milk, cheese, and yogurt. Other good options are tofu, orange juice with added calcium, and some leafy green vegetables, such as steve greens and kale. If you don't use milk products, talk to your doctor about calcium and vitamin D supplements.  Eat protein foods every day. Good choices include lean meat, fish, poultry, eggs, and cheese. Other good options are cooked beans, peanut butter, and nuts and seeds.  Choose whole grains for half of the grains you eat. Look for 100% whole wheat bread, whole-grain cereals, brown rice, and other whole grains.  If you have constipation  Eat high-fiber foods every day. These include fruits, vegetables, cooked dried beans, and whole grains.  Drink plenty of fluids. If you have kidney, heart, or liver disease and have to limit fluids, talk with your doctor before you increase the amount of fluids you drink.  Ask your doctor if stool softeners may help keep your bowels regular.  If you have mouth problems that make chewing hard  Pick canned or cooked fruits and vegetables. These are often softer.  Chop or shred meat, poultry, and fish. Add sauce or gravy to the meat to help keep it moist.  Pick other protein foods that are soft. These include cheese, peanut butter, cooked beans, cottage cheese, and  "eggs.  If you have trouble shopping for yourself  Ask a local food store to deliver groceries to your home.  Contact a volunteer center and ask for help.  Ask a family member or neighbor to help you.  If you have trouble preparing meals  If you are able, take a cooking class.  Use a microwave oven to cook TV dinners and other frozen or prepared foods.  Take part in group meal programs. You can find these through senior citizen programs.  Have meals brought to your home. Your community may offer programs that deliver meals, such as Meals on Wheels.  If your appetite is poor  Try eating smaller amounts of food more often. For example, eat 4 or 5 small meals a day instead of 1 or 2 large meals.  Eat with family and friends. Or take part in group meal programs offered through volunteer programs. Eating with others may help your appetite. And it helps you be more social.  Ask your doctor if your medicines could cause appetite or taste problems. If so, ask about changing medicines.  Add spices and herbs to increase the flavor of food.  If you think you are depressed, ask your doctor for help. Depression can affect your appetite. And it can make it hard to do everyday activities like grocery shopping and making meals. Treatment can help.  When should you call for help?  Watch closely for changes in your health, and be sure to contact your doctor if you have any problems.  Where can you learn more?  Go to https://www.[employer].net/patiented  Enter L643 in the search box to learn more about \"Nutrition for Older Adults: Care Instructions.\"  Current as of: February 26, 2023               Content Version: 13.8    7027-3656 [employer].   Care instructions adapted under license by your healthcare professional. If you have questions about a medical condition or this instruction, always ask your healthcare professional. [employer] disclaims any warranty or liability for your use of this " information.      Preventive Care Advice   This is general advice given by our system to help you stay healthy. However, your care team may have specific advice just for you. Please talk to your care team about your preventive care needs.  Nutrition  Eat 5 or more servings of fruits and vegetables each day.  Try wheat bread, brown rice and whole grain pasta (instead of white bread, rice, and pasta).  Get enough calcium and vitamin D. Check the label on foods and aim for 100% of the RDA (recommended daily allowance).  Lifestyle  Exercise at least 150 minutes each week  (30 minutes a day, 5 days a week).  Do muscle strengthening activities 2 days a week. These help control your weight and prevent disease.  No smoking.  Wear sunscreen to prevent skin cancer.  Have a dental exam and cleaning every 6 months.  Yearly exams  See your health care team every year to talk about:  Any changes in your health.  Any medicines your care team has prescribed.  Preventive care, family planning, and ways to prevent chronic diseases.  Shots (vaccines)   HPV shots (up to age 26), if you've never had them before.  Hepatitis B shots (up to age 59), if you've never had them before.  COVID-19 shot: Get this shot when it's due.  Flu shot: Get a flu shot every year.  Tetanus shot: Get a tetanus shot every 10 years.  Pneumococcal, hepatitis A, and RSV shots: Ask your care team if you need these based on your risk.  Shingles shot (for age 50 and up)  General health tests  Diabetes screening:  Starting at age 35, Get screened for diabetes at least every 3 years.  If you are younger than age 35, ask your care team if you should be screened for diabetes.  Cholesterol test: At age 39, start having a cholesterol test every 5 years, or more often if advised.  Bone density scan (DEXA): At age 50, ask your care team if you should have this scan for osteoporosis (brittle bones).  Hepatitis C: Get tested at least once in your life.  STIs (sexually  transmitted infections)  Before age 24: Ask your care team if you should be screened for STIs.  After age 24: Get screened for STIs if you're at risk. You are at risk for STIs (including HIV) if:  You are sexually active with more than one person.  You don't use condoms every time.  You or a partner was diagnosed with a sexually transmitted infection.  If you are at risk for HIV, ask about PrEP medicine to prevent HIV.  Get tested for HIV at least once in your life, whether you are at risk for HIV or not.  Cancer screening tests  Cervical cancer screening: If you have a cervix, begin getting regular cervical cancer screening tests starting at age 21.  Breast cancer scan (mammogram): If you've ever had breasts, begin having regular mammograms starting at age 40. This is a scan to check for breast cancer.  Colon cancer screening: It is important to start screening for colon cancer at age 45.  Have a colonoscopy test every 10 years (or more often if you're at risk) Or, ask your provider about stool tests like a FIT test every year or Cologuard test every 3 years.  To learn more about your testing options, visit:   https://www.[employer]/387596.pdf.  For help making a decision, visit:   https://bit.ly/mn43646.  Prostate cancer screening test: If you have a prostate, ask your care team if a prostate cancer screening test (PSA) at age 55 is right for you.  Lung cancer screening: If you are a current or former smoker ages 50 to 80, ask your care team if ongoing lung cancer screenings are right for you.  For informational purposes only. Not to replace the advice of your health care provider. Copyright   2023 [employer]. All rights reserved. Clinically reviewed by the [facility] Transitions Program. [employer] [id number] - REV 01/24.

## 2024-09-04 ENCOUNTER — TRANSFERRED RECORDS (OUTPATIENT)
Dept: HEALTH INFORMATION MANAGEMENT | Facility: CLINIC | Age: 64
End: 2024-09-04
Payer: COMMERCIAL

## 2024-09-16 ENCOUNTER — OFFICE VISIT (OUTPATIENT)
Dept: DERMATOLOGY | Facility: CLINIC | Age: 64
End: 2024-09-16
Payer: COMMERCIAL

## 2024-09-16 DIAGNOSIS — L81.4 SOLAR LENTIGO: ICD-10-CM

## 2024-09-16 DIAGNOSIS — D18.01 CHERRY ANGIOMA: ICD-10-CM

## 2024-09-16 DIAGNOSIS — L57.0 ACTINIC KERATOSIS: ICD-10-CM

## 2024-09-16 DIAGNOSIS — D22.9 MULTIPLE MELANOCYTIC NEVI: ICD-10-CM

## 2024-09-16 DIAGNOSIS — D48.5 NEOPLASM OF UNCERTAIN BEHAVIOR OF SKIN: Primary | ICD-10-CM

## 2024-09-16 DIAGNOSIS — L82.1 SEBORRHEIC KERATOSIS: ICD-10-CM

## 2024-09-16 PROCEDURE — 17000 DESTRUCT PREMALG LESION: CPT | Mod: XS | Performed by: DERMATOLOGY

## 2024-09-16 PROCEDURE — 11102 TANGNTL BX SKIN SINGLE LES: CPT | Performed by: DERMATOLOGY

## 2024-09-16 PROCEDURE — 88305 TISSUE EXAM BY PATHOLOGIST: CPT | Performed by: PATHOLOGY

## 2024-09-16 PROCEDURE — 17003 DESTRUCT PREMALG LES 2-14: CPT | Performed by: DERMATOLOGY

## 2024-09-16 PROCEDURE — 99213 OFFICE O/P EST LOW 20 MIN: CPT | Mod: 25 | Performed by: DERMATOLOGY

## 2024-09-16 RX ORDER — LIDOCAINE HYDROCHLORIDE AND EPINEPHRINE 10; 10 MG/ML; UG/ML
3 INJECTION, SOLUTION INFILTRATION; PERINEURAL ONCE
Status: ACTIVE | OUTPATIENT
Start: 2024-09-16

## 2024-09-16 NOTE — NURSING NOTE
Serjio Shields's goals for this visit include:   Chief Complaint   Patient presents with    Skin Check     Pt is here for a FBSC. Just wants the upper body examined. No areas of concern.        He requests these members of his care team be copied on today's visit information:     PCP: Debbie Whalen    Referring Provider:  Referred Self, MD  No address on file    There were no vitals taken for this visit.    Do you need any medication refills at today's visit?     Stacy Barry LPN on 9/16/2024 at 3:05 PM

## 2024-09-16 NOTE — LETTER
9/16/2024      Serjio Shields  [address]  [place] [address]      Dear Colleague,    Thank you for referring your patient, Serjio Shields, to the [facility]. Please see a copy of my visit note below.    [facility] Dermatology Note    Encounter Date: Sep 16, 2024    Dermatology Problem List:  Last skin check 2/6/23, recommended next in 6 months  1. Actinic keratosis, s/p cryo  - Field therapy with dovonex/efudex April 2018, re-initiated on 10/25/21  - S/p cryotherapy to lesion on scalp April 2018  2. 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 Nummular Eczema - occasional use of OTC hydrocortisone,   5. NUB, left upper chest; BCC vs IDN  - s/p shave biopsy 9/16/24     Social history: . Wears sun protection, especially when flying. Has a puppy.  Family history: Skin cancer in father, uncertain type.    ______________________________________    Impression/Plan:    1. Neoplasm of unspecified behavior of the skin (D49.2) on the left upper chest. The differential diagnosis includes BCC vs IDN.   - Shave biopsy:  After discussion of benefits and risks including but not limited to bleeding/bruising, pain/swelling, infection, scar, incomplete removal, nerve damage/numbness, recurrence, and non-diagnostic biopsy, written consent, verbal consent and photographs were obtained. Time-out was performed. The area was cleaned with isopropyl alcohol. 0.5ml of 1% lidocaine with 1:100,000 epinephrine was injected to obtain adequate anesthesia. A shave biopsy was performed. Hemostasis was achieved with aluminium chloride. Vaseline and a sterile dressing were applied. The patient tolerated the procedure and no complications were noted. The patient was provided with verbal and written post care instructions.    2. Reassurance provided for benign lesions not treated today including cherry angiomata, solar lentigines, seborrheic keratoses, and  banal-appearing melanocytic nevi.     3. Actinic keratosis x4  - Cryotherapy procedure note: After verbal consent and discussion of risks and benefits including but no limited to dyspigmentation/scar, blister, and pain, 4 was(were) treated with 1-2mm freeze border for 2 cycles with liquid nitrogen. Post cryotherapy instructions were provided.      Follow-up in 6 months.       Staff Involved:  Staff and Scribe    Scribe Disclosure:   I, Reyna Tatiana, am serving as a scribe; to document services personally performed by Jbaari Grant MD -based on data collection and the provider's statements to me.     Provider Disclosure:   The documentation recorded by the scribe accurately reflects the services I personally performed and the decisions made by me.    Jabari Grant MD   of Dermatology  Department of Dermatology  [facility]        CC:   Chief Complaint   Patient presents with     Skin Check     Pt is here for a FBSC. Just wants the upper body examined. No areas of concern.        History of Present Illness:  Mr. Serjio Shields is a 63 year old male who presents as a return patient.  Last seen in clinic on 2/6/23 by Dr. Nicolas.    Today, here for upper body exam. He has no areas of concern today.    Labs:  none    Physical exam:  Vitals: There were no vitals taken for this visit.  GEN: This is a well developed, well-nourished male in no acute distress, in a pleasant mood.    SKIN: Infante phototype I  - Waist-up skin, which includes the head/face, neck, both arms, chest, back, abdomen,both legs, digits and/or nails was examined.  - There are dome shaped bright red papules on the head/neck, trunk, extremities.   - Multiple regular brown pigmented macules and papules are identified on the head/neck, trunk, extremities.   - Scattered brown macules on sun exposed areas.  - There are waxy stuck on tan to brown papules on the head/neck, trunk, extremities.   -  There are erythematous macules with overyling adherent scale on the face, scalp and left forearm and right hand x4  - pink pearly papule  left upper chest  - No other lesions of concern on areas examined.     Past Medical History:   Past Medical History:   Diagnosis Date     Family history of colon cancer      Family history of colon cancer      Pulmonary emboli (H)      Pure hypercholesterolemia      Renal disease     kidney stones     Past Surgical History:   Procedure Laterality Date     COLONOSCOPY  1/16     CYSTOSCOPY, URETEROSCOPY, COMBINED Left 6/24/2015    Procedure: COMBINED CYSTOSCOPY, URETEROSCOPY;  Surgeon: Larry Elias MD;  Location: [facility] OR     EYE SURGERY  6/90    RK on both eyes     SURGICAL HISTORY OF -       meatus dilation in childhood       Social History:   reports that he has never smoked. He has never been exposed to tobacco smoke. He has never used smokeless tobacco. He reports current alcohol use. He reports that he does not use drugs.    Family History:  Family History   Problem Relation Age of Onset     C.A.D. Mother         stents     Hypertension Mother      Cancer - colorectal Mother      Cancer Mother         skin cancer     Colon Cancer Mother      Hyperlipidemia Mother      Cancer Father         bladder, metastatic to bone, passed 2002     Skin Cancer Father      Other Cancer Father         Bladder     Prostate Cancer Maternal Grandfather      Prostate Cancer Paternal Grandfather      Diabetes No family hx of      Cerebrovascular Disease No family hx of      Breast Cancer No family hx of        Medications:  Current Outpatient Medications   Medication Sig Dispense Refill     hydrochlorothiazide (HYDRODIURIL) 50 MG tablet Take 1 tablet (50 mg) by mouth daily 90 tablet 3     simvastatin (ZOCOR) 40 MG tablet Take 1 tablet (40 mg) by mouth at bedtime 90 tablet 3     tamsulosin (FLOMAX) 0.4 MG capsule Take 1 capsule (0.4 mg) by mouth daily 90 capsule 3     Allergies   Allergen  Reactions     Penicillins Unknown     Unsure of exact reaction, had it as an infant                    Again, thank you for allowing me to participate in the care of your patient.        Sincerely,        Jabari Grant MD

## 2024-09-16 NOTE — PROGRESS NOTES
[facility] Dermatology Note    Encounter Date: Sep 16, 2024    Dermatology Problem List:  Last skin check 2/6/23, recommended next in 6 months  1. Actinic keratosis, s/p cryo  - Field therapy with dovonex/efudex April 2018, re-initiated on 10/25/21  - S/p cryotherapy to lesion on scalp April 2018  2. 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 Nummular Eczema - occasional use of OTC hydrocortisone,   5. NUB, left upper chest; BCC vs IDN  - s/p shave biopsy 9/16/24     Social history: . Wears sun protection, especially when flying. Has a puppy.  Family history: Skin cancer in father, uncertain type.    ______________________________________    Impression/Plan:    1. Neoplasm of unspecified behavior of the skin (D49.2) on the left upper chest. The differential diagnosis includes BCC vs IDN.   - Shave biopsy:  After discussion of benefits and risks including but not limited to bleeding/bruising, pain/swelling, infection, scar, incomplete removal, nerve damage/numbness, recurrence, and non-diagnostic biopsy, written consent, verbal consent and photographs were obtained. Time-out was performed. The area was cleaned with isopropyl alcohol. 0.5ml of 1% lidocaine with 1:100,000 epinephrine was injected to obtain adequate anesthesia. A shave biopsy was performed. Hemostasis was achieved with aluminium chloride. Vaseline and a sterile dressing were applied. The patient tolerated the procedure and no complications were noted. The patient was provided with verbal and written post care instructions.    2. Reassurance provided for benign lesions not treated today including cherry angiomata, solar lentigines, seborrheic keratoses, and banal-appearing melanocytic nevi.     3. Actinic keratosis x4  - Cryotherapy procedure note: After verbal consent and discussion of risks and benefits including but no limited to dyspigmentation/scar, blister, and pain, 4 was(were) treated with 1-2mm  freeze border for 2 cycles with liquid nitrogen. Post cryotherapy instructions were provided.      Follow-up in 6 months.       Staff Involved:  Staff and Scribe    Scribe Disclosure:   I, Reyna Simpson, am serving as a scribe; to document services personally performed by Jabari Grant MD -based on data collection and the provider's statements to me.     Provider Disclosure:   The documentation recorded by the scribe accurately reflects the services I personally performed and the decisions made by me.    Jabari Grant MD   of Dermatology  Department of Dermatology  [facility]        CC:   Chief Complaint   Patient presents with    Skin Check     Pt is here for a FBSC. Just wants the upper body examined. No areas of concern.        History of Present Illness:  Mr. Serjio Shields is a 63 year old male who presents as a return patient.  Last seen in clinic on 2/6/23 by Dr. Nicolas.    Today, here for upper body exam. He has no areas of concern today.    Labs:  none    Physical exam:  Vitals: There were no vitals taken for this visit.  GEN: This is a well developed, well-nourished male in no acute distress, in a pleasant mood.    SKIN: Infante phototype I  - Waist-up skin, which includes the head/face, neck, both arms, chest, back, abdomen,both legs, digits and/or nails was examined.  - There are dome shaped bright red papules on the head/neck, trunk, extremities.   - Multiple regular brown pigmented macules and papules are identified on the head/neck, trunk, extremities.   - Scattered brown macules on sun exposed areas.  - There are waxy stuck on tan to brown papules on the head/neck, trunk, extremities.   - There are erythematous macules with overyling adherent scale on the face, scalp and left forearm and right hand x4  - pink pearly papule  left upper chest  - No other lesions of concern on areas examined.     Past Medical History:   Past Medical  History:   Diagnosis Date    Family history of colon cancer     Family history of colon cancer     Pulmonary emboli (H)     Pure hypercholesterolemia     Renal disease     kidney stones     Past Surgical History:   Procedure Laterality Date    COLONOSCOPY  1/16    CYSTOSCOPY, URETEROSCOPY, COMBINED Left 6/24/2015    Procedure: COMBINED CYSTOSCOPY, URETEROSCOPY;  Surgeon: Larry Elias MD;  Location: [facility] OR    EYE SURGERY  6/90    RK on both eyes    SURGICAL HISTORY OF -       meatus dilation in childhood       Social History:   reports that he has never smoked. He has never been exposed to tobacco smoke. He has never used smokeless tobacco. He reports current alcohol use. He reports that he does not use drugs.    Family History:  Family History   Problem Relation Age of Onset    C.A.D. Mother         stents    Hypertension Mother     Cancer - colorectal Mother     Cancer Mother         skin cancer    Colon Cancer Mother     Hyperlipidemia Mother     Cancer Father         bladder, metastatic to bone, passed 2002    Skin Cancer Father     Other Cancer Father         Bladder    Prostate Cancer Maternal Grandfather     Prostate Cancer Paternal Grandfather     Diabetes No family hx of     Cerebrovascular Disease No family hx of     Breast Cancer No family hx of        Medications:  Current Outpatient Medications   Medication Sig Dispense Refill    hydrochlorothiazide (HYDRODIURIL) 50 MG tablet Take 1 tablet (50 mg) by mouth daily 90 tablet 3    simvastatin (ZOCOR) 40 MG tablet Take 1 tablet (40 mg) by mouth at bedtime 90 tablet 3    tamsulosin (FLOMAX) 0.4 MG capsule Take 1 capsule (0.4 mg) by mouth daily 90 capsule 3     Allergies   Allergen Reactions    Penicillins Unknown     Unsure of exact reaction, had it as an infant

## 2024-09-19 LAB
PATH REPORT.COMMENTS IMP SPEC: NORMAL
PATH REPORT.FINAL DX SPEC: NORMAL
PATH REPORT.GROSS SPEC: NORMAL
PATH REPORT.MICROSCOPIC SPEC OTHER STN: NORMAL
PATH REPORT.RELEVANT HX SPEC: NORMAL

## 2024-10-02 NOTE — TELEPHONE ENCOUNTER
Action 10/02/2024   Action Taken 1) Called patient - patient has no prior treatment and no imaging       REASON FOR VISIT: Both knees    DATE OF APPT: 10/04/2024   NOTES (FOR ALL VISITS) STATUS DETAILS   OFFICE NOTE from referring provider N/A    MEDICATION LIST Internal    IMAGING  (FOR ALL VISITS)     XR N/A    MRI (HEAD, NECK, SPINE) N/A    CT (HEAD, NECK, SPINE) N/A

## 2024-10-04 ENCOUNTER — OFFICE VISIT (OUTPATIENT)
Dept: ORTHOPEDICS | Facility: CLINIC | Age: 64
End: 2024-10-04
Payer: COMMERCIAL

## 2024-10-04 ENCOUNTER — PRE VISIT (OUTPATIENT)
Dept: ORTHOPEDICS | Facility: CLINIC | Age: 64
End: 2024-10-04

## 2024-10-04 ENCOUNTER — ANCILLARY PROCEDURE (OUTPATIENT)
Dept: GENERAL RADIOLOGY | Facility: CLINIC | Age: 64
End: 2024-10-04
Attending: FAMILY MEDICINE
Payer: COMMERCIAL

## 2024-10-04 DIAGNOSIS — M25.561 BILATERAL CHRONIC KNEE PAIN: Primary | ICD-10-CM

## 2024-10-04 DIAGNOSIS — M25.561 BILATERAL KNEE PAIN: ICD-10-CM

## 2024-10-04 DIAGNOSIS — M25.562 BILATERAL KNEE PAIN: ICD-10-CM

## 2024-10-04 DIAGNOSIS — M25.561 BILATERAL KNEE PAIN: Primary | ICD-10-CM

## 2024-10-04 DIAGNOSIS — M25.562 BILATERAL KNEE PAIN: Primary | ICD-10-CM

## 2024-10-04 DIAGNOSIS — M25.562 BILATERAL CHRONIC KNEE PAIN: Primary | ICD-10-CM

## 2024-10-04 DIAGNOSIS — G89.29 BILATERAL CHRONIC KNEE PAIN: Primary | ICD-10-CM

## 2024-10-04 PROCEDURE — 99214 OFFICE O/P EST MOD 30 MIN: CPT | Performed by: FAMILY MEDICINE

## 2024-10-04 PROCEDURE — 73564 X-RAY EXAM KNEE 4 OR MORE: CPT | Mod: LT | Performed by: STUDENT IN AN ORGANIZED HEALTH CARE EDUCATION/TRAINING PROGRAM

## 2024-10-04 NOTE — PATIENT INSTRUCTIONS
-may try using knee sleeve with cut out for the kneecap as needed with exercise  -try using topical diclofenac (volataren gel) for pain  -consider icing after activity  -follow up with physical therapy  -follow up in clinic if you are not improving in 6-8 weeks      Chondromalacia / Patellofemoral Pain    CHONDROMALACIA PATELLAE:  -Pain in the front of your knee due to increased pressure from the knee cap (patella)  -There are many causes including trauma, history of dislocation or subluxation of knee cap but most often it is due to an imbalance of the thigh muscles or weak core muscles which cause irregular tracking of your kneecap on your thigh bone.  -People whose feet pronate (roll in) increase the outward pulling on the kneecap as well    SIGNS AND SYMPTOMS:  -Diffuse knee pain that worsens with stairs, squatting, prolonged sitting, jumping, and similar movements.  -Pain may be achy or sharp  -Stiffness with prolonged sitting  -Occasionally, giving way of the knee, grinding or a catching sensation    TREATMENT:  -regular exercise (biking, swimming, or elliptical are good for cardio)  -weight lifting/plyometrics are helpful but remember to keep your knees behind your toes (don't bend knee more than 90degrees)  -regular core exercises (yoga and pilates)  -arch supports may help during exercise until hips stronger to prevent ankle pronation  *over the counter semi-rigid brands include power step arch supports may be purchased at specialty shoe stores, [employer] or internet  *custom made orthotics may be ordered by your physician if needed for prolonged treatment  -Stretching and strengthening therapy  -Ice 10-15 minutes after activity. (Ice cups for massage 5-7min)  -Ice and NSAIDs help decrease inflammation. A good anti-inflammatory NSAID medication is Alleve (220mg). Take 1-2 tabs twice daily with food until pain improves or minimum of 14 days, then as needed for pain. (1-2 tabs twice daily dosing is  for patients over 12 years old. Robbin than 13 yo, check dose with doctor.)  -often component of hip weakness that leads to lower extremity (foot, ankle, shin, and knee) problems so a lot of focus will be on core strength and balance  - recommend yoga for core strengthening and stretching  -Perform exercises as instructed through handout or formal therapy if doing. Until then start with the following:  -Ankle strengthening  1) balance on one foot 1-2 min daily  2) once able to balance for 1 minute, start hip strengthening with 4 way motion with straight leg. Tie theraband around ankle and balance on other foot while doing15 reps each direction of straight leg  motion  3) arch raises- tighten bottom of foot and hold x 3-5 sec, repeat 5 times  4) ankle exercises (4 way with theraband)- 3 sets of 10-15 (fatigue) daily  -usually takes several weeks before pain free but longer before return to full activity without pain  --Once released to increase activity, BE PATIENT!    Return to activity guidelines include:  If it hurts, please avoid activity  Start gradually and build up, wait 24 hours before increase intensity and time  Runnin min on treadmill (or somewhere you can get home easily from if you have to stop), start walk 4 min/run 1 min and Repeat 3 times. If pain free for 48 hours, increase to walk 3 min/run 2 min. Continue to increase as such as pain allows. If you develop pain, back off to previous pain-free level and wait 1 week before increasing again.  Follow-up in 6 weeks if not improved or sooner if further concern.  If problem flares again after resolved, restart icing, and exercises.      Standing hamstring stretch: Put the heel of the leg on your injured side on a stool about 15 inches high. Keep your leg straight. Lean forward, bending at the hips, until you feel a mild stretch in the back of your thigh. Make sure you don't roll your shoulders or bend at the waist when doing this or you will stretch  your lower back instead of your leg. Hold the stretch for 15 to 30 seconds. Repeat 3 times.    Quadriceps stretch: Stand at an arm's length away from the wall with your injured side farthest from the wall. Facing straight ahead, brace yourself by keeping one hand against the wall. With your other hand, grasp the ankle on your injured side and pull your heel toward your buttocks. Don't arch or twist your back. Keep your knees together. Hold this stretch for 15 to 30 seconds.    Side-lying leg lift: Lie on your uninjured side. Tighten the front thigh muscles on your injured leg and lift that leg 8 to 10 inches (20 to 25 centimeters) away from the other leg. Keep the leg straight and lower it slowly. Do 2 sets of 15.    Quad sets: Sit on the floor with your injured leg straight and your other leg bent. Press the back of the knee of your injured leg against the floor by tightening the muscles on the top of your thigh. Hold this position 10 seconds. Relax. Do 2 sets of 15.  Straight leg raise: Lie on your back with your legs straight out in front of you. Bend the knee on your uninjured side and place the foot flat on the floor. Tighten the thigh muscle on your injured side and lift your leg about 8 inches off the floor. Keep your leg straight and your thigh muscle tight. Slowly lower your leg back down to the floor. Do 2 sets of 15.    Clam exercise: Lie on your uninjured side with your hips and knees bent and feet together. Slowly raise your top leg toward the ceiling while keeping your heels touching each other. Hold for 2 seconds and lower slowly. Do 2 sets of 15 repetitions.    Wall squat with a ball: Stand with your back, shoulders, and head against a wall. Look straight ahead. Keep your shoulders relaxed and your feet 3 feet (90 centimeters) from the wall and shoulder's width apart. Place a soccer or basketball-sized ball behind your back. Keeping your back against the wall, slowly squat down to a 45-degree angle.  Your thighs will not yet be parallel to the floor. Hold this position for 10 seconds and then slowly slide back up the wall. Repeat 10 times. Build up to 2 sets of 15.    Knee stabilization: Wrap a piece of elastic tubing around the ankle of your uninjured leg. Tie a knot in the other end of the tubing and close it in a door at about ankle height.  Stand facing the door on the leg without tubing (your injured leg) and bend your knee slightly, keeping your thigh muscles tight. Stay in this position while you move the leg with the tubing (the uninjured leg) straight back behind you. Do 2 sets of 15.  Turn 90 degrees so the leg without tubing is closest to the door. Move the leg with tubing away from your body. Do 2 sets of 15.  Turn 90 degrees again so your back is to the door. Move the leg with tubing straight out in front of you. Do 2 sets of 15.  Turn your body 90 degrees again so the leg with tubing is closest to the door. Move the leg with tubing across your body. Do 2 sets of 15.  Hold onto a chair if you need help balancing. This exercise can be made more challenging by standing on a firm pillow or foam mat while you move the leg with tubing.    Resisted terminal knee extension: Make a loop with a piece of elastic tubing by tying a knot in both ends. Close the knot in a door at knee height. Step into the loop with your injured leg so the tubing is around the back of your knee. Lift the other foot off the ground and hold onto a chair for balance, if needed. Bend the knee with tubing about 45 degrees. Slowly straighten your leg, keeping your thigh muscle tight as you do this. Repeat 15 times. Do 2 sets of 15. If you need an easier way to do this, stand on both legs for better support while you do the exercise.    Standing calf stretch: Stand facing a wall with your hands on the wall at about eye level. Keep your injured leg back with your heel on the floor. Keep the other leg forward with the knee bent. Turn your  back foot slightly inward (as if you were pigeon-toed). Slowly lean into the wall until you feel a stretch in the back of your calf. Hold the stretch for 15 to 30 seconds. Return to the starting position. Repeat 3 times. Do this exercise several times each day.    Step-up: Stand with the foot of your injured leg on a support 3 to 5 inches (8 to 13 centimeters) high --like a small step or block of wood. Keep your other foot flat on the floor. Shift your weight onto the injured leg on the support. Straighten your injured leg as the other leg comes off the floor. Return to the starting position by bending your injured leg and slowly lowering your uninjured leg back to the floor. Do 2 sets of 15.    Iliotibial band stretch, side-bending: Cross one leg in front of the other leg and lean in the opposite direction from the front leg. Reach the arm on the side of the back leg over your head while you do this. Hold this position for 15 to 30 seconds. Return to the starting position. Repeat 3 times and then switch legs and repeat the exercise.  Developed by [employer].  Published by [employer].  Copyright  2014 [employer] and/or one of its subsidiaries. All rights reserved.

## 2024-10-04 NOTE — LETTER
10/4/2024      Serjio Shields  [address]  [place] [address]      Dear Colleague,    Thank you for referring your patient, Serjio Shields, to the [facility] [place]. Please see a copy of my visit note below.      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 Referred to PT  -discussed indication for steroid injection. Will defer for now  -otherwise follow up prn      Mac Gonzalez MD  [facility]    -----  Chief Complaint   Patient presents with     Consult For     Bilateral knee pain (L>R)       SUBJECTIVE  Serjio Shields is a/an 63 year old male who is seen as a self referral for evaluation of bilateral knee pain.     The patient is seen by themselves.  The patient is Right handed    Onset: 1 years(s) ago. Reports insidious onset without acute precipitating event.  Location of Pain: bilateral knee - deep in knee  Worsened by: walking down stairs  Better with: nothing  Treatments tried: no treatment tried to date   Associated symptoms: feeling of instability    Orthopedic/Surgical history: NO  Social History/Occupation: Nothing, on the way to custodial       REVIEW OF SYSTEMS:  Do you have fever, chills, weight loss? No  Do you have any vision problems? No  Do you have any chest pain or edema? No  Do you have any shortness of breath or wheezing?  No  Do you have stomach problems? No  Do you have any numbness or focal weakness? No  Do you have diabetes? No  Do you have problems with bleeding or clotting? No  Do  you have an rashes or other skin lesions? No    OBJECTIVE:  There were no vitals taken for this visit.     Patient is alert, No acute distress, pleasant and conversational.    Gait: nonantalgic. Normal heel toe gait.      bilateral knee:   Skin intact. No erythema or ecchymosis.  No effusion or soft tissue swelling.    AROM: Zero to approximately 135  without restriction or reported pain. Crepitus in anterior knee bilateral knee    Palpation: No medial or lateral facet joint tenderness.  No posterior medial or posterior lateral joint line tenderness     Special Tests:  Negative bounce test, negative forced flexion and negative Garo's.  No ligamentous laxity or pain with valgus or varus stress.  Negative Lachman's, Anterior Drawer and Posterior Drawer     Full Isometric quad strength, extensor mechanism in place     Neurovascularly intact in the lower extremity      RADIOLOGY:    4 view xrays of bilateral knees performed and reviewed independently demonstrating no acute fracture or dislocation. No significant djd. See EMR for formal radiology report.                Again, thank you for allowing me to participate in the care of your patient.        Sincerely,        Mac Gonzalez MD

## 2024-10-04 NOTE — PROGRESS NOTES
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 Referred to PT  -discussed indication for steroid injection. Will defer for now  -otherwise follow up prn      Mac Gonzalez MD  [facility] SPORTS MEDIC[facility]    -----  Chief Complaint   Patient presents with    Consult For     Bilateral knee pain (L>R)       SUBJECTIVE  Serjio Shields is a/an 63 year old male who is seen as a self referral for evaluation of bilateral knee pain.     The patient is seen by themselves.  The patient is Right handed    Onset: 1 years(s) ago. Reports insidious onset without acute precipitating event.  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 No  Do you have any vision problems? No  Do you have any chest pain or edema? No  Do you have any shortness of breath or wheezing?  No  Do you have stomach problems? No  Do you have any numbness or focal weakness? No  Do you have diabetes? No  Do you have problems with bleeding or clotting? No  Do you have an rashes or other skin lesions? No    OBJECTIVE:  There were no vitals taken for this visit.     Patient is alert, No acute distress, pleasant and conversational.    Gait: nonantalgic. Normal heel toe gait.      bilateral knee:   Skin intact. No  erythema or ecchymosis.  No effusion or soft tissue swelling.    AROM: Zero to approximately 135  without restriction or reported pain. Crepitus in anterior knee bilateral knee    Palpation: No medial or lateral facet joint tenderness.  No posterior medial or posterior lateral joint line tenderness     Special Tests:  Negative bounce test, negative forced flexion and negative Garo's.  No ligamentous laxity or pain with valgus or varus stress.  Negative Lachman's, Anterior Drawer and Posterior Drawer     Full Isometric quad strength, extensor mechanism in place     Neurovascularly intact in the lower extremity      RADIOLOGY:    4 view xrays of bilateral knees performed and reviewed independently demonstrating no acute fracture or dislocation. No significant djd. See EMR for formal radiology report.

## 2024-10-16 ASSESSMENT — ACTIVITIES OF DAILY LIVING (ADL)
PAIN: THE SYMPTOM AFFECTS MY ACTIVITY SLIGHTLY
RISE FROM A CHAIR: ACTIVITY IS NOT DIFFICULT
KNEEL ON THE FRONT OF YOUR KNEE: ACTIVITY IS NOT DIFFICULT
GIVING WAY, BUCKLING OR SHIFTING OF KNEE: THE SYMPTOM AFFECTS MY ACTIVITY SLIGHTLY
AS_A_RESULT_OF_YOUR_KNEE_INJURY,_HOW_WOULD_YOU_RATE_YOUR_CURRENT_LEVEL_OF_DAILY_ACTIVITY?: NORMAL
GO DOWN STAIRS: ACTIVITY IS SOMEWHAT DIFFICULT
PAIN: THE SYMPTOM AFFECTS MY ACTIVITY SLIGHTLY
KNEE_ACTIVITY_OF_DAILY_LIVING_SCORE: 87.14
SQUAT: ACTIVITY IS NOT DIFFICULT
STAND: ACTIVITY IS NOT DIFFICULT
SWELLING: I DO NOT HAVE THE SYMPTOM
RAW_SCORE: 61
LIMPING: THE SYMPTOM AFFECTS MY ACTIVITY SLIGHTLY
GO DOWN STAIRS: ACTIVITY IS SOMEWHAT DIFFICULT
SIT WITH YOUR KNEE BENT: ACTIVITY IS NOT DIFFICULT
KNEEL ON THE FRONT OF YOUR KNEE: ACTIVITY IS NOT DIFFICULT
SQUAT: ACTIVITY IS NOT DIFFICULT
GO UP STAIRS: ACTIVITY IS NOT DIFFICULT
WALK: ACTIVITY IS MINIMALLY DIFFICULT
WEAKNESS: I DO NOT HAVE THE SYMPTOM
HOW_WOULD_YOU_RATE_THE_OVERALL_FUNCTION_OF_YOUR_KNEE_DURING_YOUR_USUAL_DAILY_ACTIVITIES?: ABNORMAL
STIFFNESS: I DO NOT HAVE THE SYMPTOM
SWELLING: I DO NOT HAVE THE SYMPTOM
PLEASE_INDICATE_YOR_PRIMARY_REASON_FOR_REFERRAL_TO_THERAPY:: KNEE
LIMPING: THE SYMPTOM AFFECTS MY ACTIVITY SLIGHTLY
WALK: ACTIVITY IS MINIMALLY DIFFICULT
GO UP STAIRS: ACTIVITY IS NOT DIFFICULT
SIT WITH YOUR KNEE BENT: ACTIVITY IS NOT DIFFICULT
HOW_WOULD_YOU_RATE_THE_OVERALL_FUNCTION_OF_YOUR_KNEE_DURING_YOUR_USUAL_DAILY_ACTIVITIES?: ABNORMAL
GIVING WAY, BUCKLING OR SHIFTING OF KNEE: THE SYMPTOM AFFECTS MY ACTIVITY SLIGHTLY
WEAKNESS: I DO NOT HAVE THE SYMPTOM
STIFFNESS: I DO NOT HAVE THE SYMPTOM
RISE FROM A CHAIR: ACTIVITY IS NOT DIFFICULT
KNEE_ACTIVITY_OF_DAILY_LIVING_SUM: 61
AS_A_RESULT_OF_YOUR_KNEE_INJURY,_HOW_WOULD_YOU_RATE_YOUR_CURRENT_LEVEL_OF_DAILY_ACTIVITY?: NORMAL
STAND: ACTIVITY IS NOT DIFFICULT

## 2024-10-21 ENCOUNTER — THERAPY VISIT (OUTPATIENT)
Dept: PHYSICAL THERAPY | Facility: CLINIC | Age: 64
End: 2024-10-21
Attending: FAMILY MEDICINE
Payer: COMMERCIAL

## 2024-10-21 DIAGNOSIS — G89.29 BILATERAL CHRONIC KNEE PAIN: ICD-10-CM

## 2024-10-21 DIAGNOSIS — M25.562 BILATERAL CHRONIC KNEE PAIN: ICD-10-CM

## 2024-10-21 DIAGNOSIS — M25.561 BILATERAL CHRONIC KNEE PAIN: ICD-10-CM

## 2024-10-21 PROCEDURE — 97161 PT EVAL LOW COMPLEX 20 MIN: CPT | Mod: GP | Performed by: PHYSICAL THERAPIST

## 2024-10-21 PROCEDURE — 97110 THERAPEUTIC EXERCISES: CPT | Mod: GP | Performed by: PHYSICAL THERAPIST

## 2024-10-21 NOTE — PROGRESS NOTES
PHYSICAL THERAPY EVALUATION  Type of Visit: Evaluation       Fall Risk Screen:  Fall screen completed by: PT  Have you fallen 2 or more times in the past year?: No  Have you fallen and had an injury in the past year?: No  Is patient a fall risk?: No    Subjective       Presenting condition or subjective complaint: Knee pain especially in the anterior knee towards the patella area that started about 1 year ago. 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 Hx of trauma to meniscus when he was 14 years old with no surgical interventions. Left knee is more intense of the two with the right knee 4/10 at worst and left knee 7/10 at worst.    Date of onset: 10/04/24    Relevant medical history:   none relevant  Dates & types of surgery:  none relevant    Prior diagnostic imaging/testing results: X-ray     Prior therapy history for the same diagnosis, illness or injury: No      Living Environment  Social support: With family members   Type of home: House   Stairs to enter the home: No       Ramp: No   Stairs inside the home: Yes 10 Is there a railing: Yes     Help at home: None  Equipment owned:       Employment: No    Hobbies/Interests: Flying    Patient goals for therapy:      Pain assessment: See objective evaluation for additional pain details     Objective     KNEE EVALUATION  PAIN: Pain Level at Rest: 0/10  Pain Level with Use: 7/10  Pain Location: knee, anterior, inferior to patella  Pain Quality: Aching, Dull, Sharp, and Shooting  Pain Frequency: variable, some days it's pretty constant  Pain is Exacerbated By: stairs, walking  Pain is Relieved By: rest  Pain Progression: Unchanged    INTEGUMENTARY (edema, incisions):   POSTURE:   GAIT:  Weightbearing Status:   Assistive Device(s):   Gait Deviations:   BALANCE/PROPRIOCEPTION:   WEIGHTBEARING ALIGNMENT:   NON-WEIGHTBEARING ALIGNMENT:   ROM:   (Degrees) Left AROM Left PROM  Right AROM Right PROM   Knee Flexion 135  135    Knee Extension WNL  WNL    Pain:   End feel:  "    STRENGTH:   Pain: - none + mild ++ moderate +++ severe  Strength Scale: 0-5/5 Left Right   Knee Flexion 5- 5   Knee Extension 4+ 5-   Hip Abduction 4+ 4     FLEXIBILITY:   SPECIAL TESTS: -Garo's  FUNCTIONAL TESTS: Step Test: knee valgus and slight anterior knee pain 6\" step  Single Leg Squat: Anterior knee translation, Knee valgus, Hip internal rotation, Improper use of glutes/hips, and minimal pain. Limitations though very good for 64 years old.  PALPATION:  no pain with palpation  JOINT MOBILITY: WNL/great    Assessment & Plan   CLINICAL IMPRESSIONS  Medical Diagnosis: Bilateral knee pain    Treatment Diagnosis: Bilateral knee pain   Impression/Assessment: Patient is a 64 year old male with bilateral knee complaints.  The following significant findings have been identified: Pain, Decreased ROM/flexibility, Decreased joint mobility, and Decreased strength. These impairments interfere with their ability to perform recreational activities and household chores as compared to previous level of function.     Clinical Decision Making (Complexity):  Clinical Presentation: Stable/Uncomplicated  Clinical Presentation Rationale: based on medical and personal factors listed in PT evaluation  Clinical Decision Making (Complexity): Low complexity    PLAN OF CARE  Treatment Interventions:  Interventions: Manual Therapy, Neuromuscular Re-education, Therapeutic Activity, Therapeutic Exercise    Long Term Goals     PT Goal 1  Goal Identifier: LTG 1  Goal Description: Patient will be able to ascend and descend stairs without knee pain over 1 week span  Rationale: to maximize safety and independence within the home;to maximize safety and independence within the community  Target Date: 12/02/24      Frequency of Treatment: every other week  Duration of Treatment: 6 visits    Recommended Referrals to Other Professionals:   Education Assessment:   Learner/Method: No Barriers to " Learning;Pictures/Video;Demonstration;Reading;Listening;Patient    Risks and benefits of evaluation/treatment have been explained.   Patient/Family/caregiver agrees with Plan of Care.     Evaluation Time:     PT Eval, Low Complexity Minutes (09425): 20       Signing Clinician: JOHNNY Horne [facility]                                                                                   OUTPATIENT PHYSICAL THERAPY      PLAN OF TREATMENT FOR OUTPATIENT REHABILITATION   Patient's Last Name, First Name, Serjio Matute YOB: 1960   Provider's Name   [facility]   Medical Record No.  [id number]     Onset Date: 10/04/24  Start of Care Date: 10/21/24     Medical Diagnosis:  Bilateral knee pain      PT Treatment Diagnosis:  Bilateral knee pain Plan of Treatment  Frequency/Duration: every other week/ 6 visits    Certification date from 10/21/24 to 01/13/25         See note for plan of treatment details and functional goals     David Prasad, PT                         I CERTIFY THE NEED FOR THESE SERVICES FURNISHED UNDER        THIS PLAN OF TREATMENT AND WHILE UNDER MY CARE     (Physician attestation of this document indicates review and certification of the therapy plan).              Referring Provider:  Mac Gonzalez    Initial Assessment  See Epic Evaluation- Start of Care Date: 10/21/24

## 2024-11-18 ENCOUNTER — THERAPY VISIT (OUTPATIENT)
Dept: PHYSICAL THERAPY | Facility: CLINIC | Age: 64
End: 2024-11-18
Payer: COMMERCIAL

## 2024-11-18 DIAGNOSIS — G89.29 BILATERAL CHRONIC KNEE PAIN: Primary | ICD-10-CM

## 2024-11-18 DIAGNOSIS — M25.562 BILATERAL CHRONIC KNEE PAIN: Primary | ICD-10-CM

## 2024-11-18 DIAGNOSIS — M25.561 BILATERAL CHRONIC KNEE PAIN: Primary | ICD-10-CM

## 2024-11-18 PROCEDURE — 97112 NEUROMUSCULAR REEDUCATION: CPT | Mod: GP | Performed by: PHYSICAL THERAPIST

## 2024-11-18 PROCEDURE — 97110 THERAPEUTIC EXERCISES: CPT | Mod: GP | Performed by: PHYSICAL THERAPIST

## 2025-01-08 ENCOUNTER — PATIENT OUTREACH (OUTPATIENT)
Dept: CARE COORDINATION | Facility: CLINIC | Age: 65
End: 2025-01-08
Payer: COMMERCIAL

## 2025-02-07 SDOH — HEALTH STABILITY: PHYSICAL HEALTH: ON AVERAGE, HOW MANY MINUTES DO YOU ENGAGE IN EXERCISE AT THIS LEVEL?: 30 MIN

## 2025-02-07 SDOH — HEALTH STABILITY: PHYSICAL HEALTH: ON AVERAGE, HOW MANY DAYS PER WEEK DO YOU ENGAGE IN MODERATE TO STRENUOUS EXERCISE (LIKE A BRISK WALK)?: 4 DAYS

## 2025-02-07 ASSESSMENT — SOCIAL DETERMINANTS OF HEALTH (SDOH): HOW OFTEN DO YOU GET TOGETHER WITH FRIENDS OR RELATIVES?: ONCE A WEEK

## 2025-02-12 ENCOUNTER — OFFICE VISIT (OUTPATIENT)
Dept: FAMILY MEDICINE | Facility: CLINIC | Age: 65
End: 2025-02-12
Payer: COMMERCIAL

## 2025-02-12 VITALS
TEMPERATURE: 98.4 F | OXYGEN SATURATION: 99 % | SYSTOLIC BLOOD PRESSURE: 104 MMHG | HEART RATE: 59 BPM | WEIGHT: 168.2 LBS | RESPIRATION RATE: 13 BRPM | DIASTOLIC BLOOD PRESSURE: 64 MMHG | HEIGHT: 68 IN | BODY MASS INDEX: 25.49 KG/M2

## 2025-02-12 DIAGNOSIS — N40.1 BENIGN PROSTATIC HYPERPLASIA (BPH) WITH STRAINING ON URINATION: ICD-10-CM

## 2025-02-12 DIAGNOSIS — E78.5 HYPERLIPIDEMIA LDL GOAL <130: ICD-10-CM

## 2025-02-12 DIAGNOSIS — R82.994 HYPERCALCIURIA: ICD-10-CM

## 2025-02-12 DIAGNOSIS — Z00.00 ENCOUNTER FOR MEDICARE ANNUAL WELLNESS EXAM: Primary | ICD-10-CM

## 2025-02-12 DIAGNOSIS — Z12.5 SCREENING FOR PROSTATE CANCER: ICD-10-CM

## 2025-02-12 DIAGNOSIS — Z87.442 HISTORY OF KIDNEY STONES: ICD-10-CM

## 2025-02-12 DIAGNOSIS — R39.16 BENIGN PROSTATIC HYPERPLASIA (BPH) WITH STRAINING ON URINATION: ICD-10-CM

## 2025-02-12 LAB
ALBUMIN SERPL BCG-MCNC: 4.1 G/DL (ref 3.5–5.2)
ALP SERPL-CCNC: 69 U/L (ref 40–150)
ALT SERPL W P-5'-P-CCNC: 14 U/L (ref 0–70)
ANION GAP SERPL CALCULATED.3IONS-SCNC: 10 MMOL/L (ref 7–15)
AST SERPL W P-5'-P-CCNC: 21 U/L (ref 0–45)
BILIRUB SERPL-MCNC: 0.6 MG/DL
BUN SERPL-MCNC: 15.4 MG/DL (ref 8–23)
CALCIUM SERPL-MCNC: 9.3 MG/DL (ref 8.8–10.4)
CHLORIDE SERPL-SCNC: 104 MMOL/L (ref 98–107)
CHOLEST SERPL-MCNC: 171 MG/DL
CREAT SERPL-MCNC: 0.82 MG/DL (ref 0.67–1.17)
EGFRCR SERPLBLD CKD-EPI 2021: >90 ML/MIN/1.73M2
FASTING STATUS PATIENT QL REPORTED: YES
FASTING STATUS PATIENT QL REPORTED: YES
GLUCOSE SERPL-MCNC: 99 MG/DL (ref 70–99)
HCO3 SERPL-SCNC: 27 MMOL/L (ref 22–29)
HDLC SERPL-MCNC: 52 MG/DL
HGB BLD-MCNC: 14.7 G/DL (ref 13.3–17.7)
LDLC SERPL CALC-MCNC: 100 MG/DL
NONHDLC SERPL-MCNC: 119 MG/DL
POTASSIUM SERPL-SCNC: 4.7 MMOL/L (ref 3.4–5.3)
PROT SERPL-MCNC: 6.9 G/DL (ref 6.4–8.3)
PSA SERPL DL<=0.01 NG/ML-MCNC: 1.96 NG/ML (ref 0–4.5)
SODIUM SERPL-SCNC: 141 MMOL/L (ref 135–145)
TRIGL SERPL-MCNC: 97 MG/DL

## 2025-02-12 PROCEDURE — 36415 COLL VENOUS BLD VENIPUNCTURE: CPT | Performed by: PHYSICIAN ASSISTANT

## 2025-02-12 PROCEDURE — 85018 HEMOGLOBIN: CPT | Performed by: PHYSICIAN ASSISTANT

## 2025-02-12 PROCEDURE — 80061 LIPID PANEL: CPT | Performed by: PHYSICIAN ASSISTANT

## 2025-02-12 PROCEDURE — G0438 PPPS, INITIAL VISIT: HCPCS | Performed by: PHYSICIAN ASSISTANT

## 2025-02-12 PROCEDURE — 99214 OFFICE O/P EST MOD 30 MIN: CPT | Mod: 25 | Performed by: PHYSICIAN ASSISTANT

## 2025-02-12 PROCEDURE — 80053 COMPREHEN METABOLIC PANEL: CPT | Performed by: PHYSICIAN ASSISTANT

## 2025-02-12 PROCEDURE — G2211 COMPLEX E/M VISIT ADD ON: HCPCS | Performed by: PHYSICIAN ASSISTANT

## 2025-02-12 PROCEDURE — G0103 PSA SCREENING: HCPCS | Performed by: PHYSICIAN ASSISTANT

## 2025-02-12 RX ORDER — HYDROCHLOROTHIAZIDE 50 MG/1
50 TABLET ORAL DAILY
Qty: 90 TABLET | Refills: 3 | Status: SHIPPED | OUTPATIENT
Start: 2025-02-12

## 2025-02-12 RX ORDER — TAMSULOSIN HYDROCHLORIDE 0.4 MG/1
0.4 CAPSULE ORAL DAILY
Qty: 90 CAPSULE | Refills: 3 | Status: SHIPPED | OUTPATIENT
Start: 2025-02-12

## 2025-02-12 RX ORDER — SIMVASTATIN 40 MG
40 TABLET ORAL AT BEDTIME
Qty: 90 TABLET | Refills: 3 | Status: SHIPPED | OUTPATIENT
Start: 2025-02-12

## 2025-02-12 ASSESSMENT — PAIN SCALES - GENERAL: PAINLEVEL_OUTOF10: NO PAIN (0)

## 2025-02-12 NOTE — PROGRESS NOTES
Preventive Care Visit  [facility] BERENICE Whalen PA-C, Family Medicine  Feb 12, 2025      Assessment & Plan     Encounter for Medicare annual wellness exam  Reviewed VS, weight/BMI   Routine screenings see orders  Immunizations: see orders and documentation in HPI. Discussed vaccines coverage as pharmacy benefit.  Health and cancer screening recommendations delivered according to the USPSTF and other appropriate society guidelines  Nutrition and exercise counseling performed.  He declines prevnar 20  - REVIEW OF HEALTH MAINTENANCE PROTOCOL ORDERS  - Comprehensive metabolic panel (BMP + Alb, Alk Phos, ALT, AST, Total. Bili, TP); Future  - Hemoglobin; Future  - Comprehensive metabolic panel (BMP + Alb, Alk Phos, ALT, AST, Total. Bili, TP)  - Hemoglobin    Hyperlipidemia LDL goal <130  Well controlled on statin. Tolerating statin well. No side effects. Update labs , refilled x 1 yr.   Counseled on heart healthy eating and regular exercise  - Lipid panel reflex to direct LDL Fasting; Future  - simvastatin (ZOCOR) 40 MG tablet; Take 1 tablet (40 mg) by mouth at bedtime.  - Comprehensive metabolic panel (BMP + Alb, Alk Phos, ALT, AST, Total. Bili, TP); Future  - Lipid panel reflex to direct LDL Fasting  - Comprehensive metabolic panel (BMP + Alb, Alk Phos, ALT, AST, Total. Bili, TP)    Benign prostatic hyperplasia (BPH) with straining on urination  Effective, tolerates well. Emptying well, no nocturia. Refilled   - tamsulosin (FLOMAX) 0.4 MG capsule; Take 1 capsule (0.4 mg) by mouth daily.    History of kidney stones  Hypercalciuria  Has been on hydrochlorothiazide for prevention for years. Tolerates well. No kidney stone sx. Labs today   - hydrochlorothiazide (HYDRODIURIL) 50 MG tablet; Take 1 tablet (50 mg) by mouth daily.    Screening for prostate cancer  Discussed PSA screening guidelines. Pt agreeable to screen.   - PROSTATE SPEC ANTIGEN SCREEN; Future  - PROSTATE SPEC ANTIGEN  "SCREEN    Patient has been advised of split billing requirements and indicates understanding: Yes        BMI  Estimated body mass index is 25.35 kg/m  as calculated from the following:    Height as of this encounter: 1.735 m (5' 8.31\").    Weight as of this encounter: 76.3 kg (168 lb 3.2 oz).       Counseling  Appropriate preventive services were addressed with this patient via screening, questionnaire, or discussion as appropriate for fall prevention, nutrition, physical activity, Tobacco-use cessation, social engagement, weight loss and cognition.  Checklist reviewing preventive services available has been given to the patient.  Reviewed patient's diet, addressing concerns and/or questions.       Follow Up: see above. Additionally patient was instructed to contact clinic for worsening symptoms, non-improvement in time frame discussed, and for questions regarding treatment plan.   For virtual visits, the patient was advised to be seen for in person evaluation if symptoms or condition are worsening or non-improvement as expected.   SENG Horn   Chema is a 64 year old, presenting for the following:  Annual Visit        2/12/2025     6:51 AM   Additional Questions   Roomed by AD   Accompanied by Self           HPI  Routine Health Maintenance:  Immunizations: prevnar declines.   Colonoscopy: 09/2024. 5 yr follow up. Colon cancer in mother.   PSA screening: normal screen prior.   Fasting: yes  Diabetes screening: normal prior      Weight is down 10 lb from last year. I stopped flying (was ) so I am not on the road anymore. Not eating food late at night and all the take out. I get to this weight also when exercising regularly in summer months.     Hyperlipidemia- on simvastatin.   Vegetarian diet.   Exercise- biking in nice weather. Lifting weights 2-3 d per week.   Working parttime at [employer] on feet     Hydrochlorothiazide for kidney stone prevention. Not checking home BP. "      BPH- tamsulosin - emptying well. No nocturia- almost never need to get up to void.       Health Care Directive  Patient has a Health Care Directive on file  Advance care planning document is on file and is current.      2/7/2025   General Health   How would you rate your overall physical health? Excellent   Feel stress (tense, anxious, or unable to sleep) Not at all         2/7/2025   Nutrition   Diet: Low salt    Vegetarian/vegan       Multiple values from one day are sorted in reverse-chronological order         2/7/2025   Exercise   Days per week of moderate/strenous exercise 4 days   Average minutes spent exercising at this level 30 min         2/7/2025   Social Factors   Frequency of gathering with friends or relatives Once a week   Worry food won't last until get money to buy more No   Food not last or not have enough money for food? No   Do you have housing? (Housing is defined as stable permanent housing and does not include staying ouside in a car, in a tent, in an abandoned building, in an overnight shelter, or couch-surfing.) Yes   Are you worried about losing your housing? No   Lack of transportation? No   Unable to get utilities (heat,electricity)? No         2/7/2025   Fall Risk   Fallen 2 or more times in the past year? No   Trouble with walking or balance? No          2/7/2025   Activities of Daily Living- Home Safety   Needs help with the following daily activites None of the above   Safety concerns in the home None of the above         2/7/2025   Dental   Dentist two times every year? Yes         2/7/2025   Hearing Screening   Hearing concerns? None of the above         2/7/2025   Driving Risk Screening   Patient/family members have concerns about driving No         2/7/2025   General Alertness/Fatigue Screening   Have you been more tired than usual lately? No         2/7/2025   Urinary Incontinence Screening   Bothered by leaking urine in past 6 months No         1/31/2024   TB Screening    Were you born outside of the [place]? No         Today's PHQ-2 Score:       2/12/2025     6:47 AM   PHQ-2 ( 1999 Pfizer)   Q1: Little interest or pleasure in doing things 0   Q2: Feeling down, depressed or hopeless 0   PHQ-2 Score 0    Q1: Little interest or pleasure in doing things Not at all   Q2: Feeling down, depressed or hopeless Not at all   PHQ-2 Score 0       Patient-reported           2/7/2025   Substance Use   Alcohol more than 3/day or more than 7/wk No   Do you have a current opioid prescription? No   How severe/bad is pain from 1 to 10? 0/10 (No Pain)   Do you use any other substances recreationally? No     Social History     Tobacco Use    Smoking status: Never     Passive exposure: Never    Smokeless tobacco: Never   Vaping Use    Vaping status: Never Used   Substance Use Topics    Alcohol use: Yes     Comment: rare use    Drug use: Never           2/7/2025   AAA Screening   Family history of Abdominal Aortic Aneurysm (AAA)? No   Last PSA:   PSA   Date Value Ref Range Status   11/23/2020 1.96 0 - 4 ug/L Final     Comment:     Assay Method:  Chemiluminescence using Siemens Vista analyzer     Prostate Specific Antigen Screen   Date Value Ref Range Status   02/07/2024 1.46 0.00 - 4.50 ng/mL Final   01/16/2023 1.79 0.00 - 4.00 ug/L Final     ASCVD Risk   The 10-year ASCVD risk score (Vincent BORGES, et al., 2019) is: 9.3%    Values used to calculate the score:      Age: 64 years      Sex: Male      Is Non- : No      Diabetic: No      Tobacco smoker: No      Systolic Blood Pressure: 104 mmHg      Is BP treated: No      HDL Cholesterol: 50 mg/dL      Total Cholesterol: 187 mg/dL            Reviewed and updated as needed this visit by Provider                    Past Medical History:   Diagnosis Date    Family history of colon cancer     Family history of colon cancer     Pulmonary emboli (H)     Pure hypercholesterolemia     Renal disease     kidney stones     Past Surgical History:    Procedure Laterality Date    COLONOSCOPY  1/16    CYSTOSCOPY, URETEROSCOPY, COMBINED Left 6/24/2015    Procedure: COMBINED CYSTOSCOPY, URETEROSCOPY;  Surgeon: Larry Elias MD;  Location: [facility] OR    EYE SURGERY  6/90    RK on both eyes    SURGICAL HISTORY OF -       meatus dilation in childhood     Lab work is in process  Labs reviewed in EPIC  BP Readings from Last 3 Encounters:   02/12/25 104/64   02/07/24 112/76   11/06/23 120/74    Wt Readings from Last 3 Encounters:   02/12/25 76.3 kg (168 lb 3.2 oz)   02/07/24 80 kg (176 lb 7 oz)   11/06/23 78.8 kg (173 lb 12.8 oz)                  Patient Active Problem List   Diagnosis    Kidney stone    Colon polyp    Family history of colon cancer    Family history of prostate cancer    Hyperlipidemia LDL goal <130    History of pulmonary embolism    Seborrheic keratosis    Freckled skin    Elevated blood pressure reading    Posterior vitreous detachment of left eye    History of radial keratotomy    Bilateral chronic knee pain     Past Surgical History:   Procedure Laterality Date    COLONOSCOPY  1/16    CYSTOSCOPY, URETEROSCOPY, COMBINED Left 6/24/2015    Procedure: COMBINED CYSTOSCOPY, URETEROSCOPY;  Surgeon: Larry Elias MD;  Location: [facility] OR    EYE SURGERY  6/90    RK on both eyes    SURGICAL HISTORY OF -       meatus dilation in childhood       Social History     Tobacco Use    Smoking status: Never     Passive exposure: Never    Smokeless tobacco: Never   Substance Use Topics    Alcohol use: Yes     Comment: rare use- 1 beer per week or less     Family History   Problem Relation Age of Onset    C.A.D. Mother         stents    Hypertension Mother     Cancer - colorectal Mother     Cancer Mother         skin cancer    Colon Cancer Mother     Hyperlipidemia Mother     Cancer Father         bladder, metastatic to bone, passed 2002    Skin Cancer Father     Other Cancer Father         Bladder    Hypertension Brother     Prostate Cancer Maternal  Grandfather     Prostate Cancer Paternal Grandfather     Diabetes No family hx of     Cerebrovascular Disease No family hx of     Breast Cancer No family hx of          Current Outpatient Medications   Medication Sig Dispense Refill    hydrochlorothiazide (HYDRODIURIL) 50 MG tablet Take 1 tablet (50 mg) by mouth daily 90 tablet 3    simvastatin (ZOCOR) 40 MG tablet Take 1 tablet (40 mg) by mouth at bedtime 90 tablet 3    tamsulosin (FLOMAX) 0.4 MG capsule TAKE ONE CAPSULE BY MOUTH EVERY DAY 30 capsule 0     Allergies   Allergen Reactions    Penicillins Unknown     Unsure of exact reaction, had it as an infant     Current providers sharing in care for this patient include:  Patient Care Team:  Debbie Whalen PA-C as PCP - General  [facility] as MD (Nephrology)  Sony Kidd MD as Referring Physician (Family Practice)  Johnathon Griffin MD as MD (Family Medicine - Sports Medicine)  Kailey Martinez MD as MD (Dermatology)  Faisal Nicolas MD as MD (Dermatology)  Debbie Whalen PA-C as Assigned PCP  Jabari Grant MD as Assigned Surgical Provider  Mac Gonzalez MD as Assigned Musculoskeletal Provider    The following health maintenance items are reviewed in Epic and correct as of today:  Health Maintenance   Topic Date Due    Pneumococcal Vaccine: 50+ Years (1 of 1 - PCV) Never done    BMP  02/07/2025    LIPID  02/07/2025    PSA  02/07/2025    ANNUAL REVIEW OF HM ORDERS  02/07/2025    MEDICARE ANNUAL WELLNESS VISIT  02/07/2025    COVID-19 Vaccine (8 - 2024-25 season) 03/26/2025    FALL RISK ASSESSMENT  02/12/2026    GLUCOSE  02/07/2027    ADVANCE CARE PLANNING  02/07/2029    COLORECTAL CANCER SCREENING  09/04/2029    DTAP/TDAP/TD IMMUNIZATION (3 - Td or Tdap) 11/23/2030    RSV VACCINE (1 - 1-dose 75+ series) 10/15/2033    HEPATITIS C SCREENING  Completed    PHQ-2 (once per calendar year)  Completed    INFLUENZA VACCINE  Completed    ZOSTER  "IMMUNIZATION  Completed    HPV IMMUNIZATION  Aged Out    MENINGITIS IMMUNIZATION  Aged Out         Review of Systems  Constitutional, HEENT, cardiovascular, pulmonary, gi and gu systems are negative, except as otherwise noted.     Objective    Exam  /64 (BP Location: Left arm, Patient Position: Sitting, Cuff Size: Adult Regular)   Pulse 59   Temp 98.4  F (36.9  C) (Temporal)   Resp 13   Ht 1.735 m (5' 8.31\")   Wt 76.3 kg (168 lb 3.2 oz)   SpO2 99%   BMI 25.35 kg/m     Estimated body mass index is 25.35 kg/m  as calculated from the following:    Height as of this encounter: 1.735 m (5' 8.31\").    Weight as of this encounter: 76.3 kg (168 lb 3.2 oz).    Physical Exam  GENERAL: alert and no distress  EYES: Eyes grossly normal to inspection, PERRL and conjunctivae and sclerae normal  HENT: ear canals and TM's normal, nose and mouth without ulcers or lesions  NECK: no adenopathy, no asymmetry, masses, or scars  RESP: lungs clear to auscultation - no rales, rhonchi or wheezes  CV: regular rate and rhythm, normal S1 S2, no S3 or S4, no murmur, click or rub, no peripheral edema  ABDOMEN: soft, nontender, no hepatosplenomegaly, no masses and bowel sounds normal  MS: no gross musculoskeletal defects noted, no edema  NEURO: Normal strength and tone, mentation intact and speech normal  PSYCH: mentation appears normal, affect normal/bright        2/12/2025   Mini Cog   Clock Draw Score 2 Normal   3 Item Recall 3 objects recalled   Mini Cog Total Score 5             2/7/2024   Vision Screen   Patient wears corrective lenses (select all that apply) Worn during vision screen   Vision Screen Results Pass       Signed Electronically by: Debbie Whalen PA-C    "

## 2025-02-12 NOTE — PATIENT INSTRUCTIONS
Patient Education   Instructions from Today's Visit:  You can come in on the nursing schedule for the prevnar 20 if you change your mind.       General Instructions After Your Visit  If you have been seen for a concern and are worsening or not improving as expected, please schedule a follow-up visit or reach out to a member of our care team or nurses if it is urgent.  We have Nurse advice available 24/7 by calling [phone].  For emergencies, please call [phone].    My Clinic Hours  I am in the office Mondays, Wednesdays, and Thursdays. Messages received outside of normal clinic hours and on my days out of the office will be reviewed by our [place] care team, but may not be addressed by me personally until I am back in the office. If you have concerns that cannot wait for my return please call the Nurse advise line [phone].    Test results  You may see your lab or test results before we can make recommendations. This is common, as sometimes we are awaiting on other labs to return or we are out of office on a particular day. Please be patient, and if you don't see a response from me or one of my colleagues within 2-3 business days, and you have a specific concern, please send us a message.    Refills  If you have run out of refills, please schedule a visit. This is generally an indication that you are due for a follow-up visit. All prescriptions are only valid for 1 year from the date written and need a visit annually to renew. Most mental health and chronic diseases we are treating (diabetes, high blood pressure, etc) require some type of visit every 6 months. We are now offering video visits! However, if physical exams are needed or it is a complex concern, we may ask you to be seen in person.    Physicals & Preventative Visits   These appointment slots fill up fast. Please consider scheduling these 2-3 months in advance to allow for the appointment time that fits you best. If you have medications ordered or  other issues addressed that are not preventive at these visits, please be aware there are extra costs associated with this.       Preventive Care Advice   This is general advice given by our system to help you stay healthy. However, your care team may have specific advice just for you. Please talk to your care team about your preventive care needs.  Nutrition  Eat 5 or more servings of fruits and vegetables each day.  Try wheat bread, brown rice and whole grain pasta (instead of white bread, rice, and pasta).  Get enough calcium and vitamin D. Check the label on foods and aim for 100% of the RDA (recommended daily allowance).  Lifestyle  Exercise at least 150 minutes each week  (30 minutes a day, 5 days a week).  Do muscle strengthening activities 2 days a week. These help control your weight and prevent disease.  No smoking.  Wear sunscreen to prevent skin cancer.  Have a dental exam and cleaning every 6 months.  Yearly exams  See your health care team every year to talk about:  Any changes in your health.  Any medicines your care team has prescribed.  Preventive care, family planning, and ways to prevent chronic diseases.  Shots (vaccines)   HPV shots (up to age 26), if you've never had them before.  Hepatitis B shots (up to age 59), if you've never had them before.  COVID-19 shot: Get this shot when it's due.  Flu shot: Get a flu shot every year.  Tetanus shot: Get a tetanus shot every 10 years.  Pneumococcal, hepatitis A, and RSV shots: Ask your care team if you need these based on your risk.  Shingles shot (for age 50 and up)  General health tests  Diabetes screening:  Starting at age 35, Get screened for diabetes at least every 3 years.  If you are younger than age 35, ask your care team if you should be screened for diabetes.  Cholesterol test: At age 39, start having a cholesterol test every 5 years, or more often if advised.  Bone density scan (DEXA): At age 50, ask your care team if you should have this  scan for osteoporosis (brittle bones).  Hepatitis C: Get tested at least once in your life.  STIs (sexually transmitted infections)  Before age 24: Ask your care team if you should be screened for STIs.  After age 24: Get screened for STIs if you're at risk. You are at risk for STIs (including HIV) if:  You are sexually active with more than one person.  You don't use condoms every time.  You or a partner was diagnosed with a sexually transmitted infection.  If you are at risk for HIV, ask about PrEP medicine to prevent HIV.  Get tested for HIV at least once in your life, whether you are at risk for HIV or not.  Cancer screening tests  Cervical cancer screening: If you have a cervix, begin getting regular cervical cancer screening tests starting at age 21.  Breast cancer scan (mammogram): If you've ever had breasts, begin having regular mammograms starting at age 40. This is a scan to check for breast cancer.  Colon cancer screening: It is important to start screening for colon cancer at age 45.  Have a colonoscopy test every 10 years (or more often if you're at risk) Or, ask your provider about stool tests like a FIT test every year or Cologuard test every 3 years.  To learn more about your testing options, visit:   .  For help making a decision, visit:   https://bit.ly/ka70807.  Prostate cancer screening test: If you have a prostate, ask your care team if a prostate cancer screening test (PSA) at age 55 is right for you.  Lung cancer screening: If you are a current or former smoker ages 50 to 80, ask your care team if ongoing lung cancer screenings are right for you.  For informational purposes only. Not to replace the advice of your health care provider. Copyright   2023 [employer]. All rights reserved. Clinically reviewed by the [facility]. Q.ME 823294 - REV 01/24.